# Patient Record
Sex: MALE | Race: WHITE | URBAN - METROPOLITAN AREA
[De-identification: names, ages, dates, MRNs, and addresses within clinical notes are randomized per-mention and may not be internally consistent; named-entity substitution may affect disease eponyms.]

---

## 2024-07-18 ENCOUNTER — PATIENT MESSAGE (OUTPATIENT)
Dept: SURGERY | Facility: CLINIC | Age: 69
End: 2024-07-18

## 2024-07-18 ENCOUNTER — OFFICE VISIT (OUTPATIENT)
Dept: SURGERY | Facility: CLINIC | Age: 69
End: 2024-07-18
Payer: COMMERCIAL

## 2024-07-18 DIAGNOSIS — R33.9 INCOMPLETE EMPTYING OF BLADDER: ICD-10-CM

## 2024-07-18 DIAGNOSIS — R31.29 MICROHEMATURIA: ICD-10-CM

## 2024-07-18 DIAGNOSIS — N41.9 PROSTATITIS, UNSPECIFIED PROSTATITIS TYPE: Primary | ICD-10-CM

## 2024-07-18 DIAGNOSIS — R82.90 URINE FINDING: ICD-10-CM

## 2024-07-18 LAB
APPEARANCE: CLEAR
BILIRUBIN: NEGATIVE
GLUCOSE (URINE DIPSTICK): NEGATIVE MG/DL
KETONES (URINE DIPSTICK): NEGATIVE MG/DL
MULTISTIX LOT#: ABNORMAL NUMERIC
NITRITE, URINE: NEGATIVE
PH, URINE: 6 (ref 4.5–8)
PROTEIN (URINE DIPSTICK): NEGATIVE MG/DL
SPECIFIC GRAVITY: 1.02 (ref 1–1.03)
URINE-COLOR: YELLOW
UROBILINOGEN,SEMI-QN: 0.2 MG/DL (ref 0–1.9)

## 2024-07-18 PROCEDURE — 81003 URINALYSIS AUTO W/O SCOPE: CPT | Performed by: PHYSICIAN ASSISTANT

## 2024-07-18 PROCEDURE — 51798 US URINE CAPACITY MEASURE: CPT | Performed by: PHYSICIAN ASSISTANT

## 2024-07-18 PROCEDURE — 99204 OFFICE O/P NEW MOD 45 MIN: CPT | Performed by: PHYSICIAN ASSISTANT

## 2024-07-18 RX ORDER — TAMSULOSIN HYDROCHLORIDE 0.4 MG/1
0.4 CAPSULE ORAL DAILY
Qty: 90 CAPSULE | Refills: 3 | Status: SHIPPED | OUTPATIENT
Start: 2024-07-18

## 2024-07-18 RX ORDER — LEVOFLOXACIN 500 MG/1
500 TABLET, FILM COATED ORAL DAILY
Qty: 28 TABLET | Refills: 0 | Status: SHIPPED | OUTPATIENT
Start: 2024-07-18 | End: 2024-08-15

## 2024-07-18 RX ORDER — SERTRALINE HYDROCHLORIDE 100 MG/1
100 TABLET, FILM COATED ORAL 2 TIMES DAILY
COMMUNITY
Start: 2024-03-05

## 2024-07-18 RX ORDER — TIRZEPATIDE 2.5 MG/.5ML
2.5 INJECTION, SOLUTION SUBCUTANEOUS WEEKLY
COMMUNITY
Start: 2024-07-17

## 2024-07-18 RX ORDER — ATORVASTATIN CALCIUM 40 MG/1
40 TABLET, FILM COATED ORAL NIGHTLY
COMMUNITY
Start: 2024-03-05

## 2024-07-18 RX ORDER — AMLODIPINE BESYLATE 5 MG/1
1 TABLET ORAL DAILY
COMMUNITY
Start: 2019-07-17

## 2024-07-18 RX ORDER — ZOLPIDEM TARTRATE 10 MG/1
1 TABLET ORAL NIGHTLY PRN
COMMUNITY
Start: 2024-07-11

## 2024-07-18 RX ORDER — ERGOCALCIFEROL (VITAMIN D2) 10 MCG
TABLET ORAL AS DIRECTED
COMMUNITY

## 2024-07-18 RX ORDER — OMEPRAZOLE 20 MG/1
20 CAPSULE, DELAYED RELEASE ORAL
COMMUNITY
Start: 2024-06-12

## 2024-07-18 RX ORDER — LISINOPRIL 2.5 MG/1
2.5 TABLET ORAL DAILY
COMMUNITY
Start: 2024-07-17

## 2024-07-18 NOTE — PROGRESS NOTES
[facility], [facility]    Urology Consult Note    History of Present Illness:   Patient is a 68 year old male with hx of type II DM (A1c 6.5%), HTN, insomnia, EVANS, anxiety, CAD s/p PCI x 2 8/2019, who presents today for consultation from Dr. Haddad's office.    Patient has history of prostatitis over the last 15 years, treated with antibiotic which historically has cleared infection. Last episode October 2023 and was started on antibiotic but was not as quick to resolve as prior episodes.     Currently with intermittent worsening of urinary frequency, urgency, hesitancy, and sensation of incomplete bladder emptying, symptoms present for ~3 months.   Daytime frequency q 1 hour (baseline typically q 2-3 hours)  Nocturia x 4 (baseline typically x 2)  No gross hematuria  Pain into the tip of the penis and low back pain    Loose bowel movements  Fluids: diet coke >32 oz, no water.     UA today trace blood, PVR 145mL    Microscopic urinalysis 7/12/24 RBC 3-10 RBC/hpf.  Urine culture 7/12/24 negative    PSA 7/12/14 1.08 ng/mL  PSA 7/13/23 1.27 ng/mL     records from [place]:  Treated with Bactrim and Levaquin historically for prostatitis - at most was treated with 6 week course.   Cystoscopy 5/5/23 - normal per patient ,unable to see full record   No upper tract imaging    HISTORY:  No past medical history on file.   No past surgical history on file.   No family history on file.   Social History:   Social History     Socioeconomic History    Marital status:         Allergies  Not on File    Review of Systems:   A 10-point review of systems was completed and is negative other than as noted above.    Physical Exam:   There were no vitals taken for this visit.    GENERAL APPEARANCE: well developed, well nourished, in no acute distress  NEUROLOGIC: no localizing neurologic signs, alert and oriented x 3, converses appropriately  HEAD: atraumatic, normocephalic  EYES: sclera  non-icteric  ORAL CAVITY: mucosa moist  NECK/THYROID: no obvious masses or goiter  LUNGS: non-labored breathing   exam deferred today  EXTREMITIES: warm, well-perfused. No clubbing, cyanosis or edema.  SKIN: no obvious rashes    Results:     Laboratory Data:  No results found for: \"WBC\", \"HGB\", \"PLT\"  No results found for: \"NA\", \"K\", \"CL\", \"CO2\", \"BUN\", \"CREATININE\", \"GLU\", \"GFRAA\", \"AST\", \"ALT\", \"TP\", \"ALB\", \"PHOS\", \"CA\", \"MG\"    Urinalysis Results (last three years):  No results for input(s)  in the last 3 years    Urine Culture Results (last three years):  No results found for: \"URINECUL\"    Imaging  No results found.      Impression:     Patient is a 68 year old male with hx of type II DM, HTN, insomnia, anxiety, CAD s/p PCI, who presents today for evaluation of chronic prostatitis.     We discussed that he likely has a component of prostatitis. We reviewed prostatitis treatment and prevention strategies and I provided and reviewed educational materials for this. Recommend he drink plenty of water (enough to keep urine clear) and try hot baths and avoid activities which may exacerbate pain such as sitting for prolonged periods of time, riding a bike/lawnmower/tractor, heavy lifting. Provided he has no medical contraindications to NSAIDs I also suggested he try this. Discussed a 4 week course of abx for prostatitis and he would like to do this.    We also reviewed his recent urinalysis with microscopic hematuria. Recent cystoscopy (May 2023) negative per patient but no upper tract imaging. Recommend CTU to complete evaluation.     Recommendations:  As above. [facility]. Tamsulosin, Levaquin scripts sent. CTU.   Follow up in 4 weeks, repeat PVR at that time.     Thank you very much for this consult. Please call if there are any questions or concerns.     Brenda Bains PA-C  Urology  [facility]    Date: 7/18/2024

## 2024-07-18 NOTE — PATIENT INSTRUCTIONS
Prostatitis   WHAT YOU SHOULD KNOW:   The prostate gland is a male sex gland. Fluid made by the prostate mixes with sperm (from the testicles) to make semen. Prostatitis ([id number]-[id number]-[id number]-tis) is an infection or inflammation of the prostate gland. Men of any age can have prostatitis, and they may get it more than once. Prostatitis may be caused by certain diseases, infections, procedures, or a large prostate gland. Prostatitis is not contagious (not able to be spread) to a sexual partner. Symptoms of prostatitis may include pain, problems urinating, blood in your urine, and fever. Medicine and certain therapies can be used to treat prostatitis. The prostate gland is the size and shape of a walnut. It is found in front of the rectum (area of the intestines that holds your bowel movements). The prostate wraps around the urethra and the neck of the bladder.         Chronic prostatitis is more common in older men. It is usually an inflammatory condition and not an infection. But, bacterial infection can also cause chronic prostatitis. It can cause pain in the rectum, urethra, bladder, or scrotum. It can also make you unable to fully empty the bladder.  You may urinate often, or have burning with urination. Prostatitis may also cause painful ejaculation, erectile dysfunction, or prolonged erections.  Things that may trigger prostatitis include:  Sitting for long periods of time (especially on a hard surface or sitting on things that shake)  Squats/deadlifts  Sudden onset (acute) prostatitis usually occurs in men younger than 35. It is from a bacterial infection. You may have severe symptoms such as fever, chills, muscle aches, and pain in the area between the scrotum and anus (perineum). You may have a hard time urinating, or have pain or burning when urinating. There may be blood or pus in the urine.    Prostatitis can sometimes take 4-6 weeks to resolve    Home care  These guidelines will help you care for yourself at  home:  Rest at home until the fever is gone and you are feeling better.  If your healthcare gives you an antibiotic, take it exactly as you are told. Take it until it is all gone.  Avoid sitting at a 90 degree angle for long periods of time. Standing or reclining is preferable. Avoid sitting on anything that shakes (tractors, lawn-mowers, long car rides) if possible. You may use a donut while sitting to avoid excessive pressure on the perineum/prostate while sitting.  Drink plenty of fluids/water.   Avoid/limit dietary irritants such as coffee, tea, carbonated beverages, sugary things, citrus fruits/juice, spicy foods.   To relieve pain, put ice packs on the inflamed area. You can make your own ice pack by putting ice cubes in a sealed plastic bag wrapped in a thin towel.  Soaking once or twice daily in a hot bath or using a heating pad may help alleviate discomfort in men suffering from chronic prostatitis. Would avoid excessive heat in the setting of acute prostatitis.  You may use over-the-counter medicines to control pain, unless another medicine was given. Ibuprofen is typically a very effective anti-inflammatory pain medication. You may take 400 mg twice daily for 2 weeks regardless of symptoms and as needed for discomfort thereafter. If you have chronic liver or kidney disease, talk with your healthcare provider before taking these medicines. Also talk with your provider if you've ever had a stomach ulcer or GI bleeding.  Urinate often, do not hold your bladder.  If you have symptoms such as weak stream your doctor may prescribe an alpha-blocker such as tamsulosin (flomax).  Make sure chronic pain issues are under good control, especially back pain issues as this is a significant risk factor for prostatitis/pelvic pain. Talk to your primary care or back specialist if your pain is not under adequate control.  Constipation causes straining and pain. Avoid constipation by eating natural laxatives such as prunes,  fresh fruits, and whole-grain cereals. If needed, use a mild over-the-counter (OTC) laxative for constipation. An OTC stool softener such as colace may be used to keep the stools soft.  If sex is uncomfortable or painful, avoid until symptoms get better. There is some evidence, however, that regular ejaculation may actually help improve prostatitis symptoms. You may have sex if you feel well.     Call 911 or go to ER  Call 911 or go to ER if any of these occur:  Weakness, dizziness, or fainting    When to seek medical advice  Call your healthcare provider right away if any of these occur:  Fever of 100.4°F (38°C) or higher, or as directed by your healthcare provider  Unable to pass urine for 8 hours  Pressure or pain in your bladder gets worse

## 2024-08-13 ENCOUNTER — HOSPITAL ENCOUNTER (OUTPATIENT)
Dept: CT IMAGING | Age: 69
Discharge: HOME | End: 2024-08-13
Attending: PHYSICIAN ASSISTANT
Payer: COMMERCIAL

## 2024-08-13 DIAGNOSIS — R31.29 MICROHEMATURIA: ICD-10-CM

## 2024-08-13 PROCEDURE — 76377 3D RENDER W/INTRP POSTPROCES: CPT | Performed by: PHYSICIAN ASSISTANT

## 2024-08-13 PROCEDURE — 74178 CT ABD&PLV WO CNTR FLWD CNTR: CPT | Performed by: PHYSICIAN ASSISTANT

## 2024-08-14 ENCOUNTER — TELEPHONE (OUTPATIENT)
Dept: SURGERY | Facility: CLINIC | Age: 69
End: 2024-08-14

## 2024-08-14 NOTE — TELEPHONE ENCOUNTER
Patient states Brenda WheelerBerta called on 8/13 and recommended he get cystoscopy. Patient is asking if he can get this done on 8/16 instead of having the follow up appointment. Please call.

## 2024-08-15 NOTE — PROGRESS NOTES
[facility], [facility]    Urology Progress Note    History of Present Illness:   Patient is a 68 year old male who presents today for . The patient last saw KATARINA Ortega on 07/18/2024 for recurrent prostatitis.     Patient has history of prostatitis over the last 15 years, treated with antibiotic which historically has cleared infection. Last episode October 2023 and was started on antibiotic but was not as quick to resolve as prior episodes.     At 07/18 visit, presented with with intermittent worsening of urinary frequency, urgency, hesitancy, and sensation of incomplete bladder emptying, symptoms present for ~3 months. Started on 4 week course of Levaquin at the time, as well as Tamsulosin 0.4 mg every day.    Patient states symptoms are still present. Reports that he has been taking Ibuprofen 400 mg BID for pain and taking daily warm baths.    Fluids: diet coke >32 oz, no water.     AUA score 29 (5/5/0/5/5/5/4) with a QoL score of 5.  Urine dip today shows small blood and trace leukocytes,  mL    Microscopic urinalysis 7/12/24 RBC 3-10 RBC/hpf.  Urine culture 7/12/24 negative    8/13/24 CT showed shows a focal bladder wall thickening involving the right antral lateral aspect with infiltration of the adjacent fat, underlying mass cannot be excluded      PSA 7/12/14 1.08 ng/mL  PSA 7/13/23 1.27 ng/mL     records from [place]:  Treated with Bactrim and Levaquin historically for prostatitis - at most was treated with 6 week course.   Cystoscopy 5/5/23 - normal per patient ,unable to see full record   No upper tract imaging    HISTORY:  No past medical history on file.   No past surgical history on file.   No family history on file.   Social History:   Social History     Socioeconomic History    Marital status:         Allergies  Not on File    Review of Systems:   A 10-point review of systems was completed and is negative other than as noted above.    Physical  Exam:   There were no vitals taken for this visit.    GENERAL APPEARANCE: well developed, well nourished, in no acute distress  NEUROLOGIC: no localizing neurologic signs, alert and oriented x 3, converses appropriately  HEAD: atraumatic, normocephalic  EYES: sclera non-icteric  ORAL CAVITY: mucosa moist  NECK/THYROID: no obvious masses or goiter  LUNGS: non-labored breathing   exam deferred today  EXTREMITIES: warm, well-perfused. No clubbing, cyanosis or edema.  SKIN: no obvious rashes    Results:     Laboratory Data:  No results found for: \"WBC\", \"HGB\", \"PLT\"  No results found for: \"NA\", \"K\", \"CL\", \"CO2\", \"BUN\", \"CREATININE\", \"GLU\", \"GFRAA\", \"AST\", \"ALT\", \"TP\", \"ALB\", \"PHOS\", \"CA\", \"MG\"    Urinalysis Results (last three years):  Recent Labs     07/18/24  0949 08/16/24  1020   SPECGRAVITY 1.020 1.025   PHURINE 6.0 6.0   NITRITE Negative Negative       Urine Culture Results (last three years):  No results found for: \"URINECUL\"    Imaging  CT Urogram 08/13/2024 shows a focal bladder wall thickening involving the right antral lateral aspect with infiltration of the adjacent fat, underlying mass cannot be excluded.  Recommend direct visualization for further evaluation.       Impression:     Microscopic Hematuria  Prostatitis  Focal Bladder Wall Thickening    Recommendations:  - UA and urine culture  - Increase Tamsulosin to 0.8 mg every day  - Schedule office cystoscopy for microhematuria and focal bladder wall thickening on CT  - Valium 10 mg ordered. Take 1-2 20-30 minutes prior to cystoscopy if you have a ride home     SPRING Byrd  [facility]    Date: 08/16/2024

## 2024-08-15 NOTE — TELEPHONE ENCOUNTER
Returned patient call.  Patient plans to keep tomorrow's scheduled appointment with SPRING Byrd  Patient requesting to schedule cystoscopy sooner rather than later and will discuss at tomorrow's appointment.

## 2024-08-16 ENCOUNTER — OFFICE VISIT (OUTPATIENT)
Dept: SURGERY | Facility: CLINIC | Age: 69
End: 2024-08-16
Payer: COMMERCIAL

## 2024-08-16 DIAGNOSIS — R31.29 OTHER MICROSCOPIC HEMATURIA: ICD-10-CM

## 2024-08-16 DIAGNOSIS — R82.90 URINE FINDING: ICD-10-CM

## 2024-08-16 DIAGNOSIS — N32.89 BLADDER WALL THICKENING: Primary | ICD-10-CM

## 2024-08-16 DIAGNOSIS — N41.9 PROSTATITIS, UNSPECIFIED PROSTATITIS TYPE: ICD-10-CM

## 2024-08-16 LAB
APPEARANCE: CLEAR
BILIRUBIN: NEGATIVE
GLUCOSE (URINE DIPSTICK): NEGATIVE MG/DL
KETONES (URINE DIPSTICK): NEGATIVE MG/DL
MULTISTIX LOT#: ABNORMAL NUMERIC
NITRITE, URINE: NEGATIVE
PH, URINE: 6 (ref 4.5–8)
SPECIFIC GRAVITY: 1.02 (ref 1–1.03)
URINE-COLOR: YELLOW
UROBILINOGEN,SEMI-QN: 0.2 MG/DL (ref 0–1.9)

## 2024-08-16 RX ORDER — TAMSULOSIN HYDROCHLORIDE 0.4 MG/1
0.8 CAPSULE ORAL DAILY
Qty: 180 CAPSULE | Refills: 3 | Status: SHIPPED | OUTPATIENT
Start: 2024-08-16 | End: 2025-08-11

## 2024-08-16 RX ORDER — DIAZEPAM 10 MG/1
10 TABLET ORAL SEE ADMIN INSTRUCTIONS
Qty: 2 TABLET | Refills: 0 | Status: SHIPPED | OUTPATIENT
Start: 2024-08-16

## 2024-08-16 NOTE — PATIENT INSTRUCTIONS
I've ordered Valium for you to take prior to your cystoscopy. Please pick it up from the pharmacy today or tomorrow as the order expires quickly and hold on to it until the day of your procedure.    Take 1-2 tablets 20-30 minutes before your procedure. Make sure you have a ride home that day!  Do not take Ibuprofen or aspirin for 5 days before your cystoscopy.

## 2024-08-26 ENCOUNTER — TELEPHONE (OUTPATIENT)
Dept: SURGERY | Facility: CLINIC | Age: 69
End: 2024-08-26

## 2024-08-26 NOTE — TELEPHONE ENCOUNTER
Pt called.  Pt finished the antibiotic prior to the appointment on 8-16-24.  Pt is doing worse.  Should pt go back on an antibiotic.  [facility]  Pt is scheduled for cysto 9-4-24.  Please call pt

## 2024-08-26 NOTE — PROGRESS NOTES
HPI:     Delfin De Leon is a 68 year old male with a PMH of anxiety, HTN, HL, DM, GERD.    New to me, saw Brenda Ricketts in the past.    Following for:  1. BPH with severe LUTS  - flomax 7/18/24  2. Chronic prostatitis since ~ 2010  - typically clears with abx  3. AMH  - cysto + bladder bx 8/10/23 ([facility]) showing acute on chronic cystitis    PCP - Boo Lagos)  Prior Urologist - Liliam 8/16/24, Brenda 7/18/24, [facility]    Presents to establish care with me, cysto, symptom check, review CTU, discuss next steps.    He feels well.  He is taking 0.8 mg flomax, completed 4 w levaquin. Urinary symptoms are stable and still bothersome.  Most bothered by frequency, weak stream    Hip/back pain: L hip pain  Diarrhea/constipation: none  Sits ~ 8-10 h per day.  Exercise: none  Snoring: yes - has CPAP but not using and hasn't been evaluated in > 10 y. Would like to repeat.    AUA SS is 25/35 with 5 n, s, w, f, MARCIANO - was 29/35 prior to flomax. Feels terrible.  Incontinence: none    UA is negative and PVR is 69 mL - was 101 mL    UTI hx: none  Gross hematuria: none  Tobacco hx: none  Kidney stone hx: none  Fam h/o  malignancy: none    Poor potency and prefers observation    PSA 1.08 7/12/24    Drinks no water, > 32 soda with medium yellow urine.    CTU 8/13/24: focal BWT (right lateral)  Cysto today: mod BPH with mod ADONIS. ~ 2 cm papillary erythematous lesion at the bladder dome    We reviewed OAB tx and prevention strategies at today's visit and I provided educational materials for this. I encouraged the patient to drink at least 40-60 oz water per day or enough to keep urine clear. I also reviewed dietary irritants and provided educational materals for this.     We discussed adding proscar as options for LUTS and reviewed SEs. He would like to try this.    We discussed surgical options for severe BPH/LUTS and he is interested in TURP with resection of bladder tumor. We discussed the risks and benefits to  the procedure including, but not limited to, bleeding, infection, possible damage to surrounding structures. The patient understands and would like to proceed.    We also discussed PFT as an option for pelvic floor issues and he wants to try this.    He will increase water intake and cut back on dietary irritants to help with OAB symptoms. Continue flomax and starting proscar for BPH/LUTS. PFT ordered for pelvic pain. Repeat sleep study ordered. OR for TURP with resection of bladder mass.  I wrote down and reviewed instructions with him.    I spent over 40 minutes in consultation and coordination of this patient's care today including review of pertinent labs, imaging, records with > 50% of time spent counseling - in addition to time spent performing cystoscopy.    PROCEDURE NOTE    PROCEDURE PERFORMED: Flexible Cystoscopy    After informed consent and urinalysis was obtained, he was placed in the supine position and prepped and draped in the usual sterile fashion using Betadine. Local anesthesia was induced by the introduction of 2% Lidocaine jelly per urethra.  A 16 Luxembourgish flexible scope was passed through the anterior urethra, the posterior urethra and prostate were negotiated and the bladder was entered. The entirety of the bladder was examined and the scope was retroflexed to examine the bladder neck.     Findings: as noted above    The patient tolerated the procedure well, suffered no complications, was able to void spontaneously after completion of the procedure in the office, and left the office in good condition.    Nadia-procedural antibiotics were given.  _________________________________        HISTORY:  No past medical history on file.   No past surgical history on file.   No family history on file.   Social History:   Social History     Socioeconomic History    Marital status:         Medications (Active prior to today's visit):  Current Outpatient Medications   Medication Sig Dispense Refill     finasteride 5 MG Oral Tab Take 1 tablet (5 mg total) by mouth daily. 90 tablet 6    tamsulosin 0.4 MG Oral Cap Take 2 capsules (0.8 mg total) by mouth daily. Take 1/2 hour following the same meal each day 180 capsule 3    diazePAM (VALIUM) 10 MG Oral Tab Take 1 tablet (10 mg total) by mouth See Admin Instructions. Take 1-2 tablets by mouth 20-30 minutes before procedure. 2 tablet 0    amLODIPine 5 MG Oral Tab Take 1 tablet (5 mg total) by mouth daily.      ascorbic acid 1000 MG Oral Tab Take 1 tablet (1,000 mg total) by mouth daily.      atorvastatin 40 MG Oral Tab Take 1 tablet (40 mg total) by mouth nightly.      Cholecalciferol 50 MCG (2000 UT) Oral Tab Take 1 tablet (2,000 Units total) by mouth daily.      metFORMIN 500 MG Oral Tab Take 1 tablet (500 mg total) by mouth in the morning and 1 tablet (500 mg total) before bedtime.      Multiple Vitamin (DAILY VALUE MULTIVITAMIN) Oral Tab Take by mouth As Directed.      omeprazole 20 MG Oral Capsule Delayed Release Take 1 capsule (20 mg total) by mouth every morning before breakfast.      pyridoxine 100 MG Oral Tab Take 1 tablet (100 mg total) by mouth daily.      sertraline 100 MG Oral Tab Take 1 tablet (100 mg total) by mouth 2 (two) times daily.      Tirzepatide (MOUNJARO) 2.5 MG/0.5ML Subcutaneous Solution Pen-injector Inject 2.5 mg into the skin once a week.      zolpidem 10 MG Oral Tab Take 1 tablet (10 mg total) by mouth nightly as needed.      lisinopril 2.5 MG Oral Tab Take 1 tablet (2.5 mg total) by mouth daily. (Patient not taking: Reported on 8/16/2024)         Allergies:  Not on File      ROS:     A comprehensive 10 point review of systems was completed.  Pertinent positives and negatives noted in the the HPI.    PHYSICAL EXAM:     GENERAL APPEARANCE: well, developed, well nourished, in no acute distress  NEUROLOGIC: nonfocal, alert and oriented  HEAD: normocephalic, atraumatic  EYES: sclera non-icteric  EARS: hearing intact  ORAL CAVITY: mucosa  moist  NECK/THYROID: no obvious goiter or masses  LUNGS: nonlabored breathing  ABDOMEN: soft, no obvious masses or tenderness  SKIN: no obvious rashes    : as noted above    ASSESSMENT/PLAN:   Diagnoses and all orders for this visit:    Urine finding  -     POC Urinalysis, Automated Dip without microscopy (PCA and EMMG ONLY) [90813]  -     sulfamethoxazole-trimethoprim DS (Bactrim DS) 800-160 MG per tab 1 tablet    BPH with obstruction/lower urinary tract symptoms  -     finasteride 5 MG Oral Tab; Take 1 tablet (5 mg total) by mouth daily.    Urinary urgency    Asymptomatic microscopic hematuria  -     CYSTOURETHROSCOPY    Bladder mass    Pelvic floor dysfunction  -     PELVIC FLOOR THERAPY - INTERNAL  -     SPECIALTY (OTHER) - EXTERNAL    Loud snoring  -     Home Sleep Apnea Test (Adult pt only) - Sleep consult required for Medicare pts  -     General sleep study; Future    - as noted above.    Thanks again for this consult.    Sam Davis MD, FACS  Urologist  [facility]  Office: [phone]

## 2024-08-26 NOTE — TELEPHONE ENCOUNTER
I s/w pt and determined that he is still having pain at the tip of the penis which is constant lately and he thinks he may need to have some more abx. Pt also c/o frequency and urgency to urinate but then when trying to urinate he has difficulty starting the stream and never feels that he is emptying. Denies dysuria, fever and chills,  or cloudy foul smelling urine. I asked pt when he was supposed to submit the urine for the orders in place and pt stated that he did urine tests already and I explained that I see the orders that are for future testing and were not processed on the day he was here seeing SL. I told pt that he can use these orders today and go to the lab to submit a sample. I asked pt if he feels his pain is severe enough to warrant abx or can he wait for the results which take 48 hrs. And he stated he will just wait for the results. I asked that he call the office if his symptoms worsen before results are available. Pt verbalized understanding and compliance.            Impression:    Microscopic Hematuria  Prostatitis  Focal Bladder Wall Thickening     Recommendations:  - UA and urine culture  - Increase Tamsulosin to 0.8 mg every day  - Schedule office cystoscopy for microhematuria and focal bladder wall thickening on CT  - Valium 10 mg ordered. Take 1-2 20-30 minutes prior to cystoscopy if you have a ride home     SPRING Byrd  [facility]     Date: 08/16/2024

## 2024-08-27 ENCOUNTER — LAB ENCOUNTER (OUTPATIENT)
Dept: LAB | Age: 69
End: 2024-08-27
Payer: COMMERCIAL

## 2024-08-27 DIAGNOSIS — N41.9 PROSTATITIS, UNSPECIFIED PROSTATITIS TYPE: ICD-10-CM

## 2024-08-27 DIAGNOSIS — R82.90 URINE FINDING: ICD-10-CM

## 2024-08-27 LAB
BILIRUB UR QL STRIP.AUTO: NEGATIVE
CLARITY UR REFRACT.AUTO: CLEAR
GLUCOSE UR STRIP.AUTO-MCNC: NORMAL MG/DL
KETONES UR STRIP.AUTO-MCNC: NEGATIVE MG/DL
LEUKOCYTE ESTERASE UR QL STRIP.AUTO: 250
NITRITE UR QL STRIP.AUTO: NEGATIVE
PH UR STRIP.AUTO: 5.5 [PH] (ref 5–8)
RBC UR QL AUTO: NEGATIVE
SP GR UR STRIP.AUTO: 1.02 (ref 1–1.03)
UROBILINOGEN UR STRIP.AUTO-MCNC: NORMAL MG/DL
WBC #/AREA URNS AUTO: >50 /HPF

## 2024-08-27 PROCEDURE — 81001 URINALYSIS AUTO W/SCOPE: CPT

## 2024-08-27 PROCEDURE — 87086 URINE CULTURE/COLONY COUNT: CPT

## 2024-09-04 ENCOUNTER — TELEPHONE (OUTPATIENT)
Dept: SURGERY | Facility: CLINIC | Age: 69
End: 2024-09-04

## 2024-09-04 ENCOUNTER — PROCEDURE (OUTPATIENT)
Dept: SURGERY | Facility: CLINIC | Age: 69
End: 2024-09-04
Payer: COMMERCIAL

## 2024-09-04 VITALS — DIASTOLIC BLOOD PRESSURE: 69 MMHG | SYSTOLIC BLOOD PRESSURE: 108 MMHG

## 2024-09-04 DIAGNOSIS — R39.15 URINARY URGENCY: ICD-10-CM

## 2024-09-04 DIAGNOSIS — N13.8 BPH WITH OBSTRUCTION/LOWER URINARY TRACT SYMPTOMS: ICD-10-CM

## 2024-09-04 DIAGNOSIS — N40.1 BPH WITH OBSTRUCTION/LOWER URINARY TRACT SYMPTOMS: ICD-10-CM

## 2024-09-04 DIAGNOSIS — N32.89 BLADDER MASS: ICD-10-CM

## 2024-09-04 DIAGNOSIS — R31.21 ASYMPTOMATIC MICROSCOPIC HEMATURIA: ICD-10-CM

## 2024-09-04 DIAGNOSIS — N40.1 BENIGN PROSTATIC HYPERPLASIA WITH LOWER URINARY TRACT SYMPTOMS, SYMPTOM DETAILS UNSPECIFIED: Primary | ICD-10-CM

## 2024-09-04 DIAGNOSIS — M62.89 PELVIC FLOOR DYSFUNCTION: ICD-10-CM

## 2024-09-04 DIAGNOSIS — R06.83 LOUD SNORING: ICD-10-CM

## 2024-09-04 DIAGNOSIS — R82.90 URINE FINDING: Primary | ICD-10-CM

## 2024-09-04 PROCEDURE — 51798 US URINE CAPACITY MEASURE: CPT | Performed by: UROLOGY

## 2024-09-04 PROCEDURE — 52000 CYSTOURETHROSCOPY: CPT | Performed by: UROLOGY

## 2024-09-04 PROCEDURE — 99215 OFFICE O/P EST HI 40 MIN: CPT | Performed by: UROLOGY

## 2024-09-04 RX ORDER — FINASTERIDE 5 MG/1
5 TABLET, FILM COATED ORAL DAILY
Qty: 90 TABLET | Refills: 6 | Status: SHIPPED | OUTPATIENT
Start: 2024-09-04

## 2024-09-04 RX ORDER — SULFAMETHOXAZOLE/TRIMETHOPRIM 800-160 MG
1 TABLET ORAL ONCE
Status: COMPLETED | OUTPATIENT
Start: 2024-09-04 | End: 2024-09-04

## 2024-09-04 RX ADMIN — SULFAMETHOXAZOLE/TRIMETHOPRIM 1 TABLET: 800-160 MG TABLET ORAL at 09:17:00

## 2024-09-04 NOTE — PATIENT INSTRUCTIONS
1. Increase water intake to 40-60 ounces (2 liters) per day or enough to keep urine clear to pale yellow.  2. Cut back on dietary irritants such as coffee, tea, soda, juice.  3. Continue flomax, starting proscar  4. Start pelvic floor physical therapy  5. Recommend repeat sleep study  6. Plan for TURP

## 2024-09-04 NOTE — TELEPHONE ENCOUNTER
Please schedule this patient for surgery.    AMOS Acevedo    Urology Surgery Request  Surgeon: Ryan  Location (if known): [facility]  Procedure: cysto, TURP with TURBT   Anesthesia: General   Time Frame: pt preference  Time required: 90 minutes  Diagnosis: BPH with LUTS, bladder mass    Antibiotics: per hospital protocol unless checked below   _x_ Levaquin 500 mg IV   ___ Gemcitabine 2 g/100 mL NS bladder instillation to be given in OR    Estimated Post Op/Follow Up Appt: ~ 2-4 weeks with PA for post-op with PVR and ~ 4 mo with me for post-op with PVR. Please schedule appointment at time of surgery scheduling.

## 2024-09-05 NOTE — TELEPHONE ENCOUNTER
Patient returned call and LVM to call back.  Called the patient and LVM to contact me at [phone] or he can contact Rochelle at [phone] to schedule surgery.

## 2024-09-05 NOTE — TELEPHONE ENCOUNTER
Spoke with the patient.  Scheduling the surgery for  10/31/24.   Reviewed the pre-op instructions and will send via My Chart once confirmed.  Patient can contact me at [phone]

## 2024-10-10 RX ORDER — IBUPROFEN 400 MG/1
400 TABLET, FILM COATED ORAL EVERY 6 HOURS PRN
COMMUNITY

## 2024-10-21 ENCOUNTER — TELEPHONE (OUTPATIENT)
Dept: SURGERY | Facility: CLINIC | Age: 69
End: 2024-10-21

## 2024-10-21 DIAGNOSIS — N40.1 BENIGN PROSTATIC HYPERPLASIA WITH LOWER URINARY TRACT SYMPTOMS, SYMPTOM DETAILS UNSPECIFIED: Primary | ICD-10-CM

## 2024-10-21 DIAGNOSIS — N32.89 BLADDER MASS: ICD-10-CM

## 2024-10-21 NOTE — TELEPHONE ENCOUNTER
Contacted the patient, rescheduling the patient's surgery to 12/19/24.  Rescheduled the post op appts and will resend the pre-op instructions.  Patient can contact me at [phone]

## 2024-11-26 ENCOUNTER — TELEPHONE (OUTPATIENT)
Dept: SURGERY | Facility: CLINIC | Age: 69
End: 2024-11-26

## 2024-11-26 RX ORDER — SODIUM BICARBONATE 650 MG/1
1000 TABLET ORAL DAILY
COMMUNITY
End: 2024-12-20

## 2024-11-26 NOTE — TELEPHONE ENCOUNTER
Patient has questions regarding the recovery from the surgery on 12/19/24.  Please contact to discuss.

## 2024-11-26 NOTE — TELEPHONE ENCOUNTER
Called pt and discussed what to expect post op.  All questions answered.  This encounter is completed.

## 2024-12-06 ENCOUNTER — LABORATORY ENCOUNTER (OUTPATIENT)
Dept: LAB | Age: 69
End: 2024-12-06
Attending: UROLOGY
Payer: COMMERCIAL

## 2024-12-06 ENCOUNTER — EKG ENCOUNTER (OUTPATIENT)
Dept: LAB | Age: 69
End: 2024-12-06
Attending: UROLOGY
Payer: COMMERCIAL

## 2024-12-06 DIAGNOSIS — Z01.818 PRE-OP TESTING: ICD-10-CM

## 2024-12-06 LAB
ANION GAP SERPL CALC-SCNC: 7 MMOL/L (ref 0–18)
BUN BLD-MCNC: 12 MG/DL (ref 9–23)
CALCIUM BLD-MCNC: 9.5 MG/DL (ref 8.7–10.4)
CHLORIDE SERPL-SCNC: 104 MMOL/L (ref 98–112)
CO2 SERPL-SCNC: 27 MMOL/L (ref 21–32)
CREAT BLD-MCNC: 1.14 MG/DL
EGFRCR SERPLBLD CKD-EPI 2021: 70 ML/MIN/1.73M2 (ref 60–?)
FASTING STATUS PATIENT QL REPORTED: YES
GLUCOSE BLD-MCNC: 197 MG/DL (ref 70–99)
OSMOLALITY SERPL CALC.SUM OF ELEC: 291 MOSM/KG (ref 275–295)
POTASSIUM SERPL-SCNC: 4.5 MMOL/L (ref 3.5–5.1)
SODIUM SERPL-SCNC: 138 MMOL/L (ref 136–145)

## 2024-12-06 PROCEDURE — 93010 ELECTROCARDIOGRAM REPORT: CPT | Performed by: INTERNAL MEDICINE

## 2024-12-06 PROCEDURE — 93005 ELECTROCARDIOGRAM TRACING: CPT

## 2024-12-06 PROCEDURE — 80048 BASIC METABOLIC PNL TOTAL CA: CPT

## 2024-12-06 PROCEDURE — 36415 COLL VENOUS BLD VENIPUNCTURE: CPT

## 2024-12-07 LAB
ATRIAL RATE: 70 BPM
P AXIS: 65 DEGREES
P-R INTERVAL: 210 MS
Q-T INTERVAL: 398 MS
QRS DURATION: 82 MS
QTC CALCULATION (BEZET): 429 MS
R AXIS: 62 DEGREES
T AXIS: 44 DEGREES
VENTRICULAR RATE: 70 BPM

## 2024-12-19 ENCOUNTER — HOSPITAL ENCOUNTER (OUTPATIENT)
Facility: HOSPITAL | Age: 69
Discharge: HOME | End: 2024-12-20
Attending: UROLOGY | Admitting: UROLOGY
Payer: COMMERCIAL

## 2024-12-19 ENCOUNTER — ANESTHESIA (OUTPATIENT)
Dept: SURGERY | Facility: HOSPITAL | Age: 69
End: 2024-12-19
Payer: COMMERCIAL

## 2024-12-19 ENCOUNTER — ANESTHESIA EVENT (OUTPATIENT)
Dept: SURGERY | Facility: HOSPITAL | Age: 69
End: 2024-12-19
Payer: COMMERCIAL

## 2024-12-19 DIAGNOSIS — N40.1 BENIGN PROSTATIC HYPERPLASIA WITH LOWER URINARY TRACT SYMPTOMS, SYMPTOM DETAILS UNSPECIFIED: ICD-10-CM

## 2024-12-19 DIAGNOSIS — Z01.818 PRE-OP TESTING: Primary | ICD-10-CM

## 2024-12-19 DIAGNOSIS — N32.89 BLADDER MASS: ICD-10-CM

## 2024-12-19 LAB
EST. AVERAGE GLUCOSE BLD GHB EST-MCNC: 148 MG/DL (ref 68–126)
GLUCOSE BLD-MCNC: 152 MG/DL (ref 70–99)
GLUCOSE BLD-MCNC: 155 MG/DL (ref 70–99)
GLUCOSE BLD-MCNC: 167 MG/DL (ref 70–99)
GLUCOSE BLD-MCNC: 208 MG/DL (ref 70–99)
HBA1C MFR BLD: 6.8 % (ref ?–5.7)

## 2024-12-19 PROCEDURE — 52601 PROSTATECTOMY (TURP): CPT | Performed by: UROLOGY

## 2024-12-19 PROCEDURE — 0TBB8ZX EXCISION OF BLADDER, VIA NATURAL OR ARTIFICIAL OPENING ENDOSCOPIC, DIAGNOSTIC: ICD-10-PCS | Performed by: UROLOGY

## 2024-12-19 PROCEDURE — 52204 CYSTOSCOPY W/BIOPSY(S): CPT | Performed by: UROLOGY

## 2024-12-19 PROCEDURE — 0VT08ZZ RESECTION OF PROSTATE, VIA NATURAL OR ARTIFICIAL OPENING ENDOSCOPIC: ICD-10-PCS | Performed by: UROLOGY

## 2024-12-19 RX ORDER — NALOXONE HYDROCHLORIDE 0.4 MG/ML
0.08 INJECTION, SOLUTION INTRAMUSCULAR; INTRAVENOUS; SUBCUTANEOUS AS NEEDED
Status: DISCONTINUED | OUTPATIENT
Start: 2024-12-19 | End: 2024-12-19 | Stop reason: HOSPADM

## 2024-12-19 RX ORDER — GLYCOPYRROLATE 0.2 MG/ML
INJECTION, SOLUTION INTRAMUSCULAR; INTRAVENOUS AS NEEDED
Status: DISCONTINUED | OUTPATIENT
Start: 2024-12-19 | End: 2024-12-19 | Stop reason: SURG

## 2024-12-19 RX ORDER — POLYETHYLENE GLYCOL 3350 17 G/17G
17 POWDER, FOR SOLUTION ORAL DAILY PRN
Status: DISCONTINUED | OUTPATIENT
Start: 2024-12-19 | End: 2024-12-20

## 2024-12-19 RX ORDER — NICOTINE POLACRILEX 4 MG
30 LOZENGE BUCCAL
Status: DISCONTINUED | OUTPATIENT
Start: 2024-12-19 | End: 2024-12-19 | Stop reason: HOSPADM

## 2024-12-19 RX ORDER — SODIUM CHLORIDE 9 MG/ML
INJECTION, SOLUTION INTRAVENOUS CONTINUOUS
Status: DISCONTINUED | OUTPATIENT
Start: 2024-12-19 | End: 2024-12-20

## 2024-12-19 RX ORDER — SENNOSIDES 8.6 MG
17.2 TABLET ORAL NIGHTLY PRN
Status: DISCONTINUED | OUTPATIENT
Start: 2024-12-19 | End: 2024-12-20

## 2024-12-19 RX ORDER — HYDROMORPHONE HYDROCHLORIDE 1 MG/ML
0.8 INJECTION, SOLUTION INTRAMUSCULAR; INTRAVENOUS; SUBCUTANEOUS EVERY 2 HOUR PRN
Status: DISCONTINUED | OUTPATIENT
Start: 2024-12-19 | End: 2024-12-20

## 2024-12-19 RX ORDER — NICOTINE POLACRILEX 4 MG
15 LOZENGE BUCCAL
Status: DISCONTINUED | OUTPATIENT
Start: 2024-12-19 | End: 2024-12-20

## 2024-12-19 RX ORDER — PROCHLORPERAZINE EDISYLATE 5 MG/ML
INJECTION INTRAMUSCULAR; INTRAVENOUS
Status: COMPLETED
Start: 2024-12-19 | End: 2024-12-19

## 2024-12-19 RX ORDER — PANTOPRAZOLE SODIUM 20 MG/1
20 TABLET, DELAYED RELEASE ORAL NIGHTLY
Status: DISCONTINUED | OUTPATIENT
Start: 2024-12-19 | End: 2024-12-20

## 2024-12-19 RX ORDER — HYDROCODONE BITARTRATE AND ACETAMINOPHEN 5; 325 MG/1; MG/1
1 TABLET ORAL EVERY 6 HOURS PRN
Qty: 30 TABLET | Refills: 0 | Status: SHIPPED | OUTPATIENT
Start: 2024-12-19

## 2024-12-19 RX ORDER — LIDOCAINE HYDROCHLORIDE 10 MG/ML
INJECTION, SOLUTION EPIDURAL; INFILTRATION; INTRACAUDAL; PERINEURAL AS NEEDED
Status: DISCONTINUED | OUTPATIENT
Start: 2024-12-19 | End: 2024-12-19 | Stop reason: SURG

## 2024-12-19 RX ORDER — NICOTINE POLACRILEX 4 MG
15 LOZENGE BUCCAL
Status: DISCONTINUED | OUTPATIENT
Start: 2024-12-19 | End: 2024-12-19 | Stop reason: HOSPADM

## 2024-12-19 RX ORDER — ACETAMINOPHEN 500 MG
1000 TABLET ORAL EVERY 8 HOURS SCHEDULED
Status: DISCONTINUED | OUTPATIENT
Start: 2024-12-19 | End: 2024-12-20

## 2024-12-19 RX ORDER — MAGNESIUM HYDROXIDE 1200 MG/15ML
3000 LIQUID ORAL CONTINUOUS
Status: DISCONTINUED | OUTPATIENT
Start: 2024-12-19 | End: 2024-12-20

## 2024-12-19 RX ORDER — HYDROMORPHONE HYDROCHLORIDE 1 MG/ML
0.2 INJECTION, SOLUTION INTRAMUSCULAR; INTRAVENOUS; SUBCUTANEOUS EVERY 5 MIN PRN
Status: DISCONTINUED | OUTPATIENT
Start: 2024-12-19 | End: 2024-12-19 | Stop reason: HOSPADM

## 2024-12-19 RX ORDER — BISACODYL 10 MG
10 SUPPOSITORY, RECTAL RECTAL
Status: DISCONTINUED | OUTPATIENT
Start: 2024-12-19 | End: 2024-12-20

## 2024-12-19 RX ORDER — HYDROCODONE BITARTRATE AND ACETAMINOPHEN 5; 325 MG/1; MG/1
2 TABLET ORAL ONCE AS NEEDED
Status: DISCONTINUED | OUTPATIENT
Start: 2024-12-19 | End: 2024-12-19 | Stop reason: HOSPADM

## 2024-12-19 RX ORDER — AMLODIPINE BESYLATE 5 MG/1
5 TABLET ORAL NIGHTLY
Status: DISCONTINUED | OUTPATIENT
Start: 2024-12-19 | End: 2024-12-20

## 2024-12-19 RX ORDER — OXYCODONE HYDROCHLORIDE 5 MG/1
5 TABLET ORAL EVERY 4 HOURS PRN
Status: DISCONTINUED | OUTPATIENT
Start: 2024-12-19 | End: 2024-12-20

## 2024-12-19 RX ORDER — HYDROMORPHONE HYDROCHLORIDE 1 MG/ML
0.6 INJECTION, SOLUTION INTRAMUSCULAR; INTRAVENOUS; SUBCUTANEOUS EVERY 5 MIN PRN
Status: DISCONTINUED | OUTPATIENT
Start: 2024-12-19 | End: 2024-12-19 | Stop reason: HOSPADM

## 2024-12-19 RX ORDER — ROCURONIUM BROMIDE 10 MG/ML
INJECTION, SOLUTION INTRAVENOUS AS NEEDED
Status: DISCONTINUED | OUTPATIENT
Start: 2024-12-19 | End: 2024-12-19 | Stop reason: SURG

## 2024-12-19 RX ORDER — EPHEDRINE SULFATE 50 MG/ML
INJECTION INTRAVENOUS AS NEEDED
Status: DISCONTINUED | OUTPATIENT
Start: 2024-12-19 | End: 2024-12-19 | Stop reason: SURG

## 2024-12-19 RX ORDER — HYDRALAZINE HYDROCHLORIDE 20 MG/ML
10 INJECTION INTRAMUSCULAR; INTRAVENOUS ONCE
Status: COMPLETED | OUTPATIENT
Start: 2024-12-19 | End: 2024-12-19

## 2024-12-19 RX ORDER — SODIUM CHLORIDE, SODIUM LACTATE, POTASSIUM CHLORIDE, CALCIUM CHLORIDE 600; 310; 30; 20 MG/100ML; MG/100ML; MG/100ML; MG/100ML
INJECTION, SOLUTION INTRAVENOUS CONTINUOUS
Status: DISCONTINUED | OUTPATIENT
Start: 2024-12-19 | End: 2024-12-19

## 2024-12-19 RX ORDER — LEVOFLOXACIN 5 MG/ML
500 INJECTION, SOLUTION INTRAVENOUS EVERY 24 HOURS
Status: DISCONTINUED | OUTPATIENT
Start: 2024-12-20 | End: 2024-12-20

## 2024-12-19 RX ORDER — OXYCODONE HYDROCHLORIDE 10 MG/1
10 TABLET ORAL EVERY 4 HOURS PRN
Status: DISCONTINUED | OUTPATIENT
Start: 2024-12-19 | End: 2024-12-20

## 2024-12-19 RX ORDER — MIDAZOLAM HYDROCHLORIDE 1 MG/ML
INJECTION INTRAMUSCULAR; INTRAVENOUS AS NEEDED
Status: DISCONTINUED | OUTPATIENT
Start: 2024-12-19 | End: 2024-12-19 | Stop reason: SURG

## 2024-12-19 RX ORDER — HYDROCODONE BITARTRATE AND ACETAMINOPHEN 5; 325 MG/1; MG/1
1 TABLET ORAL ONCE AS NEEDED
Status: DISCONTINUED | OUTPATIENT
Start: 2024-12-19 | End: 2024-12-19 | Stop reason: HOSPADM

## 2024-12-19 RX ORDER — DEXAMETHASONE SODIUM PHOSPHATE 4 MG/ML
VIAL (ML) INJECTION AS NEEDED
Status: DISCONTINUED | OUTPATIENT
Start: 2024-12-19 | End: 2024-12-19 | Stop reason: SURG

## 2024-12-19 RX ORDER — ACETAMINOPHEN 500 MG
1000 TABLET ORAL ONCE AS NEEDED
Status: DISCONTINUED | OUTPATIENT
Start: 2024-12-19 | End: 2024-12-19 | Stop reason: HOSPADM

## 2024-12-19 RX ORDER — ZOLPIDEM TARTRATE 10 MG/1
10 TABLET ORAL NIGHTLY PRN
Status: DISCONTINUED | OUTPATIENT
Start: 2024-12-19 | End: 2024-12-20

## 2024-12-19 RX ORDER — ENOXAPARIN SODIUM 100 MG/ML
40 INJECTION SUBCUTANEOUS NIGHTLY
Status: DISCONTINUED | OUTPATIENT
Start: 2024-12-19 | End: 2024-12-20

## 2024-12-19 RX ORDER — DOCUSATE SODIUM 100 MG/1
100 CAPSULE, LIQUID FILLED ORAL 2 TIMES DAILY
Qty: 28 CAPSULE | Refills: 0 | Status: SHIPPED | OUTPATIENT
Start: 2024-12-19 | End: 2025-01-02

## 2024-12-19 RX ORDER — OXYBUTYNIN CHLORIDE 5 MG/1
5 TABLET ORAL 3 TIMES DAILY PRN
Status: DISCONTINUED | OUTPATIENT
Start: 2024-12-19 | End: 2024-12-20

## 2024-12-19 RX ORDER — ONDANSETRON 2 MG/ML
INJECTION INTRAMUSCULAR; INTRAVENOUS
Status: COMPLETED
Start: 2024-12-19 | End: 2024-12-19

## 2024-12-19 RX ORDER — DEXTROSE MONOHYDRATE 25 G/50ML
50 INJECTION, SOLUTION INTRAVENOUS
Status: DISCONTINUED | OUTPATIENT
Start: 2024-12-19 | End: 2024-12-20

## 2024-12-19 RX ORDER — ONDANSETRON 4 MG/1
4 TABLET, ORALLY DISINTEGRATING ORAL EVERY 6 HOURS PRN
Status: DISCONTINUED | OUTPATIENT
Start: 2024-12-19 | End: 2024-12-20

## 2024-12-19 RX ORDER — SODIUM CHLORIDE, SODIUM LACTATE, POTASSIUM CHLORIDE, CALCIUM CHLORIDE 600; 310; 30; 20 MG/100ML; MG/100ML; MG/100ML; MG/100ML
INJECTION, SOLUTION INTRAVENOUS CONTINUOUS
Status: DISCONTINUED | OUTPATIENT
Start: 2024-12-19 | End: 2024-12-19 | Stop reason: HOSPADM

## 2024-12-19 RX ORDER — HYDROMORPHONE HYDROCHLORIDE 1 MG/ML
0.4 INJECTION, SOLUTION INTRAMUSCULAR; INTRAVENOUS; SUBCUTANEOUS EVERY 5 MIN PRN
Status: DISCONTINUED | OUTPATIENT
Start: 2024-12-19 | End: 2024-12-19 | Stop reason: HOSPADM

## 2024-12-19 RX ORDER — NEOSTIGMINE METHYLSULFATE 1 MG/ML
INJECTION INTRAVENOUS AS NEEDED
Status: DISCONTINUED | OUTPATIENT
Start: 2024-12-19 | End: 2024-12-19 | Stop reason: SURG

## 2024-12-19 RX ORDER — ONDANSETRON 2 MG/ML
4 INJECTION INTRAMUSCULAR; INTRAVENOUS EVERY 6 HOURS PRN
Status: DISCONTINUED | OUTPATIENT
Start: 2024-12-19 | End: 2024-12-20

## 2024-12-19 RX ORDER — NICOTINE POLACRILEX 4 MG
30 LOZENGE BUCCAL
Status: DISCONTINUED | OUTPATIENT
Start: 2024-12-19 | End: 2024-12-20

## 2024-12-19 RX ORDER — LEVOFLOXACIN 500 MG/1
500 TABLET, FILM COATED ORAL EVERY 24 HOURS
Status: DISCONTINUED | OUTPATIENT
Start: 2024-12-20 | End: 2024-12-20

## 2024-12-19 RX ORDER — FINASTERIDE 5 MG/1
5 TABLET, FILM COATED ORAL NIGHTLY
Status: DISCONTINUED | OUTPATIENT
Start: 2024-12-19 | End: 2024-12-20

## 2024-12-19 RX ORDER — LIDOCAINE HYDROCHLORIDE 20 MG/ML
JELLY TOPICAL AS NEEDED
Status: DISCONTINUED | OUTPATIENT
Start: 2024-12-19 | End: 2024-12-19 | Stop reason: HOSPADM

## 2024-12-19 RX ORDER — ONDANSETRON 2 MG/ML
INJECTION INTRAMUSCULAR; INTRAVENOUS AS NEEDED
Status: DISCONTINUED | OUTPATIENT
Start: 2024-12-19 | End: 2024-12-19 | Stop reason: SURG

## 2024-12-19 RX ORDER — SERTRALINE HYDROCHLORIDE 100 MG/1
200 TABLET, FILM COATED ORAL NIGHTLY
Status: DISCONTINUED | OUTPATIENT
Start: 2024-12-19 | End: 2024-12-20

## 2024-12-19 RX ORDER — LEVOFLOXACIN 5 MG/ML
500 INJECTION, SOLUTION INTRAVENOUS ONCE
Status: COMPLETED | OUTPATIENT
Start: 2024-12-19 | End: 2024-12-19

## 2024-12-19 RX ORDER — HYDROMORPHONE HYDROCHLORIDE 1 MG/ML
0.4 INJECTION, SOLUTION INTRAMUSCULAR; INTRAVENOUS; SUBCUTANEOUS EVERY 2 HOUR PRN
Status: DISCONTINUED | OUTPATIENT
Start: 2024-12-19 | End: 2024-12-20

## 2024-12-19 RX ORDER — ONDANSETRON 2 MG/ML
4 INJECTION INTRAMUSCULAR; INTRAVENOUS EVERY 6 HOURS PRN
Status: DISCONTINUED | OUTPATIENT
Start: 2024-12-19 | End: 2024-12-19 | Stop reason: HOSPADM

## 2024-12-19 RX ORDER — TAMSULOSIN HYDROCHLORIDE 0.4 MG/1
0.8 CAPSULE ORAL NIGHTLY
Status: DISCONTINUED | OUTPATIENT
Start: 2024-12-19 | End: 2024-12-20

## 2024-12-19 RX ORDER — DEXTROSE MONOHYDRATE 25 G/50ML
50 INJECTION, SOLUTION INTRAVENOUS
Status: DISCONTINUED | OUTPATIENT
Start: 2024-12-19 | End: 2024-12-19 | Stop reason: HOSPADM

## 2024-12-19 RX ORDER — ACETAMINOPHEN 500 MG
1000 TABLET ORAL ONCE
Status: DISCONTINUED | OUTPATIENT
Start: 2024-12-19 | End: 2024-12-19 | Stop reason: HOSPADM

## 2024-12-19 RX ORDER — PROCHLORPERAZINE EDISYLATE 5 MG/ML
5 INJECTION INTRAMUSCULAR; INTRAVENOUS ONCE
Status: COMPLETED | OUTPATIENT
Start: 2024-12-19 | End: 2024-12-19

## 2024-12-19 RX ORDER — CIPROFLOXACIN 500 MG/1
500 TABLET, FILM COATED ORAL 2 TIMES DAILY
Qty: 8 TABLET | Refills: 0 | Status: SHIPPED | OUTPATIENT
Start: 2024-12-19 | End: 2024-12-23

## 2024-12-19 RX ORDER — LEVOFLOXACIN 5 MG/ML
INJECTION, SOLUTION INTRAVENOUS
Status: DISPENSED
Start: 2024-12-19 | End: 2024-12-19

## 2024-12-19 RX ORDER — LABETALOL HYDROCHLORIDE 5 MG/ML
5 INJECTION, SOLUTION INTRAVENOUS EVERY 5 MIN PRN
Status: DISCONTINUED | OUTPATIENT
Start: 2024-12-19 | End: 2024-12-19 | Stop reason: HOSPADM

## 2024-12-19 RX ADMIN — ROCURONIUM BROMIDE 20 MG: 10 INJECTION, SOLUTION INTRAVENOUS at 11:39:00

## 2024-12-19 RX ADMIN — MIDAZOLAM HYDROCHLORIDE 2 MG: 1 INJECTION INTRAMUSCULAR; INTRAVENOUS at 11:07:00

## 2024-12-19 RX ADMIN — ROCURONIUM BROMIDE 50 MG: 10 INJECTION, SOLUTION INTRAVENOUS at 11:09:00

## 2024-12-19 RX ADMIN — ONDANSETRON 4 MG: 2 INJECTION INTRAMUSCULAR; INTRAVENOUS at 12:06:00

## 2024-12-19 RX ADMIN — SODIUM CHLORIDE, SODIUM LACTATE, POTASSIUM CHLORIDE, CALCIUM CHLORIDE: 600; 310; 30; 20 INJECTION, SOLUTION INTRAVENOUS at 11:04:00

## 2024-12-19 RX ADMIN — EPHEDRINE SULFATE 10 MG: 50 INJECTION INTRAVENOUS at 12:16:00

## 2024-12-19 RX ADMIN — NEOSTIGMINE METHYLSULFATE 4 MG: 1 INJECTION INTRAVENOUS at 12:30:00

## 2024-12-19 RX ADMIN — GLYCOPYRROLATE 0.8 MG: 0.2 INJECTION, SOLUTION INTRAMUSCULAR; INTRAVENOUS at 12:30:00

## 2024-12-19 RX ADMIN — DEXAMETHASONE SODIUM PHOSPHATE 4 MG: 4 MG/ML VIAL (ML) INJECTION at 11:39:00

## 2024-12-19 RX ADMIN — LIDOCAINE HYDROCHLORIDE 50 MG: 10 INJECTION, SOLUTION EPIDURAL; INFILTRATION; INTRACAUDAL; PERINEURAL at 11:09:00

## 2024-12-19 RX ADMIN — LEVOFLOXACIN 500 MG: 5 INJECTION, SOLUTION INTRAVENOUS at 11:12:00

## 2024-12-19 NOTE — ANESTHESIA PREPROCEDURE EVALUATION
PRE-OP EVALUATION    Patient Name: Delfin De Leon    Admit Diagnosis: Benign prostatic hyperplasia with lower urinary tract symptoms, symptom details unspecified [N40.1]  Bladder mass [N32.89]    Pre-op Diagnosis: Benign prostatic hyperplasia with lower urinary tract symptoms, symptom details unspecified [N40.1]  Bladder mass [N32.89]    CYSTOSCOPY, TRANSURETHRAL RESECTION PROSTATE WITH TRANSURETHRAL RESECTION OF BLADDER TUMOR    Anesthesia Procedure: CYSTOSCOPY, TRANSURETHRAL RESECTION PROSTATE WITH TRANSURETHRAL RESECTION OF BLADDER TUMOR    Surgeons and Role:     * Sam Davis MD - Primary    Pre-op vitals reviewed.  Temp: 97.5 °F (36.4 °C)  Pulse: 66  Resp: 18  BP: 129/78  SpO2: 96 %  Body mass index is 36.02 kg/m².    Current medications reviewed.  Hospital Medications:   [Transfer Hold] acetaminophen (Tylenol Extra Strength) tab 1,000 mg  1,000 mg Oral Once    [Transfer Hold] glucose (Dex4) 15 GM/59ML oral liquid 15 g  15 g Oral Q15 Min PRN    Or    [Transfer Hold] glucose (Glutose) 40% oral gel 15 g  15 g Oral Q15 Min PRN    Or    [Transfer Hold] glucose-vitamin C (Dex-4) chewable tab 4 tablet  4 tablet Oral Q15 Min PRN    Or    [Transfer Hold] dextrose 50% injection 50 mL  50 mL Intravenous Q15 Min PRN    Or    [Transfer Hold] glucose (Dex4) 15 GM/59ML oral liquid 30 g  30 g Oral Q15 Min PRN    Or    [Transfer Hold] glucose (Glutose) 40% oral gel 30 g  30 g Oral Q15 Min PRN    Or    [Transfer Hold] glucose-vitamin C (Dex-4) chewable tab 8 tablet  8 tablet Oral Q15 Min PRN    lactated ringers infusion   Intravenous Continuous    levoFLOXacin in dextrose 5% (Levaquin) 500 mg/100mL IVPB premix 500 mg  500 mg Intravenous Once    levoFLOXacin in dextrose 5% (Levaquin) 500 mg/100mL IVPB premix           Outpatient Medications:   Prescriptions Prior to Admission[1]    Allergies: Patient has no known allergies.      Anesthesia Evaluation    Patient summary reviewed.    Anesthetic Complications  (+) history of  anesthetic complications  History of: PONV       GI/Hepatic/Renal      (+) GERD and well controlled                          Cardiovascular                  (+) hypertension     (+) CAD                                Endo/Other      (+) diabetes   not using insulin  (-) hypothyroidism  (-) hyperthyroidism                     Pulmonary      (-) asthma  (-) COPD         (-) recent URI   (+) sleep apnea       Neuro/Psych             (-) TIA  (-) seizures                       Past Surgical History:   Procedure Laterality Date    Arthroscopy of joint unlisted      knee x2    Cath bare metal stent (bms)      Hernia surgery      Other surgical history      elbow wound    Other surgical history      vocal cord    Total hip replacement       Social History     Socioeconomic History    Marital status:    Tobacco Use    Smoking status: Never    Smokeless tobacco: Never   Vaping Use    Vaping status: Never Used   Substance and Sexual Activity    Alcohol use: Yes     Comment: 1x/month    Drug use: Never     History   Drug Use Unknown     Available pre-op labs reviewed.     Lab Results   Component Value Date     12/06/2024    K 4.5 12/06/2024     12/06/2024    CO2 27.0 12/06/2024    BUN 12 12/06/2024    CREATSERUM 1.14 12/06/2024     (H) 12/06/2024    CA 9.5 12/06/2024            Airway      Mallampati: IV  Mouth opening: 3 FB  TM distance: > 6 cm  Neck ROM: full Cardiovascular      Rhythm: regular       Dental    Dentition appears grossly intact         Pulmonary    Pulmonary exam normal.                 Other findings              ASA: 3   Plan: general  NPO status verified and           Plan/risks discussed with: patient                Present on Admission:  **None**             [1]   Medications Prior to Admission   Medication Sig Dispense Refill Last Dose/Taking    Cyanocobalamin (VITAMIN B 12) 500 MCG Oral Tab Take 1,000 mg by mouth daily.   12/18/2024 at  8:00 AM    ibuprofen 400 MG Oral Tab Take  1 tablet (400 mg total) by mouth every 6 (six) hours as needed for Pain.   12/5/2024    finasteride 5 MG Oral Tab Take 1 tablet (5 mg total) by mouth daily. 90 tablet 6 12/18/2024 at  7:00 PM    tamsulosin 0.4 MG Oral Cap Take 2 capsules (0.8 mg total) by mouth daily. Take 1/2 hour following the same meal each day 180 capsule 3 12/18/2024 at  7:00 PM    amLODIPine 5 MG Oral Tab Take 1 tablet (5 mg total) by mouth daily.   12/18/2024 at  7:00 PM    ascorbic acid 1000 MG Oral Tab Take 1 tablet (1,000 mg total) by mouth daily.   12/18/2024 at  8:00 AM    atorvastatin 40 MG Oral Tab Take 1 tablet (40 mg total) by mouth nightly.   12/18/2024 at  7:00 PM    Cholecalciferol 50 MCG (2000 UT) Oral Tab Take 1 tablet (2,000 Units total) by mouth daily.   12/18/2024 at  8:00 AM    metFORMIN 500 MG Oral Tab Take 1 tablet (500 mg total) by mouth daily with breakfast.   12/17/2024    Multiple Vitamin (DAILY VALUE MULTIVITAMIN) Oral Tab Take 1 tablet by mouth daily.   12/18/2024 at  8:00 AM    omeprazole 20 MG Oral Capsule Delayed Release Take 1 capsule (20 mg total) by mouth at bedtime.   12/18/2024 at  7:00 PM    pyridoxine 100 MG Oral Tab Take 1 tablet (100 mg total) by mouth daily.   12/18/2024 at  8:00 AM    sertraline 100 MG Oral Tab Take 2 tablets (200 mg total) by mouth at bedtime.   12/18/2024 at  7:00 PM    zolpidem 10 MG Oral Tab Take 1 tablet (10 mg total) by mouth nightly as needed.   12/5/2024

## 2024-12-19 NOTE — ANESTHESIA PROCEDURE NOTES
Airway  Date/Time: 12/19/2024 11:12 AM  Urgency: elective    Airway not difficult    General Information and Staff    Patient location during procedure: OR  Anesthesiologist: Carol Panchal MD  Performed: anesthesiologist   Performed by: Carol Panchal MD  Authorized by: Carol Panchal MD      Indications and Patient Condition  Indications for airway management: anesthesia  Sedation level: deep  Preoxygenated: yes  Patient position: sniffing  Mask difficulty assessment: 1 - vent by mask    Final Airway Details  Final airway type: endotracheal airway      Successful airway: ETT  Cuffed: yes   Successful intubation technique: direct laryngoscopy  Facilitating devices/methods: cricoid pressure  Endotracheal tube insertion site: oral  Blade: Christie  Blade size: #4  ETT size (mm): 7.5    Cormack-Lehane Classification: grade III - view of epiglottis only  Placement verified by: capnometry   Measured from: lips  ETT to lips (cm): 23  Number of attempts at approach: 1

## 2024-12-19 NOTE — OPERATIVE REPORT
Urology Operative Note    Attending Surgeon: Sam Davis MD    Patient Name: Delfin De Leon    Date of Surgery: 12/19/2024    Preoperative Diagnosis: BPH with severe LUTS, bladder lesion    Postoperative Diagnosis: same    Procedure Performed: Cystoscopy, transurethral resection of the prostate, bladder biopsy with fulguration    Indication for procedure:  Patient is a 69 year old male who presented with severe LUTS refractory to medical management as well as an erythematous lesion at the bladder dome. He was counseled on options and elected to undergo the aforementioned procedure. We discussed the risks and benefits to surgery. We discussed risks including, but not limited to, bleeding, infection, possible damage to surrounding structures. The patient understood these risks and wished to proceed with surgery.  Description of the procedure:  The patient was taken to the operating room and prepped and draped in lithotomy position after undergoing general anesthesia.   The cystoscope was inserted. He was again noted to have a severely obstructing prostate. We then proceeded with transurethral resection of the prostate. I dissected down to the prostatic capsule on all sides and fulgurated oozing vessels. The prostatic urethra was wide open at the end of the case. The TURP chips were removed.   I then biopsied the erythematous area at the bladder dome. It was somewhat difficult to do this due to the location but were able to take a couple biopsies and the base was fulgurated. There was no significant bleeding noted at the end of the case. I inserted a 22-Portuguese 3 way Betancourt catheter. The urine was clear on a low rate of bladder irrigation.   The patient was awoken having tolerated the procedure well.    Specimen: TURP chips, bladder biopsies    Complications: No known complications    Condition on Discharge from the operating room was stable    Plan: The patient will be admitted overnight and undergo a voiding  trial tomorrow morning.    Sam Davis MD  Date: 12/19/2024  Time: 12:57 PM

## 2024-12-19 NOTE — CONSULTS
[facility]   part of formerly [facility]    Medical Consult     Delfin De Leon Patient Status:  Outpatient in a Bed    10/1/1955 MRN [id number]   Location [facility] 3NW-A Attending Sam Davis MD   Hosp Day # 0 PCP Candelario Haddad DO     Chief Complaint: BPH with severe LUTS, bladder lesion    History of Present Illness: Delfin De Leon is a 69 year old male with history of cataracts, coronary disease with stents, depression, diabetes, diverticulosis, GERD, hypertension, hyperlipidemia, sleep apnea presenting with BPH with severe LUTS, bladder lesion status post cystoscopy with transurethral resection of the prostate with bladder biopsy and fulguration.  Patient denies any preoperative positive review of systems.  Patient's pain is currently controlled.  Patient denies any acute issues currently.    Past Medical History:  Past Medical History:    Cataract    early stages    Coronary atherosclerosis    Depression    Diabetes (HCC)    Diverticulosis of large intestine    Esophageal reflux    Hearing impaired person, bilateral    bilateral hearing aids    High blood pressure    High cholesterol    Hx of motion sickness    Migraines    in the past    PONV (postoperative nausea and vomiting)    Sleep apnea    NO TX        Past Surgical History:   Past Surgical History:   Procedure Laterality Date    Arthroscopy of joint unlisted      knee x2    Cath bare metal stent (bms)      Hernia surgery      Other surgical history      elbow wound    Other surgical history      vocal cord    Total hip replacement         Social History:  reports that he has never smoked. He has never used smokeless tobacco. He reports current alcohol use. He reports that he does not use drugs.No illegal drugs, , 6 children, working, no cane or walker    Family History: History reviewed. No pertinent family history.  Mother passed  Father passed  2 brothers living  0 sisters    Allergies: Allergies[1]    Medications:   Medications Ordered Prior to Encounter[2]    Review of Systems:   A comprehensive 14 point review of systems was completed.    Pertinent positives and negatives noted in the HPI.    Physical Exam:    BP (!) 173/84 (BP Location: Left arm)   Pulse 80   Temp 97.1 °F (36.2 °C) (Axillary)   Resp 18   Ht 6' 1\" (1.854 m)   Wt 273 lb (123.8 kg)   SpO2 94%   BMI 36.02 kg/m²   General: No acute distress. Alert and oriented x 3.  HEENT: Normocephalic atraumatic. Moist mucous membranes. EOM-I.  Neck: No JVD. No carotid bruits.  Respiratory: Clear to auscultation bilaterally. No wheezes. No crackles  Cardiovascular: S1, S2. Regular rate and rhythm. No murmurs  Chest and Back: No tenderness or deformity.  Abdomen: Soft, nontender, nondistended.  Positive bowel sounds. No rebound, guarding   Neurologic: No focal neurological deficits. CNII-XII grossly intact. Sensation and strength intact  Musculoskeletal: Moves all extremities.  Extremities: No edema or tenderness of the LE  Integument: No new rashes or lesions.   Psychiatric: Appropriate mood and affect.      Diagnostic Data:      Labs:  No results for input(s): \"WBC\", \"HGB\", \"MCV\", \"PLT\", \"BAND\", \"INR\" in the last 168 hours.    Invalid input(s): \"LYM#\", \"MONO#\", \"BASOS#\", \"EOSIN#\"    No results for input(s): \"GLU\", \"BUN\", \"CREATSERUM\", \"GFRAA\", \"GFRNAA\", \"CA\", \"ALB\", \"NA\", \"K\", \"CL\", \"CO2\", \"ALKPHO\", \"AST\", \"ALT\", \"BILT\", \"TP\" in the last 168 hours.    CrCl cannot be calculated (Patient's most recent lab result is older than the maximum 7 days allowed.).    No results for input(s): \"PTP\", \"INR\" in the last 168 hours.    No results for input(s): \"TROP\", \"CK\" in the last 168 hours.    Imaging: Imaging data reviewed in Epic.      -ASSESSMENT / PLAN:   69 year old male with history of cataracts, coronary disease with stents, depression, diabetes, diverticulosis, GERD, hypertension, hyperlipidemia, sleep apnea presenting with BPH with severe LUTS, bladder lesion status post  cystoscopy with transurethral resection of the prostate with bladder biopsy and fulguration.    BPH with severe LUTS, bladder lesion  -POD # 0 status post cystoscopy with transurethral resection of the prostate with bladder biopsy and fulguration.  -urology following  -finasteride  -tamsulosin    Type 2 DM  -hold home oral meds  -low dose insulin sliding scale    HTN  -sbp stable  -amlodipine    GERD  -pantoprazole    Depression  -sertraline     Quality:  DVT Prophylaxis: scd, lovenox  CODE status:   Betancourt: none    Plan of care discussed with patient and staff    Dispo: no discharge    MD Eligio Ren Hospitalist  [phone]                    -       [1] No Known Allergies  [2]   No current facility-administered medications on file prior to encounter.     Current Outpatient Medications on File Prior to Encounter   Medication Sig Dispense Refill    Cyanocobalamin (VITAMIN B 12) 500 MCG Oral Tab Take 1,000 mg by mouth daily.      ibuprofen 400 MG Oral Tab Take 1 tablet (400 mg total) by mouth every 6 (six) hours as needed for Pain.      finasteride 5 MG Oral Tab Take 1 tablet (5 mg total) by mouth daily. 90 tablet 6    tamsulosin 0.4 MG Oral Cap Take 2 capsules (0.8 mg total) by mouth daily. Take 1/2 hour following the same meal each day 180 capsule 3    amLODIPine 5 MG Oral Tab Take 1 tablet (5 mg total) by mouth daily.      ascorbic acid 1000 MG Oral Tab Take 1 tablet (1,000 mg total) by mouth daily.      atorvastatin 40 MG Oral Tab Take 1 tablet (40 mg total) by mouth nightly.      Cholecalciferol 50 MCG (2000 UT) Oral Tab Take 1 tablet (2,000 Units total) by mouth daily.      metFORMIN 500 MG Oral Tab Take 1 tablet (500 mg total) by mouth daily with breakfast.      Multiple Vitamin (DAILY VALUE MULTIVITAMIN) Oral Tab Take 1 tablet by mouth daily.      omeprazole 20 MG Oral Capsule Delayed Release Take 1 capsule (20 mg total) by mouth at bedtime.      pyridoxine 100 MG Oral Tab Take 1 tablet (100 mg  total) by mouth daily.      sertraline 100 MG Oral Tab Take 2 tablets (200 mg total) by mouth at bedtime.      zolpidem 10 MG Oral Tab Take 1 tablet (10 mg total) by mouth nightly as needed.

## 2024-12-19 NOTE — ANESTHESIA POSTPROCEDURE EVALUATION
[facility] Patient Status:  Outpatient in a Bed   Age/Gender 69 year old male MRN [id number]   Location [facility] POST ANESTHESIA CARE UNIT Attending Sam Davis MD   Hosp Day # 0 PCP Candelario Haddad DO       Anesthesia Post-op Note    CYSTOSCOPY, TRANSURETHRAL RESECTION PROSTATE WITH TRANSURETHRAL RESECTION OF BLADDER TUMOR    Procedure Summary       Date: 12/19/24 Room / Location:  MAIN OR 04 / [facility] MAIN OR    Anesthesia Start: 1104 Anesthesia Stop: 1246    Procedure: CYSTOSCOPY, TRANSURETHRAL RESECTION PROSTATE WITH TRANSURETHRAL RESECTION OF BLADDER TUMOR (Urethra) Diagnosis:       Benign prostatic hyperplasia with lower urinary tract symptoms, symptom details unspecified      Bladder mass      (Benign prostatic hyperplasia with lower urinary tract symptoms, symptom details unspecified [N40.1]Bladder mass [N32.89])    Surgeons: Sam Davis MD Anesthesiologist: Carol Panchal MD    Anesthesia Type: general ASA Status: 3            Anesthesia Type: general    Vitals Value Taken Time   /86 12/19/24 1301   Temp 97.4 °F (36.3 °C) 12/19/24 1250   Pulse 77 12/19/24 1309   Resp 15 12/19/24 1309   SpO2 97 % 12/19/24 1309   Vitals shown include unfiled device data.    Patient Location: PACU    Anesthesia Type: general    Airway Patency: patent    Postop Pain Control: adequate    Mental Status: preanesthetic baseline    Nausea/Vomiting: none    Cardiopulmonary/Hydration status: stable euvolemic    Complications: no apparent anesthesia related complications    Postop vital signs: stable    Dental Exam: Unchanged from Preop    Patient to be discharged from PACU when criteria met.

## 2024-12-19 NOTE — PROGRESS NOTES
A&Ox4. VSS. RA. . EVANS  Telemetry: NSR  GI: Abdomen soft, nondistended.  Denies nausea.  : CBI running slow/moderate  Pain controlled with PRN pain medications  Up with standby assist.  Drains: 3 way hunter  Diet: clears, ADAT  IVF running per order.  All appropriate safety measures in place. Patient updated on plan of care. All questions and concerns addressed.

## 2024-12-19 NOTE — H&P
HPI:     Delfin De Leon is a 69 year old male with a PMH of anxiety, HTN, HL, DM, GERD.    Following for:  1. BPH with severe LUTS  - flomax 7/18/24, proscar 9/4/24  2. Chronic prostatitis since ~ 2010  - typically clears with abx  3. AMH  - cy[facility] + [facility] [place] showing acute on chronic cystitis    PCP - Boo [place])  Prior Urologist - Liliam 8/16/24, Brenda 7/18/24, Lacie - [place]  LOV 9/4/2024    Presents for cysto, TURP, TURBT.    He feels well.  He is taking 0.8 mg flomax, proscar, completed 4 w levaquin. Urinary symptoms are stable and still bothersome.  Most bothered by frequency, weak stream    Hip/back pain: L hip pain  Diarrhea/constipation: none  Sits ~ 8-10 h per day.  Exercise: none  Snoring: yes - has CPAP but not using and hasn't been evaluated in > 10 y. Would like to repeat.    AUA SS is 25/35 with 5 n, s, w, f, MARCIANO - was 29/35 prior to flomax. Feels terrible.  Incontinence: none    UA is negative and PVR is 69 mL - was 101 mL    UTI hx: none  Gross hematuria: none  Tobacco hx: none  Kidney stone hx: none  Fam h/o  malignancy: none    Poor potency and prefers observation    PSA 1.08 7/12/24    Drinks no water, > 32 soda with medium yellow urine.    CTU 8/13/24: focal BWT (right lateral)  Cysto LOV: mod BPH with mod ADONIS. ~ 2 cm papillary erythematous lesion at the bladder dome    We reviewed OAB tx and prevention strategies at today's visit and I provided educational materials for this. I encouraged the patient to drink at least 40-60 oz water per day or enough to keep urine clear. I also reviewed dietary irritants and provided educational materals for this.     We discussed surgical options for severe BPH/LUTS and he is interested in TURP with resection of bladder tumor. We discussed the risks and benefits to the procedure including, but not limited to, bleeding, infection, possible damage to surrounding structures. The patient understands and would like to  proceed.    We also discussed PFT as an option for pelvic floor issues and he wants to try this.    He will increase water intake and cut back on dietary irritants to help with OAB symptoms. Continue flomax and starting proscar for BPH/LUTS. PFT ordered for pelvic pain. Repeat sleep study ordered. OR for TURP with resection of bladder mass.    HISTORY:  Past Medical History:    Cataract    early stages    Coronary atherosclerosis    Depression    Diabetes (HCC)    Diverticulosis of large intestine    Esophageal reflux    Hearing impaired person, bilateral    bilateral hearing aids    High blood pressure    High cholesterol    Hx of motion sickness    Migraines    in the past    PONV (postoperative nausea and vomiting)    Sleep apnea    NO TX      Past Surgical History:   Procedure Laterality Date    Arthroscopy of joint unlisted      knee x2    Cath bare metal stent (bms)      Hernia surgery      Other surgical history      elbow wound    Other surgical history      vocal cord    Total hip replacement        History reviewed. No pertinent family history.   Social History:   Social History     Socioeconomic History    Marital status:    Tobacco Use    Smoking status: Never    Smokeless tobacco: Never   Vaping Use    Vaping status: Never Used   Substance and Sexual Activity    Alcohol use: Yes     Comment: 1x/month    Drug use: Never        Medications (Active prior to today's visit):  No current outpatient medications on file.       Allergies:  No Known Allergies      ROS:     A comprehensive 10 point review of systems was completed.  Pertinent positives and negatives noted in the the HPI.    PHYSICAL EXAM:     GENERAL APPEARANCE: well, developed, well nourished, in no acute distress  NEUROLOGIC: nonfocal, alert and oriented  HEAD: normocephalic, atraumatic  EYES: sclera non-icteric  EARS: hearing intact  ORAL CAVITY: mucosa moist  NECK/THYROID: no obvious goiter or masses  LUNGS: nonlabored  breathing  ABDOMEN: soft, no obvious masses or tenderness  SKIN: no obvious rashes    : as noted above    ASSESSMENT/PLAN:   Diagnoses and all orders for this visit:    Pre-op testing  -     Basic Metabolic Panel (8); Future  -     EKG 12 Lead; Future    Other orders  -     Vital signs; Standing  -     Notify anesthesia vital signs; Standing  -     PAT to instruct patients; Standing  -     acetaminophen (Tylenol Extra Strength) tab 1,000 mg  -     If patient has home continuous glucose monitor, PAT to instruct patient to leave continuous glucose monitor at home; Standing  -     Prior to surgery, home continuous glucose monitor must be removed by the patient.; Standing  -     Accucheck; Standing  -     Notify Anesthesiologist (specify); Standing  -     Notify Anesthesiologist (specify); Standing  -     Notify Anesthesiologist (specify); Standing  -     Implement Hypoglycemia Protocol; Standing  -     Call anesthesia for any needed insulin orders; Standing  -     glucose (Dex4) 15 GM/59ML oral liquid 15 g  -     glucose (Glutose) 40% oral gel 15 g  -     glucose-vitamin C (Dex-4) chewable tab 4 tablet  -     dextrose 50% injection 50 mL  -     glucose (Dex4) 15 GM/59ML oral liquid 30 g  -     glucose (Glutose) 40% oral gel 30 g  -     glucose-vitamin C (Dex-4) chewable tab 8 tablet  -     NPO; Standing  -     Insert/Maintain PIV; Standing  -     lactated ringers infusion  -     Activity as tolerated Until Discontinued; Standing  -     Height and weight; Standing  -     Initiate adult preop prophylactic antibiotic protocol; Standing  -     Verify informed consent; Standing  -     levoFLOXacin in dextrose 5% (Levaquin) 500 mg/100mL IVPB premix 500 mg  -     levoFLOXacin in dextrose 5% (Levaquin) 500 mg/100mL IVPB premix  -     POCT Glucose; Standing    - as noted above.    Thanks again for this consult.    Sam Davis MD, FACS  Urologist  [facility]  Office: [phone]

## 2024-12-19 NOTE — PLAN OF CARE
NURSING ADMISSION NOTE      Patient admitted via  bed  Oriented to room.  Safety precautions initiated.  Bed in low position.  Call light in reach.    Problem: Patient/Family Goals  Goal: Patient/Family Long Term Goal  Description: Patient's Long Term Goal: Go home    Interventions:  - absence of urinary retention  -return to adl baseline  -pain tolerable with orals  - See additional Care Plan goals for specific interventions  Outcome: Progressing  Goal: Patient/Family Short Term Goal  Description: Patient's Short Term Goal: feel better    Interventions:   - non-pharmalogical comfort  -early and frequent ambulation  -scheduled pain medications  -educate on recovery process    - See additional Care Plan goals for specific interventions  Outcome: Progressing     Reviewed plan of care with patient, pain management plan. Hospitalist consulted co-medical management/DM 2. Skin check completed with Deena PCT, bruising on abdomen, no other skin breakdown noted. CBI running. Telemetry applied. SCD's in place.

## 2024-12-19 NOTE — DISCHARGE INSTRUCTIONS
You had a procedure called a TURP today    - No heavy lifting or strenuous activity for 4 WEEKS.    - You may have a post-operative appointment that is already scheduled for you. If the appointment date or time does not work with your schedule please call our office to reschedule ([phone]). Alternatively our office may be reaching out to you to schedule the appointment.     - You may experience pain after the procedure for 1-2 days.  If pain becomes intolerable please contact our office or go to the nearest Emergency Room/Immediate Care. You make take over-the counter ibuprofen for mild pain (provided you do not have a medical condition such as stomach ulcers or kidney disease which prohibits you from taking). You may take this in addition to tylenol or narcotic pain medication. Hot packs may help for discomfort as well.    - If you take blood thinners (such as aspirin or plavix) please DO NOT take them when you go home. You may resume these medications in 4 WEEKS.    - If you are medically allowed to take ibuprofen then you may take 200-400 mg every 8 hours as needed for pain with plenty of fluids. You may take this in addition to tylenol or other pain medication which may be prescribed.     - You may experience burning with urination and frequency of urination over the next few days.     - You may see blood in your urine that should clear up within a few days. If you have a stent in place then you may see blood in the urine until the stent is eventually removed.     - Try to abstain from alcohol, coffee, tea, artificial sweeteners, and spicy food for the next 48 hours as these can irritate the bladder.     - If you develop a fever, chills, difficulty urinating or abdominal pain in the next 24 hours, call the office.     - Try to drink at least 1.5 to 2 liters of fluid per day to stay hydrated, water is preferable. If you are on a fluid restriction due to other medical reasons then you need to adhere to your  fluid restriction recommendations.

## 2024-12-20 VITALS
WEIGHT: 273 LBS | RESPIRATION RATE: 18 BRPM | TEMPERATURE: 98 F | OXYGEN SATURATION: 99 % | BODY MASS INDEX: 36.18 KG/M2 | HEART RATE: 63 BPM | DIASTOLIC BLOOD PRESSURE: 84 MMHG | SYSTOLIC BLOOD PRESSURE: 157 MMHG | HEIGHT: 73 IN

## 2024-12-20 LAB
GLUCOSE BLD-MCNC: 131 MG/DL (ref 70–99)
GLUCOSE BLD-MCNC: 179 MG/DL (ref 70–99)

## 2024-12-20 NOTE — DISCHARGE SUMMARY
[facility]   part of [facility]    Discharge Summary    Delfin De Leon Patient Status:  Outpatient in a Bed    10/1/1955 MRN [id number]   Location [facility] 3NW-A Attending Sam Davis MD   Hosp Day # 0 PCP Candelario Haddad DO     Date of Admission: 2024   Disposition: Home    Date of Discharge: 2024    Admitting Diagnosis: Benign prostatic hyperplasia with lower urinary tract symptoms, symptom details unspecified [N40.1]  Bladder mass [N32.89]    Discharge Diagnosis: BPH with LUTS, bladder lesion    Reason for Admission: post operative CBI    Physical Exam: see progress note dated 24    History of Present Illness: 69 year old male who presented with severe LUTS refractory to medical management as well as an erythematous lesion at the bladder dome. He was counseled on options and elected to undergo the aforementioned procedure. We discussed the risks and benefits to surgery. We discussed risks including, but not limited to, bleeding, infection, possible damage to surrounding structures. The patient understood these risks and wished to proceed with surgery.     Hospital Course: After informed consent was obtained for cystoscopy, transurethral resection of prostate, the patient was brought to the OR where aforementioned procedure was completed without any intraoperative complication. Patient was transferred from PACU to the floor without event. Patient progressed as expected on the floor. CBI has been weaned and clarity remains satisfactory for void trial. Pt was unable to void and hunter was re-inserted. Plan for outpt void trial in a few days. Patient is tolerating advancement of diet, pain is well controlled on oral pain medications, and patient is ambulatory. Restrictions and post op instructions were reviewed.     Consultations: hospitalist    Procedures: Cystoscopy, transurethral resection of the prostate, bladder biopsy with fulguration     Complications:  none    Discharge Condition: Good         Discharge Medications:      Discharge Medications        START taking these medications        Instructions Prescription details   ciprofloxacin 500 MG Tabs  Commonly known as: Cipro      Take 1 tablet (500 mg total) by mouth 2 (two) times daily for 4 days.   Stop taking on: December 23, 2024  Quantity: 8 tablet  Refills: 0     docusate sodium 100 MG Caps  Commonly known as: Colace      Take 1 capsule (100 mg total) by mouth 2 (two) times daily for 14 days. Stop taking if loose stools/diarrhea.   Stop taking on: January 2, 2025  Quantity: 28 capsule  Refills: 0     HYDROcodone-acetaminophen 5-325 MG Tabs  Commonly known as: Norco      Take 1 tablet by mouth every 6 (six) hours as needed for Pain.   Quantity: 30 tablet  Refills: 0            CONTINUE taking these medications        Instructions Prescription details   amLODIPine 5 MG Tabs  Commonly known as: Norvasc      Take 1 tablet (5 mg total) by mouth daily.   Refills: 0     ascorbic acid 1000 MG Tabs  Commonly known as: VITAMIN C      Take 1 tablet (1,000 mg total) by mouth daily.   Refills: 0     atorvastatin 40 MG Tabs  Commonly known as: Lipitor      Take 1 tablet (40 mg total) by mouth nightly.   Refills: 0     Cholecalciferol 50 MCG (2000 UT) Tabs      Take 1 tablet (2,000 Units total) by mouth daily.   Refills: 0     Daily Value Multivitamin Tabs      Take 1 tablet by mouth daily.   Refills: 0     finasteride 5 MG Tabs  Commonly known as: Proscar      Take 1 tablet (5 mg total) by mouth daily.   Quantity: 90 tablet  Refills: 6     ibuprofen 400 MG Tabs  Commonly known as: Motrin      Take 1 tablet (400 mg total) by mouth every 6 (six) hours as needed for Pain.   Refills: 0     metFORMIN 500 MG Tabs  Commonly known as: Glucophage      Take 1 tablet (500 mg total) by mouth daily with breakfast.   Refills: 0     omeprazole 20 MG Cpdr  Commonly known as: PriLOSEC      Take 1 capsule (20 mg total) by mouth at bedtime.    Refills: 0     pyridoxine 100 MG Tabs  Commonly known as: Vitamin B6      Take 1 tablet (100 mg total) by mouth daily.   Refills: 0     sertraline 100 MG Tabs  Commonly known as: Zoloft      Take 2 tablets (200 mg total) by mouth at bedtime.   Refills: 0     tamsulosin 0.4 MG Caps  Commonly known as: Flomax      Take 2 capsules (0.8 mg total) by mouth daily. Take 1/2 hour following the same meal each day   Quantity: 180 capsule  Refills: 3     zolpidem 10 MG Tabs  Commonly known as: Ambien      Take 1 tablet (10 mg total) by mouth nightly as needed.   Refills: 0            STOP taking these medications      Vitamin B 12 500 MCG Tabs                  Where to Get Your Medications        These medications were sent to [address], [facility] [phone], [phone]  [address] [place] [address]      Phone: [phone]   ciprofloxacin 500 MG Tabs  docusate sodium 100 MG Caps  HYDROcodone-acetaminophen 5-325 MG Tabs         Follow up Visits:   Future Appointments   Date Time Provider Department Center   1/8/2025 10:30 AM Brenda Albarado PA-C [id number] [facility] 4   4/18/2025 10:45 AM Sam Davis MD [id number] [facility] 4         Other Discharge Instructions:   You had a procedure called a TURP today    - No heavy lifting or strenuous activity for 4 WEEKS.    - You may have a post-operative appointment that is already scheduled for you. If the appointment date or time does not work with your schedule please call our office to reschedule ([phone]). Alternatively our office may be reaching out to you to schedule the appointment.     - You may experience pain after the procedure for 1-2 days.  If pain becomes intolerable please contact our office or go to the nearest Emergency Room/Immediate Care. You make take over-the counter ibuprofen for mild pain (provided you do not have a medical condition such as stomach ulcers or kidney disease which prohibits  you from taking). You may take this in addition to tylenol or narcotic pain medication. Hot packs may help for discomfort as well.    - If you take blood thinners (such as aspirin or plavix) please DO NOT take them when you go home. You may resume these medications in 4 WEEKS.    - If you are medically allowed to take ibuprofen then you may take 200-400 mg every 8 hours as needed for pain with plenty of fluids. You may take this in addition to tylenol or other pain medication which may be prescribed.     - You may experience burning with urination and frequency of urination over the next few days.     - You may see blood in your urine that should clear up within a few days. If you have a stent in place then you may see blood in the urine until the stent is eventually removed.     - Try to abstain from alcohol, coffee, tea, artificial sweeteners, and spicy food for the next 48 hours as these can irritate the bladder.     - If you develop a fever, chills, difficulty urinating or abdominal pain in the next 24 hours, call the office.     - Try to drink at least 1.5 to 2 liters of fluid per day to stay hydrated, water is preferable. If you are on a fluid restriction due to other medical reasons then you need to adhere to your fluid restriction recommendations.      Brenda Bains PA-C  [facility]  12/20/2024

## 2024-12-20 NOTE — PLAN OF CARE
Pt alert and oriented x4. VSS on RA.  Nsr on tele.  Pt denies chest pain, sob, nausea or emesis. Scds on. Lovenox for dvt prophylaxis.  Abdomen soft nondistended. Denies passing gas.  3way hunter catheter with CBI running slow, clear-pink tinged urine. Ivf running per order. Tolerating clears.  Pt updated on poc, call light within reach     0630:hunter removed per order. Bladder filled w/200cc.  Pt instructed to call when he urinates for bladder scan

## 2024-12-20 NOTE — PLAN OF CARE
Patient is alert and oriented. On room air. Tele with NSR. Abdomen is soft/ non-distended. Patient denies passing gas. Patient denies nausea. Hunter removed this morning. Patient is currently due to void. Saline locked. Tolerating diet. Discharge today pending voiding trial.     1557:  Patient urinated 50 ml  ml. Second time patient urinated 100 ml  ml. Patient then urinated a third time and he voided 150 ml .Dr. Davis made aware. Orders received for placement of a 22 fr hunter catheter.     1615: Hunter catheter placed. 325 ml of red urine with small clots returned in hunter catheter.

## 2024-12-20 NOTE — PROGRESS NOTES
[facility]   part of [facility]    Progress Note    Delfin De Leon Patient Status:  Outpatient in a Bed    10/1/1955 MRN [id number]   Location [facility] 3NW-A Attending Sam Davis MD   Hosp Day # 0 PCP Candelario Haddad, DO     Subjective:   Delfin De Leon is a(n) 69 year old male s/p TURP and bladder biopsy 24, path pending.    Feels well, denies CP/SOB  Tolerating diet  Hunter removed this morning, awaiting void.    Objective:   Blood pressure 113/75, pulse 81, temperature 98.2 °F (36.8 °C), temperature source Oral, resp. rate 18, height 6' 1\" (1.854 m), weight 273 lb (123.8 kg), SpO2 95%.    No distress  Non labored respirations    Results:   Lab Results   Component Value Date    CREATSERUM 1.14 2024    BUN 12 2024     2024    K 4.5 2024     2024    CO2 27.0 2024     (H) 2024    CA 9.5 2024       No results found.        Assessment & Plan:   BPH with LUTS  Bladder lesion  S/p TURP and bladder biopsy with fulguration 24, path pending  AF, HD stable  VT underway    PLAN - Discharge home today +/- hunter pending results of VT. Restrictions reviewed. If unable to void and hunter replaced will repeat VT on Monday.    Future Appointments   Date Time Provider Department Center   2025 10:30 AM Brenda Bains PA-C [id number] EC [facility] 4   2025 10:45 AM Sam Davis MD [id number] EC [facility] 4     Brenda Bains PA-C  [facility]  2024

## 2024-12-20 NOTE — PROGRESS NOTES
[facility] Hospitalist Progress Note                                                                     [facility]   part of [facility]  10/1/1955    SUBJECTIVE: Chest pain, palpitations, shortness of breath, cough, nausea, vomiting, abdominal pain.  Patient's postoperative pain controlled.    OBJECTIVE:  Temp:  [97.1 °F (36.2 °C)-99.1 °F (37.3 °C)] 98.2 °F (36.8 °C)  Pulse:  [62-91] 81  Resp:  [10-19] 18  BP: (104-179)/() 113/75  SpO2:  [93 %-100 %] 95 %  Exam  Gen: No acute distress, alert and oriented  Pulm: Lungs clear bilaterally, normal respiratory effort,no crackles, no wheezing  CV: Heart with regular rate and rhythm, no murmur.    Abd: Abdomen soft, nontender, nondistended, bowel sounds present  MSK: No significant pitting edema or tenderness of the lower extremities  Skin: no new rashes or lesions    Labs:   No results for input(s): \"WBC\", \"HGB\", \"MCV\", \"PLT\", \"BAND\", \"INR\" in the last 168 hours.    Invalid input(s): \"LYM#\", \"MONO#\", \"BASOS#\", \"EOSIN#\"    No results for input(s): \"NA\", \"K\", \"CL\", \"CO2\", \"BUN\", \"CREATSERUM\", \"CA\", \"CAION\", \"MG\", \"PHOS\", \"GLU\" in the last 168 hours.    No results for input(s): \"ALT\", \"AST\", \"ALB\", \"AMYLASE\", \"LIPASE\", \"LDH\" in the last 168 hours.    Invalid input(s): \"ALPHOS\", \"TBIL\", \"DBIL\", \"TPROT\"    Recent Labs   Lab 12/19/24  0903 12/19/24  1246 12/19/24  1713 12/19/24  2104 12/20/24  0517   PGLU 167* 152* 208* 155* 131*       Meds:   Scheduled:    amLODIPine  5 mg Oral Nightly    finasteride  5 mg Oral Nightly    pantoprazole  20 mg Oral Nightly    sertraline  200 mg Oral Nightly    tamsulosin  0.8 mg Oral Nightly    enoxaparin  40 mg Subcutaneous Nightly    acetaminophen  1,000 mg Oral Q8H YEE    levofloxacin  500 mg Intravenous Q24H    Or    levoFLOXacin  500 mg Oral Q24H    insulin aspart  1-5 Units Subcutaneous TID AC and HS     Continuous Infusions:    sodium chloride 100 mL/hr at  12/19/24 2119    sodium chloride       PRN:   zolpidem    oxyCODONE **OR** oxyCODONE    HYDROmorphone **OR** HYDROmorphone    polyethylene glycol (PEG 3350)    sennosides    bisacodyl    ondansetron **OR** ondansetron    glucose **OR** glucose **OR** glucose-vitamin C **OR** dextrose **OR** glucose **OR** glucose **OR** glucose-vitamin C    oxybutynin    -ASSESSMENT / PLAN:   69 year old male with history of cataracts, coronary disease with stents, depression, diabetes, diverticulosis, GERD, hypertension, hyperlipidemia, sleep apnea presenting with BPH with severe LUTS, bladder lesion status post cystoscopy with transurethral resection of the prostate with bladder biopsy and fulguration.     BPH with severe LUTS, bladder lesion  -POD # 1 status post cystoscopy with transurethral resection of the prostate with bladder biopsy and fulguration.  -urology following  -finasteride  -tamsulosin     Type 2 DM  -hold home oral meds, resume on dc  -low dose insulin sliding scale     HTN  -sbp stable  -amlodipine     GERD  -pantoprazole     Depression  -sertraline      Quality:  DVT Prophylaxis: scd, lovenox  CODE status:   Betancourt: none     Plan of care discussed with patient and staff     Dispo: medically stable for discharge     Adolfo Baugh MD  [facility] Hospital[facility]  [phone]

## 2024-12-21 NOTE — PROGRESS NOTES
NURSING DISCHARGE NOTE    Discharged Home via Wheelchair.  Accompanied by Spouse  Belongings Taken by patient/family.    IV taken out. Betancourt catheter bag exchange demonstrated with patient. Betancourt care educated to patient. Supplies given. Prescriptions delivered by [facility]. Patient left unit via wheelchair in stable condition.

## 2024-12-23 ENCOUNTER — TELEPHONE (OUTPATIENT)
Dept: SURGERY | Facility: CLINIC | Age: 69
End: 2024-12-23

## 2024-12-23 ENCOUNTER — NURSE ONLY (OUTPATIENT)
Dept: SURGERY | Facility: CLINIC | Age: 69
End: 2024-12-23

## 2024-12-23 VITALS — HEART RATE: 97 BPM | TEMPERATURE: 98 F | SYSTOLIC BLOOD PRESSURE: 121 MMHG | DIASTOLIC BLOOD PRESSURE: 88 MMHG

## 2024-12-23 DIAGNOSIS — R33.9 URINARY RETENTION: Primary | ICD-10-CM

## 2024-12-23 PROCEDURE — 51798 US URINE CAPACITY MEASURE: CPT | Performed by: UROLOGY

## 2024-12-23 NOTE — TELEPHONE ENCOUNTER
Patient is calling because he had surgery Thursday last week and want to get the hunter catheter removed as soon as possible. Please contact patient.

## 2024-12-23 NOTE — PROGRESS NOTES
Patient here for Nurse Visit. Hunter Catheter Removal .  Patient ambulated into exam room and onto exam table without assistance.  Currently has 20Fr. (30mL balloon) Hunter Catheter  draining clear yellow urine into hunter leg bag secured with velcro straps right leg.  Hunter Catheter secured with hunter stat-lock right upper thigh.  Hunter Stat-lock removed with alcohol pad - skin intact.  Removed entire contents Hunter Catheter Balloon (30mL), Hunter Catheter removed with catheter intact - small amount bloody drainage noted on catheter tip.  Patient C/O nausea - patient diabetic and verbalizes did not eat breakfast this morning and had taken his metformin yesterday evening.  Crackers and juice given to patient - patient verbalizes feeling better after eating crackers and juice.  98.1 - 121/88- pulse 97.  Patient ambulated out of exam room without assistance and sat in waiting room - feeling better and left office.  Patient to return this afternoon Nurse Visit for PVR.  Patient instructed to monitor urine output at home, drink plenty of water - 80z every hour if tolerates. If unable to urinate, or abdominal pain/pressure, or pelvic pain/pressure, then patient to return to office ASAP sooner than afternoon Nurse Visit.  Patient verbalizes understanding

## 2024-12-24 ENCOUNTER — TELEPHONE (OUTPATIENT)
Dept: SURGERY | Facility: CLINIC | Age: 69
End: 2024-12-24

## 2024-12-24 NOTE — TELEPHONE ENCOUNTER
Message forwarded to Missy Quinones PA-C: Currently taking tamsulosin 0.4mg once daily - States was taking 2 tabs daily (=0.8mg) but when discharged from hospital was told take 0.4mg daily.  Should take tamsulosin 0.4mg daily or 0.8mg daily? Message forwarded to Missy Quinones PA-C to address.     Per Missy Quinones PA-C: He can do 0.4 mg.  If he feels his urinary symptoms are worsening he can go back up to 0.8.     [phone] Left message voicemail requesting return call re: response/recommendation.  Also sent patient MYChart message.

## 2024-12-24 NOTE — PROGRESS NOTES
Patient here for Nurse Visit PVR. Betancourt Catheter removed earlier this morning.  PVR 75mL.  Denies abdominal pain/pressure, Denies Pelvic pain/pressure.  Verbalizes he had urinated one time at home without difficulty and urinated one time in office without difficulty. - Clear light red urine noted.  Instructed patient to continue drinking plenty of water. If unable to urinate, or urinating very little - ER ASAP.  Patient verbalizes understanding  Verified with patient currently taking Ciprofloxacin 500mg BID - has 2 doses left to complete 4 days.  Currently taking tamsulosin 0.4mg once daily - States was taking 2 tabs daily (=0.8mg) but when discharged from hospital was told take 0.4mg daily.  Should take tamsulosin 0.4mg daily or 0.8mg daily? Message forwarded to Missy Quinones PA-C to address.     Patient has appointment 1/8/2025 Brenda Albarado PA-C (2-4 weeks post-op)  Patient instructed to keep scheduled appointment.

## 2024-12-26 NOTE — TELEPHONE ENCOUNTER
Pt returned call to discuss below.   Pt c/o nausea.   Denies vomiting, pain, fever, gross hematuria, and inability to urinate.     Recommended pt to push water and try the BRAT diet.     Pt would like a refill of Zofran.   For your review.

## 2024-12-27 RX ORDER — ONDANSETRON 4 MG/1
4 TABLET, FILM COATED ORAL EVERY 8 HOURS PRN
Qty: 10 TABLET | Refills: 0 | Status: SHIPPED | OUTPATIENT
Start: 2024-12-27

## 2024-12-27 NOTE — TELEPHONE ENCOUNTER
Called pt to discuss below, no answer, left message to call back.     CLAUDINE Louis: Please sign script if appropriate.     AtwiMissy PA-C  You16 hours ago (5:46 PM)     Can refill zofran but maybe get checked out at UC or PCP to make sure zofran not masking something else. Make sure no constipation following surgery.

## 2025-01-08 ENCOUNTER — OFFICE VISIT (OUTPATIENT)
Dept: SURGERY | Facility: CLINIC | Age: 70
End: 2025-01-08

## 2025-01-08 DIAGNOSIS — R82.90 URINE FINDING: ICD-10-CM

## 2025-01-08 DIAGNOSIS — N40.1 BPH WITH OBSTRUCTION/LOWER URINARY TRACT SYMPTOMS: Primary | ICD-10-CM

## 2025-01-08 DIAGNOSIS — N13.8 BPH WITH OBSTRUCTION/LOWER URINARY TRACT SYMPTOMS: Primary | ICD-10-CM

## 2025-01-08 LAB
APPEARANCE: CLEAR
BILIRUBIN: NEGATIVE
GLUCOSE (URINE DIPSTICK): NEGATIVE MG/DL
KETONES (URINE DIPSTICK): NEGATIVE MG/DL
MULTISTIX LOT#: ABNORMAL NUMERIC
NITRITE, URINE: NEGATIVE
PH, URINE: 5.5 (ref 4.5–8)
SPECIFIC GRAVITY: 1.01 (ref 1–1.03)
URINE-COLOR: YELLOW
UROBILINOGEN,SEMI-QN: 0.2 MG/DL (ref 0–1.9)

## 2025-01-08 PROCEDURE — 99024 POSTOP FOLLOW-UP VISIT: CPT | Performed by: PHYSICIAN ASSISTANT

## 2025-01-08 PROCEDURE — 81003 URINALYSIS AUTO W/O SCOPE: CPT | Performed by: PHYSICIAN ASSISTANT

## 2025-01-08 NOTE — PROGRESS NOTES
Subjective:   Delfin De Leon is a 69 year old male with hx of anxiety, HTN, HL, DM, GERD, who presents for post op check following TURP 12/19/24 with Dr. Davis.      Doing great, good stream, no incontinence.  Very happy with experience.    PVR 45mL  History/Other:    No Further Nursing Notes to Review         Current Outpatient Medications   Medication Sig Dispense Refill    ondansetron (ZOFRAN) 4 mg tablet Take 1 tablet (4 mg total) by mouth every 8 (eight) hours as needed for Nausea. 10 tablet 0    HYDROcodone-acetaminophen (NORCO) 5-325 MG Oral Tab Take 1 tablet by mouth every 6 (six) hours as needed for Pain. 30 tablet 0    ibuprofen 400 MG Oral Tab Take 1 tablet (400 mg total) by mouth every 6 (six) hours as needed for Pain.      finasteride 5 MG Oral Tab Take 1 tablet (5 mg total) by mouth daily. 90 tablet 6    tamsulosin 0.4 MG Oral Cap Take 2 capsules (0.8 mg total) by mouth daily. Take 1/2 hour following the same meal each day 180 capsule 3    amLODIPine 5 MG Oral Tab Take 1 tablet (5 mg total) by mouth daily.      ascorbic acid 1000 MG Oral Tab Take 1 tablet (1,000 mg total) by mouth daily.      atorvastatin 40 MG Oral Tab Take 1 tablet (40 mg total) by mouth nightly.      Cholecalciferol 50 MCG (2000 UT) Oral Tab Take 1 tablet (2,000 Units total) by mouth daily.      metFORMIN 500 MG Oral Tab Take 1 tablet (500 mg total) by mouth daily with breakfast.      Multiple Vitamin (DAILY VALUE MULTIVITAMIN) Oral Tab Take 1 tablet by mouth daily.      omeprazole 20 MG Oral Capsule Delayed Release Take 1 capsule (20 mg total) by mouth at bedtime.      pyridoxine 100 MG Oral Tab Take 1 tablet (100 mg total) by mouth daily.      sertraline 100 MG Oral Tab Take 2 tablets (200 mg total) by mouth at bedtime.      zolpidem 10 MG Oral Tab Take 1 tablet (10 mg total) by mouth nightly as needed.         Review of Systems:  Pertinent items are noted in HPI.      Objective:   There were no vitals taken for this visit.  Estimated body mass index is 36.02 kg/m² as calculated from the following:    Height as of 11/26/24: 6' 1\" (1.854 m).    Weight as of 12/19/24: 273 lb (123.8 kg).    No distress  Non labored respirations    Laboratory Data:  No results found for: \"WBC\", \"HGB\", \"PLT\"  Lab Results   Component Value Date     12/06/2024    K 4.5 12/06/2024     12/06/2024    CO2 27.0 12/06/2024    BUN 12 12/06/2024     (H) 12/06/2024    CA 9.5 12/06/2024       Urinalysis Results (last three years):  Recent Labs     07/18/24  0949 08/16/24  1020 08/27/24  0800   COLORUR  --   --  Light-Yellow   CLARITY  --   --  Clear   SPECGRAVITY 1.020 1.025 1.019   PHURINE 6.0 6.0 5.5   PROUR  --   --  Trace*   GLUUR  --   --  Normal   KETUR  --   --  Negative   BILUR  --   --  Negative   BLOODURINE  --   --  Negative   NITRITE Negative Negative Negative   UROBILINOGEN  --   --  Normal   LEUUR  --   --  250*   WBCUR  --   --  >50*   RBCUR  --   --  3-5*   BACUR  --   --  None Seen       Urine Culture Results (last three years):  Lab Results   Component Value Date    URINECUL No Growth 2 Days 08/27/2024       Imaging  No results found.    Assessment & Plan:   BPH with LUTS s/p TURP and bladder biopsy 12/19/24  Pathology benign  Patient very happy with results, doing well    Restrictions reviewed  Continue tamsulosin and finasteride  Follow up as scheduled.    Future Appointments   Date Time Provider Department Center   4/18/2025 10:45 AM Sam Davis MD [id number]  [facility] 4       Brenda Bains PA-C, 1/8/2025

## 2025-01-09 ENCOUNTER — PATIENT MESSAGE (OUTPATIENT)
Dept: SURGERY | Facility: CLINIC | Age: 70
End: 2025-01-09

## 2025-03-05 ENCOUNTER — TELEPHONE (OUTPATIENT)
Dept: SURGERY | Facility: CLINIC | Age: 70
End: 2025-03-05

## 2025-03-05 NOTE — TELEPHONE ENCOUNTER
Patient called to discuss below.  Pt c/o \"penis pain/pressure\", frequency, urgency, and cloudy urine.   Pt had TURP on 12/19/24.  Symptoms remind him of when he had prostatitis.    Recommended OV.   Appt made for Friday, 3/7/25.   Encourage pt to increase water intake and avoid bladder irritants.   ER if symptoms worsen.   Pt agreeable to plan.

## 2025-03-05 NOTE — TELEPHONE ENCOUNTER
Patient states that he has a TURP procedure done and he is concerned about having pain in the penis for the past 4 days and feeling run down. Patient states that his post op appointment is scheduled for 4/18/2025.

## 2025-03-07 ENCOUNTER — OFFICE VISIT (OUTPATIENT)
Dept: SURGERY | Facility: CLINIC | Age: 70
End: 2025-03-07
Payer: COMMERCIAL

## 2025-03-07 DIAGNOSIS — N41.0 ACUTE PROSTATITIS: ICD-10-CM

## 2025-03-07 DIAGNOSIS — N40.1 BPH WITH OBSTRUCTION/LOWER URINARY TRACT SYMPTOMS: Primary | ICD-10-CM

## 2025-03-07 DIAGNOSIS — N13.8 BPH WITH OBSTRUCTION/LOWER URINARY TRACT SYMPTOMS: Primary | ICD-10-CM

## 2025-03-07 LAB
APPEARANCE: CLEAR
BILIRUBIN: NEGATIVE
GLUCOSE (URINE DIPSTICK): NEGATIVE MG/DL
KETONES (URINE DIPSTICK): NEGATIVE MG/DL
MULTISTIX LOT#: ABNORMAL NUMERIC
NITRITE, URINE: NEGATIVE
OCCULT BLOOD: NEGATIVE
PH, URINE: 6.5 (ref 4.5–8)
PROTEIN (URINE DIPSTICK): NEGATIVE MG/DL
SPECIFIC GRAVITY: 1.01 (ref 1–1.03)
URINE-COLOR: YELLOW
UROBILINOGEN,SEMI-QN: 0.2 MG/DL (ref 0–1.9)

## 2025-03-07 PROCEDURE — 51798 US URINE CAPACITY MEASURE: CPT | Performed by: UROLOGY

## 2025-03-07 PROCEDURE — 99213 OFFICE O/P EST LOW 20 MIN: CPT | Performed by: UROLOGY

## 2025-03-07 PROCEDURE — 81002 URINALYSIS NONAUTO W/O SCOPE: CPT | Performed by: UROLOGY

## 2025-03-07 RX ORDER — SULFAMETHOXAZOLE AND TRIMETHOPRIM 800; 160 MG/1; MG/1
1 TABLET ORAL 2 TIMES DAILY
Qty: 56 TABLET | Refills: 0 | Status: SHIPPED | OUTPATIENT
Start: 2025-03-07 | End: 2025-04-04

## 2025-03-07 NOTE — PROGRESS NOTES
HPI:     Delfin De Leon is a 69 year old male with a PMH of anxiety, HTN, HL, DM, GERD.    Following for:  1. BPH with severe LUTS  - s/p TURP 12/19/24: 8.2 g with severe inflammation/prostatitis  - flomax 7/18/24, proscar 9/4/24  2. Chronic prostatitis since ~ 2010  - typically clears with abx  3. AMH  - cysto + bladder bx 8/10/23 (Lacie CULVER) showing acute on chronic cystitis    PCP - Boo [place])  Prior Urologist - Liliam 8/16/24, Brenda 7/18/24, Lacie CULVER    Presents to discuss sudden onset UTI symptoms.    He feels well. Has felt great following TURP but for the past week has had UTI symptoms - urgency, dysuria. This is a recurrent issue for him. Doesn't drink much water.  He discontinued flomax, still taking proscar.    Hip/back pain: L hip pain  Diarrhea/constipation: none  Sits ~ 8-10 h per day.  Exercise: none  Snoring: yes - has CPAP but not using and hasn't been evaluated in > 10 y. Would like to repeat.    AUA SS is 25/35 with 5 n, s, w, f, MARCIANO - was 29/35 prior to flomax. Feels terrible.  Incontinence: none    UA is s/f UTI and PVR is 84 mL - 69 mL - was 101 mL    UTI hx: none  Gross hematuria: none  Tobacco hx: none  Kidney stone hx: none  Fam h/o  malignancy: none    Poor potency and prefers observation    PSA 1.08 7/12/24    Reported no water, > 32 soda with medium yellow urine. Still drinking about the same, more water.    CTU 8/13/24: focal BWT (right lateral)  Cysto today: mod BPH with mod ADONIS. ~ 2 cm papillary erythematous lesion at the bladder dome    We discussed options and he wants to start 4 weeks abx for presumed prostatitis (reports he has required 4-8 weeks abx in the past)    I encouraged the pt drink at least 40-60 oz water per day or enough to keep urine clear to pale yellow for UTI prevention.    We also discussed PFT as an option for pelvic floor issues and he wants to try this.    He will increase water intake and cut back on dietary irritants to help with OAB  symptoms. Resume flomax and continue proscar for BPH/LUTS. PFT ordered for pelvic pain. Repeat sleep study ordered. Starting 4 weeks abx for prostatitis.  I wrote down and reviewed instructions with him.    HISTORY:  Past Medical History:    Arthritis    Cataract    early stages    Coronary atherosclerosis    Depression    Diabetes (HCC)    Diverticulosis of large intestine    Esophageal reflux    Essential hypertension    Hearing impaired person, bilateral    bilateral hearing aids    High blood pressure    High cholesterol    Hx of motion sickness    Hyperlipidemia    Migraines    in the past    PONV (postoperative nausea and vomiting)    Sleep apnea    NO TX      Past Surgical History:   Procedure Laterality Date    Angioplasty (coronary)  2019    Arthroscopy of joint unlisted      knee x2    Cath bare metal stent (bms)      Colonoscopy  2022    Hernia surgery      Hip replacement surgery  2022    Other surgical history      elbow wound    Other surgical history      vocal cord    Tonsillectomy  1961    Total hip replacement      Vasectomy  2001      Family History   Problem Relation Age of Onset    Hypertension Father     Hypertension Brother       Social History:   Social History     Socioeconomic History    Marital status:    Tobacco Use    Smoking status: Never    Smokeless tobacco: Never   Vaping Use    Vaping status: Never Used   Substance and Sexual Activity    Alcohol use: Yes     Alcohol/week: 1.0 standard drink of alcohol     Types: 1 Cans of beer per week     Comment: NAbout 1 beer a month    Drug use: Never     Social Drivers of Health     Food Insecurity: No Food Insecurity (12/19/2024)    Food Insecurity     Food Insecurity: Never true   Transportation Needs: No Transportation Needs (12/19/2024)    Transportation Needs     Lack of Transportation: No   Housing Stability: Low Risk  (12/19/2024)    Housing Stability     Housing Instability: No        Medications (Active prior to today's  visit):  Current Outpatient Medications   Medication Sig Dispense Refill    sulfamethoxazole-trimethoprim -160 MG Oral Tab per tablet Take 1 tablet by mouth 2 (two) times daily for 28 days. 56 tablet 0    ibuprofen 400 MG Oral Tab Take 1 tablet (400 mg total) by mouth every 6 (six) hours as needed for Pain.      finasteride 5 MG Oral Tab Take 1 tablet (5 mg total) by mouth daily. 90 tablet 6    amLODIPine 5 MG Oral Tab Take 1 tablet (5 mg total) by mouth daily.      ascorbic acid 1000 MG Oral Tab Take 1 tablet (1,000 mg total) by mouth daily.      atorvastatin 40 MG Oral Tab Take 1 tablet (40 mg total) by mouth nightly.      Cholecalciferol 50 MCG (2000 UT) Oral Tab Take 1 tablet (2,000 Units total) by mouth daily.      metFORMIN 500 MG Oral Tab Take 1 tablet (500 mg total) by mouth daily with breakfast.      Multiple Vitamin (DAILY VALUE MULTIVITAMIN) Oral Tab Take 1 tablet by mouth daily.      omeprazole 20 MG Oral Capsule Delayed Release Take 1 capsule (20 mg total) by mouth at bedtime.      pyridoxine 100 MG Oral Tab Take 1 tablet (100 mg total) by mouth daily.      sertraline 100 MG Oral Tab Take 2 tablets (200 mg total) by mouth at bedtime.      zolpidem 10 MG Oral Tab Take 1 tablet (10 mg total) by mouth nightly as needed.         Allergies:  No Known Allergies      ROS:     A comprehensive 10 point review of systems was completed.  Pertinent positives and negatives noted in the the HPI.    PHYSICAL EXAM:     GENERAL APPEARANCE: well, developed, well nourished, in no acute distress  NEUROLOGIC: nonfocal, alert and oriented  HEAD: normocephalic, atraumatic  EYES: sclera non-icteric  EARS: hearing intact  ORAL CAVITY: mucosa moist  NECK/THYROID: no obvious goiter or masses  LUNGS: nonlabored breathing  ABDOMEN: soft, no obvious masses or tenderness  SKIN: no obvious rashes    : as noted above    ASSESSMENT/PLAN:   Diagnoses and all orders for this visit:    BPH with obstruction/lower urinary tract  symptoms  -     URINALYSIS NONAUTO W/O SCOPE  -     Urine Culture, Routine    Acute prostatitis  -     sulfamethoxazole-trimethoprim -160 MG Oral Tab per tablet; Take 1 tablet by mouth 2 (two) times daily for 28 days.    - as noted above.    Thanks again for this consult.    Sam Davis MD, FACS  Urologist  [facility]  Office: [phone]

## 2025-03-07 NOTE — PATIENT INSTRUCTIONS
1. Increase water intake to 40-60 ounces (2 liters) per day or enough to keep urine clear to pale yellow.  2. Cut back on dietary irritants such as coffee, tea, soda, juice.

## 2025-03-21 ENCOUNTER — OFFICE VISIT (OUTPATIENT)
Dept: FAMILY MEDICINE CLINIC | Facility: CLINIC | Age: 70
End: 2025-03-21
Payer: COMMERCIAL

## 2025-03-21 VITALS
SYSTOLIC BLOOD PRESSURE: 120 MMHG | DIASTOLIC BLOOD PRESSURE: 80 MMHG | TEMPERATURE: 98 F | HEIGHT: 73 IN | HEART RATE: 87 BPM | OXYGEN SATURATION: 99 % | RESPIRATION RATE: 16 BRPM | BODY MASS INDEX: 36.63 KG/M2 | WEIGHT: 276.38 LBS

## 2025-03-21 DIAGNOSIS — G47.33 OSA (OBSTRUCTIVE SLEEP APNEA): ICD-10-CM

## 2025-03-21 DIAGNOSIS — R06.09 DOE (DYSPNEA ON EXERTION): ICD-10-CM

## 2025-03-21 DIAGNOSIS — I25.10 CORONARY ARTERY DISEASE INVOLVING NATIVE CORONARY ARTERY OF NATIVE HEART WITHOUT ANGINA PECTORIS: ICD-10-CM

## 2025-03-21 DIAGNOSIS — R53.83 FATIGUE, UNSPECIFIED TYPE: Primary | ICD-10-CM

## 2025-03-21 PROCEDURE — 99204 OFFICE O/P NEW MOD 45 MIN: CPT | Performed by: STUDENT IN AN ORGANIZED HEALTH CARE EDUCATION/TRAINING PROGRAM

## 2025-03-21 RX ORDER — MULTIVITAMIN WITH IRON
50 TABLET ORAL DAILY
COMMUNITY

## 2025-03-21 RX ORDER — TAMSULOSIN HYDROCHLORIDE 0.4 MG/1
0.4 CAPSULE ORAL DAILY
COMMUNITY
Start: 2025-03-09

## 2025-03-21 NOTE — PROGRESS NOTES
Subjective:      Chief Complaint   Patient presents with    Shortness Of Breath     Easily tired, no stamina - states he had a couple of stents put in    Other     Currently has UTI - taking Bactrim     HISTORY OF PRESENT ILLNESS  HPI  HPI obtained per patient report.  Delfin De Leon is a pleasant 69 year old male presenting with fatigue and MOE.     He is here with his wife today.     He reports fatigue, daytime drowsiness, and shortness of breath upon exertion for at least 2 months. Exertion level includes walking from the parking lot to his office and taking out the trash. He denies any CP or dizziness. He has a diagnosis of EVANS but was unable to tolerate any treatments.     PAST PATIENT HISTORY  Past Medical History:    Arthritis    Cataract    early stages    Coronary atherosclerosis    Depression    Diabetes (HCC)    Diverticulosis of large intestine    Esophageal reflux    Essential hypertension    Hearing impaired person, bilateral    bilateral hearing aids    High blood pressure    High cholesterol    Hx of motion sickness    Hyperlipidemia    Migraines    in the past    PONV (postoperative nausea and vomiting)    Sleep apnea    NO TX     Past Surgical History:   Procedure Laterality Date    Angioplasty (coronary)  2019    Arthroscopy of joint unlisted      knee x2    Cath bare metal stent (bms)      Colonoscopy  2022    Hernia surgery      Hip replacement surgery  2022    Other surgical history      elbow wound    Other surgical history      vocal cord    Tonsillectomy  1961    Total hip replacement      Vasectomy  2001       CURRENT MEDICATIONS  Medications Taking[1]    HEALTH MAINTENANCE  Immunization History   Administered Date(s) Administered    Covid-19 Vaccine Moderna Bivalent 50mcg/0.5mL 12+ years 10/25/2022    FLU VAC High Dose 65 YRS & Older PRSV Free (99393) 10/25/2022    FLU VAC QIV SPLIT 3 YRS AND OLDER (61958) 09/21/2019    FLUAD High Dose 65 yr and older (92522) 09/24/2024    FLUZONE 6  months and older PFS 0.5 ml (69607) 10/04/2018    Moderna Covid-19 Vaccine 50mcg/0.5ml 12yrs+ 03/15/2024    Pfizer Covid-19 Vaccine 30mcg/0.3ml 12yrs+ 09/24/2024    Zoster Vaccine Recombinant Adjuvanted (Shingrix) 05/07/2018, 02/28/2019       ALLERGIES AND DRUG REACTIONS  Allergies[2]    Family History   Problem Relation Age of Onset    Hypertension Father     Heart Disorder Father         Valve failure    Hypertension Brother     Cancer Brother         Multiple Myeloma    Heart Disorder Mother         Valve failure     Social History     Socioeconomic History    Marital status:    Tobacco Use    Smoking status: Never    Smokeless tobacco: Never   Vaping Use    Vaping status: Never Used   Substance and Sexual Activity    Alcohol use: Yes     Alcohol/week: 1.0 standard drink of alcohol     Types: 1 Cans of beer per week     Comment: NAbout 1 beer a month    Drug use: Never   Other Topics Concern    Caffeine Concern No    Exercise No    Seat Belt No    Special Diet No    Stress Concern No    Weight Concern Yes     Social Drivers of Health     Food Insecurity: No Food Insecurity (12/19/2024)    Food Insecurity     Food Insecurity: Never true   Transportation Needs: No Transportation Needs (12/19/2024)    Transportation Needs     Lack of Transportation: No   Housing Stability: Low Risk  (12/19/2024)    Housing Stability     Housing Instability: No       Review of Systems   Constitutional:  Positive for fatigue.   Respiratory:  Positive for shortness of breath.    All other systems reviewed and are negative.         Objective:      /80   Pulse 87   Temp 97.5 °F (36.4 °C) (Temporal)   Resp 16   Ht 6' 1\" (1.854 m)   Wt 276 lb 6 oz (125.4 kg)   SpO2 99%   BMI 36.46 kg/m²   Body mass index is 36.46 kg/m².    Physical Exam  Vitals reviewed.   Constitutional:       General: He is not in acute distress.     Appearance: He is not ill-appearing, toxic-appearing or diaphoretic.   HENT:      Head: Normocephalic  and atraumatic.   Eyes:      General: No scleral icterus.        Right eye: No discharge.         Left eye: No discharge.      Extraocular Movements: Extraocular movements intact.      Conjunctiva/sclera: Conjunctivae normal.   Cardiovascular:      Rate and Rhythm: Normal rate and regular rhythm.      Heart sounds: Normal heart sounds.   Pulmonary:      Effort: Pulmonary effort is normal.      Breath sounds: Normal breath sounds.   Abdominal:      General: There is no distension.   Musculoskeletal:      Cervical back: Neck supple. No tenderness.      Right lower leg: No edema.      Left lower leg: No edema.   Lymphadenopathy:      Cervical: No cervical adenopathy.   Neurological:      Mental Status: He is alert and oriented to person, place, and time.   Psychiatric:         Mood and Affect: Mood normal.            Assessment and Plan:      1. Fatigue, unspecified type (Primary)  -     CARD TREADMILL STRESS, ADULT (CPT=93017); Future; Expected date: 03/21/2025  -     CBC With Differential With Platelet  -     Comp Metabolic Panel (14)  -     TSH W Reflex To Free T4  -     Lipid Panel  -     OP REFERRAL TO CARDIOLOGY  2. MOE (dyspnea on exertion)  -     CARD TREADMILL STRESS, ADULT (CPT=93017); Future; Expected date: 03/21/2025  -     CBC With Differential With Platelet  -     Comp Metabolic Panel (14)  -     TSH W Reflex To Free T4  -     Lipid Panel  -     OP REFERRAL TO CARDIOLOGY  3. Coronary artery disease involving native coronary artery of native heart without angina pectoris  4. EVANS (obstructive sleep apnea)    Return for review lab results, review imaging results.    Fatigue, MOE  - history of CAD s/p stent placement  - recommended updating labs and completing a stress test for further evaluation   - recommended consultation with cardiologist and provided referral information today  - recommended calling if symptoms worsen or if new symptoms develop while his evaluation is pending   - we discussed that  untreated EVANS may be contributing to his symptoms but he is not interested in considering treatment at this time     Patient verbalized understanding of assessment and recommendations. All questions and concerns were addressed.    Electronically signed by Nima Canales MD         [1]   Outpatient Medications Marked as Taking for the 3/21/25 encounter (Office Visit) with Nima Canales MD   Medication Sig Dispense Refill    tamsulosin 0.4 MG Oral Cap Take 1 capsule (0.4 mg total) by mouth daily.      vitamin B-12 50 MCG Oral Tab Take 1 tablet (50 mcg total) by mouth daily.      sulfamethoxazole-trimethoprim -160 MG Oral Tab per tablet Take 1 tablet by mouth 2 (two) times daily for 28 days. 56 tablet 0    ibuprofen 400 MG Oral Tab Take 1 tablet (400 mg total) by mouth every 6 (six) hours as needed for Pain.      finasteride 5 MG Oral Tab Take 1 tablet (5 mg total) by mouth daily. 90 tablet 6    amLODIPine 5 MG Oral Tab Take 1 tablet (5 mg total) by mouth daily.      ascorbic acid 1000 MG Oral Tab Take 1 tablet (1,000 mg total) by mouth daily.      atorvastatin 40 MG Oral Tab Take 1 tablet (40 mg total) by mouth nightly.      Cholecalciferol 50 MCG (2000 UT) Oral Tab Take 1 tablet (2,000 Units total) by mouth daily.      metFORMIN 500 MG Oral Tab Take 1 tablet (500 mg total) by mouth daily with breakfast.      Multiple Vitamin (DAILY VALUE MULTIVITAMIN) Oral Tab Take 1 tablet by mouth daily.      omeprazole 20 MG Oral Capsule Delayed Release Take 1 capsule (20 mg total) by mouth at bedtime.      pyridoxine 100 MG Oral Tab Take 1 tablet (100 mg total) by mouth daily.      sertraline 100 MG Oral Tab Take 2 tablets (200 mg total) by mouth at bedtime.      zolpidem 10 MG Oral Tab Take 1 tablet (10 mg total) by mouth nightly as needed.     [2] No Known Allergies

## 2025-03-22 ENCOUNTER — LAB ENCOUNTER (OUTPATIENT)
Dept: LAB | Age: 70
End: 2025-03-22
Attending: STUDENT IN AN ORGANIZED HEALTH CARE EDUCATION/TRAINING PROGRAM
Payer: COMMERCIAL

## 2025-03-22 LAB
ALBUMIN SERPL-MCNC: 4.8 G/DL (ref 3.2–4.8)
ALBUMIN/GLOB SERPL: 2.2 {RATIO} (ref 1–2)
ALP LIVER SERPL-CCNC: 78 U/L
ALT SERPL-CCNC: 33 U/L
ANION GAP SERPL CALC-SCNC: 10 MMOL/L (ref 0–18)
AST SERPL-CCNC: 26 U/L (ref ?–34)
BASOPHILS # BLD AUTO: 0 X10(3) UL (ref 0–0.2)
BASOPHILS NFR BLD AUTO: 0 %
BILIRUB SERPL-MCNC: 0.6 MG/DL (ref 0.2–1.1)
BUN BLD-MCNC: 14 MG/DL (ref 9–23)
CALCIUM BLD-MCNC: 9.7 MG/DL (ref 8.7–10.6)
CHLORIDE SERPL-SCNC: 105 MMOL/L (ref 98–112)
CHOLEST SERPL-MCNC: 129 MG/DL (ref ?–200)
CO2 SERPL-SCNC: 24 MMOL/L (ref 21–32)
CREAT BLD-MCNC: 1.17 MG/DL
EGFRCR SERPLBLD CKD-EPI 2021: 67 ML/MIN/1.73M2 (ref 60–?)
EOSINOPHIL # BLD AUTO: 0 X10(3) UL (ref 0–0.7)
EOSINOPHIL NFR BLD AUTO: 0 %
ERYTHROCYTE [DISTWIDTH] IN BLOOD BY AUTOMATED COUNT: 14.2 %
FASTING PATIENT LIPID ANSWER: YES
FASTING STATUS PATIENT QL REPORTED: YES
GLOBULIN PLAS-MCNC: 2.2 G/DL (ref 2–3.5)
GLUCOSE BLD-MCNC: 132 MG/DL (ref 70–99)
HCT VFR BLD AUTO: 38 %
HDLC SERPL-MCNC: 44 MG/DL (ref 40–59)
HGB BLD-MCNC: 13.2 G/DL
IMM GRANULOCYTES # BLD AUTO: 0.01 X10(3) UL (ref 0–1)
IMM GRANULOCYTES NFR BLD: 0.2 %
LDLC SERPL CALC-MCNC: 67 MG/DL (ref ?–100)
LYMPHOCYTES # BLD AUTO: 1.41 X10(3) UL (ref 1–4)
LYMPHOCYTES NFR BLD AUTO: 31.5 %
MCH RBC QN AUTO: 29 PG (ref 26–34)
MCHC RBC AUTO-ENTMCNC: 34.7 G/DL (ref 31–37)
MCV RBC AUTO: 83.5 FL
MONOCYTES # BLD AUTO: 0.41 X10(3) UL (ref 0.1–1)
MONOCYTES NFR BLD AUTO: 9.2 %
NEUTROPHILS # BLD AUTO: 2.65 X10 (3) UL (ref 1.5–7.7)
NEUTROPHILS # BLD AUTO: 2.65 X10(3) UL (ref 1.5–7.7)
NEUTROPHILS NFR BLD AUTO: 59.1 %
NONHDLC SERPL-MCNC: 85 MG/DL (ref ?–130)
OSMOLALITY SERPL CALC.SUM OF ELEC: 290 MOSM/KG (ref 275–295)
PLATELET # BLD AUTO: 142 10(3)UL (ref 150–450)
POTASSIUM SERPL-SCNC: 4.3 MMOL/L (ref 3.5–5.1)
PROT SERPL-MCNC: 7 G/DL (ref 5.7–8.2)
RBC # BLD AUTO: 4.55 X10(6)UL
SODIUM SERPL-SCNC: 139 MMOL/L (ref 136–145)
TRIGL SERPL-MCNC: 95 MG/DL (ref 30–149)
TSI SER-ACNC: 2.31 UIU/ML (ref 0.55–4.78)
VLDLC SERPL CALC-MCNC: 14 MG/DL (ref 0–30)
WBC # BLD AUTO: 4.5 X10(3) UL (ref 4–11)

## 2025-03-22 PROCEDURE — 80061 LIPID PANEL: CPT | Performed by: STUDENT IN AN ORGANIZED HEALTH CARE EDUCATION/TRAINING PROGRAM

## 2025-03-22 PROCEDURE — 36415 COLL VENOUS BLD VENIPUNCTURE: CPT | Performed by: STUDENT IN AN ORGANIZED HEALTH CARE EDUCATION/TRAINING PROGRAM

## 2025-03-22 PROCEDURE — 85025 COMPLETE CBC W/AUTO DIFF WBC: CPT | Performed by: STUDENT IN AN ORGANIZED HEALTH CARE EDUCATION/TRAINING PROGRAM

## 2025-03-22 PROCEDURE — 80053 COMPREHEN METABOLIC PANEL: CPT | Performed by: STUDENT IN AN ORGANIZED HEALTH CARE EDUCATION/TRAINING PROGRAM

## 2025-03-22 PROCEDURE — 84443 ASSAY THYROID STIM HORMONE: CPT | Performed by: STUDENT IN AN ORGANIZED HEALTH CARE EDUCATION/TRAINING PROGRAM

## 2025-03-24 NOTE — PROGRESS NOTES
Hi Mr. De Leon,     Your lab results showed a mildly low hematocrit and platelet count and a mildly elevated blood sugar level, but otherwise were normal. These laboratory findings do not explain your symptoms, and it would be recommended for your PCP to monitor these levels over the next several months. Please proceed with your stress test for further evaluation.     Dr. Canales

## 2025-03-26 ENCOUNTER — TELEPHONE (OUTPATIENT)
Dept: SURGERY | Facility: CLINIC | Age: 70
End: 2025-03-26

## 2025-03-26 NOTE — TELEPHONE ENCOUNTER
Called pt and relayed below.  Pt states he is drinking ONLY water and completing a sitz bath every night.   Appt made for tomorrow, 3/27 for evaluation.       Brenda Albarado, Kala Enriquez RN; [facility] Clinical Staff33 minutes ago (3:10 PM)     Follow up in office best for evaluation. Wouldn’t want to change abx without eval because may not be needed. Make sure he has eliminated soda and drinking more water. Sitz bathes helpful too

## 2025-03-26 NOTE — TELEPHONE ENCOUNTER
Per patient calling stating he is experiencing an intense burning sensation at the end of his penis. Per patient is asking if his dosage should be increased, or if his medication should be changed entirely. Per patient also asking if he should be seen by Dr. Davis before any changes are made. Please advise.

## 2025-03-26 NOTE — TELEPHONE ENCOUNTER
Rec'd Patient transfer call from Call Center  Last OV 3/7/25, TURP 12/19/24  Follow-up 11/18/25  Patient reports having constant penile pain(6/10 pain scale) x 1 week. Patient states has always had the pain but has increased.  Currently taking Bactrim  mg BID x 28 days, on day 19.   Patient report still having frequency/urgency and pressure at penis following steady stream.  Patient hydrating well and denies usage of bladder irritants.    Patient states that he does not believe that antibiotic is effective for his symptoms.  Rn instructed patient that if symptoms worsen to go to ER.    CLAUDINE Gutierrez: For your review and recommendation.

## 2025-03-26 NOTE — TELEPHONE ENCOUNTER
Phoned Patient to discuss below.  No answer, left voice message to call [phone] ext. [phone].

## 2025-03-27 ENCOUNTER — OFFICE VISIT (OUTPATIENT)
Dept: SURGERY | Facility: CLINIC | Age: 70
End: 2025-03-27
Payer: COMMERCIAL

## 2025-03-27 DIAGNOSIS — N41.0 ACUTE PROSTATITIS: Primary | ICD-10-CM

## 2025-03-27 DIAGNOSIS — R33.9 URINARY RETENTION: ICD-10-CM

## 2025-03-27 LAB
APPEARANCE: CLEAR
BILIRUBIN: NEGATIVE
GLUCOSE (URINE DIPSTICK): NEGATIVE MG/DL
KETONES (URINE DIPSTICK): NEGATIVE MG/DL
MULTISTIX LOT#: ABNORMAL NUMERIC
NITRITE, URINE: NEGATIVE
PH, URINE: 5.5 (ref 4.5–8)
PROTEIN (URINE DIPSTICK): 30 MG/DL
SPECIFIC GRAVITY: 1.02 (ref 1–1.03)
URINE-COLOR: YELLOW
UROBILINOGEN,SEMI-QN: 0.2 MG/DL (ref 0–1.9)

## 2025-03-27 PROCEDURE — 81003 URINALYSIS AUTO W/O SCOPE: CPT | Performed by: PHYSICIAN ASSISTANT

## 2025-03-27 PROCEDURE — 51798 US URINE CAPACITY MEASURE: CPT | Performed by: PHYSICIAN ASSISTANT

## 2025-03-27 PROCEDURE — 99213 OFFICE O/P EST LOW 20 MIN: CPT | Performed by: PHYSICIAN ASSISTANT

## 2025-03-30 NOTE — PROGRESS NOTES
Subjective:   Delfin De Leon is a 69 year old male with hx of anxiety, HTN, HL, DM, GERD, BPH with severe LUTS s/p TURP 12/19/24, who presents today for follow up worsening pain.    Patient notes that over the last month developed UTI like symptoms. Was seen by Dr. Davis and started on Bactrim for suspected prostatitis. Urine culture negative. He remains on Bactrim but denies current improvement.     Patient does have relief with sitz bathes and ibuprofen, but as soon as this wears off pain is back. He resumed working prior to onset of symptoms. He drives 2 hours each direction daily.     Current nocturia x 4. Constant urge to void and pain into the tip of the penis. PVR today 6mL.     History/Other:    Chief Complaint Reviewed and Verified  Nursing Notes Reviewed and   Verified  Tobacco Reviewed  Allergies Reviewed  Social History Reviewed           Current Outpatient Medications   Medication Sig Dispense Refill    tamsulosin 0.4 MG Oral Cap Take 1 capsule (0.4 mg total) by mouth daily.      vitamin B-12 50 MCG Oral Tab Take 1 tablet (50 mcg total) by mouth daily.      sulfamethoxazole-trimethoprim -160 MG Oral Tab per tablet Take 1 tablet by mouth 2 (two) times daily for 28 days. 56 tablet 0    ibuprofen 400 MG Oral Tab Take 1 tablet (400 mg total) by mouth every 6 (six) hours as needed for Pain.      finasteride 5 MG Oral Tab Take 1 tablet (5 mg total) by mouth daily. 90 tablet 6    amLODIPine 5 MG Oral Tab Take 1 tablet (5 mg total) by mouth daily.      ascorbic acid 1000 MG Oral Tab Take 1 tablet (1,000 mg total) by mouth daily.      atorvastatin 40 MG Oral Tab Take 1 tablet (40 mg total) by mouth nightly.      Cholecalciferol 50 MCG (2000 UT) Oral Tab Take 1 tablet (2,000 Units total) by mouth daily.      metFORMIN 500 MG Oral Tab Take 1 tablet (500 mg total) by mouth daily with breakfast.      Multiple Vitamin (DAILY VALUE MULTIVITAMIN) Oral Tab Take 1 tablet by mouth daily.      omeprazole 20  MG Oral Capsule Delayed Release Take 1 capsule (20 mg total) by mouth at bedtime.      pyridoxine 100 MG Oral Tab Take 1 tablet (100 mg total) by mouth daily.      sertraline 100 MG Oral Tab Take 2 tablets (200 mg total) by mouth at bedtime.      zolpidem 10 MG Oral Tab Take 1 tablet (10 mg total) by mouth nightly as needed.         Review of Systems:  Pertinent items are noted in HPI.      Objective:   There were no vitals taken for this visit. Estimated body mass index is 36.46 kg/m² as calculated from the following:    Height as of 3/21/25: 6' 1\" (1.854 m).    Weight as of 3/21/25: 276 lb 6 oz (125.4 kg).    No distress  Non labored respirations    Laboratory Data:  Lab Results   Component Value Date    WBC 4.5 03/22/2025    HGB 13.2 03/22/2025    .0 (L) 03/22/2025     Lab Results   Component Value Date     03/22/2025    K 4.3 03/22/2025     03/22/2025    CO2 24.0 03/22/2025    BUN 14 03/22/2025     (H) 03/22/2025    AST 26 03/22/2025    ALT 33 03/22/2025    TP 7.0 03/22/2025    ALB 4.8 03/22/2025    CA 9.7 03/22/2025       Urinalysis Results (last three years):  Recent Labs     07/18/24  0949 08/16/24  1020 08/27/24  0800 01/08/25  1058 03/07/25  0904 03/27/25  1215   COLORUR  --   --  Light-Yellow  --   --   --    CLARITY  --   --  Clear  --   --   --    SPECGRAVITY 1.020 1.025 1.019 1.015 1.015 1.025   PHURINE 6.0 6.0 5.5 5.5 6.5 5.5   PROUR  --   --  Trace*  --   --   --    GLUUR  --   --  Normal  --   --   --    KETUR  --   --  Negative  --   --   --    BILUR  --   --  Negative  --   --   --    BLOODURINE  --   --  Negative  --   --   --    NITRITE Negative Negative Negative Negative Negative Negative   UROBILINOGEN  --   --  Normal  --   --   --    LEUUR  --   --  250*  --   --   --    WBCUR  --   --  >50*  --   --   --    RBCUR  --   --  3-5*  --   --   --    BACUR  --   --  None Seen  --   --   --        Urine Culture Results (last three years):  Lab Results   Component Value  Date    URINECUL No Growth 2 Days 03/07/2025    URINECUL No Growth 2 Days 08/27/2024       Imaging  No results found.    Assessment & Plan:   Prostatitis, acute on chronic    Likely exacerbated by work travel, recommend minimizing driving in car and when needed to use donut pillow. Letter provided. To complete course of antibiotic as prescribed. Will send urine for PCR testing . Continue tamsulosin and proscar. Consider future PFT.         Brenda Bains PA-C

## 2025-04-01 ENCOUNTER — TELEPHONE (OUTPATIENT)
Dept: SURGERY | Facility: CLINIC | Age: 70
End: 2025-04-01

## 2025-04-01 NOTE — TELEPHONE ENCOUNTER
Future Appointments   Date Time Provider Department Center   4/18/2025 10:45 AM Sam Davis MD [id number]     Coag neg staph    Recommended Augmentin > keflex > cipro > levaquin > macrobid    Spoke with patient, despite adjustments ongoing symptoms.  Will switch from Bactrim to Augmentin. Advised probiotic and follow up as scheduled.

## 2025-04-04 ENCOUNTER — TELEPHONE (OUTPATIENT)
Dept: SURGERY | Facility: CLINIC | Age: 70
End: 2025-04-04

## 2025-04-04 NOTE — TELEPHONE ENCOUNTER
Please call patient and see how he is doing from urinary standpoint. If urinary symptoms are still pretty bad then I'd recommend we change his visit on Friday 4/18 to a cysto appt with PVR to make sure everything is healing appropriately from his procedure. If he thinks things are getting better then we will just have a check-up appt as currently scheduled.    Thanks,  [facility]

## 2025-04-07 NOTE — TELEPHONE ENCOUNTER
Called pt to discuss below.   Pt states the antibiotics have not helped symptoms.  Pt agreeable to cystoscopy on 4/18.   Appt changed.  This encounter is completed.

## 2025-04-08 ENCOUNTER — TELEPHONE (OUTPATIENT)
Dept: SURGERY | Facility: CLINIC | Age: 70
End: 2025-04-08

## 2025-04-08 NOTE — TELEPHONE ENCOUNTER
Per patient has a question about the prescription he is supposed to take for his procedure. Please advise

## 2025-04-08 NOTE — TELEPHONE ENCOUNTER
Spoke with pt and pt is asking for an earlier appt for cysto  An earlier appt was available and appt changed  Pt verbalized understanding     Future Appointments   Date Time Provider Department Center   4/15/2025  8:00 AM Sam Davis MD [id number]     This encounter is now closed

## 2025-04-14 ENCOUNTER — TELEPHONE (OUTPATIENT)
Dept: SURGERY | Facility: CLINIC | Age: 70
End: 2025-04-14

## 2025-04-14 NOTE — TELEPHONE ENCOUNTER
Per patient calling requesting to reschedule procedure for tomorrow (4/15/2025) at 8am with Dr. Davis. Please call back.

## 2025-04-14 NOTE — TELEPHONE ENCOUNTER
Pt says antibiotics have been working and doesn't want a cysto. Instead, just wants a regular follow up.   This encounter is now closed.

## 2025-04-15 ENCOUNTER — OFFICE VISIT (OUTPATIENT)
Dept: SURGERY | Facility: CLINIC | Age: 70
End: 2025-04-15
Payer: COMMERCIAL

## 2025-04-15 DIAGNOSIS — R33.9 URINARY RETENTION: ICD-10-CM

## 2025-04-15 DIAGNOSIS — N32.89 BLADDER MASS: ICD-10-CM

## 2025-04-15 DIAGNOSIS — M62.89 PELVIC FLOOR DYSFUNCTION: ICD-10-CM

## 2025-04-15 DIAGNOSIS — N41.0 ACUTE PROSTATITIS: ICD-10-CM

## 2025-04-15 DIAGNOSIS — R82.90 URINE FINDING: Primary | ICD-10-CM

## 2025-04-15 DIAGNOSIS — N13.8 BPH WITH OBSTRUCTION/LOWER URINARY TRACT SYMPTOMS: ICD-10-CM

## 2025-04-15 DIAGNOSIS — N40.1 BPH WITH OBSTRUCTION/LOWER URINARY TRACT SYMPTOMS: ICD-10-CM

## 2025-04-15 LAB
APPEARANCE: CLEAR
BILIRUBIN: NEGATIVE
GLUCOSE (URINE DIPSTICK): NEGATIVE MG/DL
KETONES (URINE DIPSTICK): NEGATIVE MG/DL
LEUKOCYTES: NEGATIVE
MULTISTIX LOT#: NORMAL NUMERIC
NITRITE, URINE: NEGATIVE
OCCULT BLOOD: NEGATIVE
PH, URINE: 6 (ref 4.5–8)
PROTEIN (URINE DIPSTICK): NEGATIVE MG/DL
SPECIFIC GRAVITY: 1.01 (ref 1–1.03)
URINE-COLOR: YELLOW
UROBILINOGEN,SEMI-QN: 0.2 MG/DL (ref 0–1.9)

## 2025-04-15 PROCEDURE — 51798 US URINE CAPACITY MEASURE: CPT | Performed by: UROLOGY

## 2025-04-15 PROCEDURE — 99214 OFFICE O/P EST MOD 30 MIN: CPT | Performed by: UROLOGY

## 2025-04-15 PROCEDURE — 81002 URINALYSIS NONAUTO W/O SCOPE: CPT | Performed by: UROLOGY

## 2025-04-15 NOTE — PROGRESS NOTES
HPI:     Delfin De Leon is a 69 year old male with a PMH of anxiety, HTN, HL, DM, GERD.    Following for:  1. BPH with severe LUTS  - s/p TURP 12/19/24: 8.2 g with severe inflammation/prostatitis  - flomax 7/18/24, proscar 9/4/24  2. Chronic prostatitis since ~ 2010  - typically clears with abx  3. AMH  - cysto + bladder bx 8/10/23 ([place]) showing acute on chronic cystitis    PCP - Boo [place])  Prior Urologist - Liliam 8/16/24, Brenda 7/18/24, Lacie CULVER    Presents for check-up. Completed abx for UTI in Mar 2025 and feels better.  Urinary symptoms are better following TURP. Went from drinking no water to ~ 100 mL per day with light yellow urine. He is taking reta/pro.    Hip/back pain: L hip pain which has improved.  Diarrhea/constipation: none  Sits ~ 8-10 h per day.  Exercise: none  Snoring: yes - has CPAP but not using and hasn't been evaluated in > 10 y. Would like to repeat.    AUA SS is 14/35 with 3 n; 2 u, I, MARCIANO. Was 25/35 prior to TURP with 5 n, s, w, f, MARCIANO - was 29/35 prior to flomax. Mostly happy with LUTS.  Incontinence: none    UA is s/f UTI and PVR is 51 mL - was 84, 69, 101 mL    UTI hx: none  Gross hematuria: none  Tobacco hx: none  Kidney stone hx: none  Fam h/o  malignancy: none    Poor potency and prefers observation    PSA 1.08 7/12/24    Reported no water, > 32 soda with medium yellow urine. Now ~ 100 with light yellow urine.    CTU 8/13/24: focal BWT (right lateral)  Cysto today: mod BPH with mod ADONIS. ~ 2 cm papillary erythematous lesion at the bladder dome    We discussed options and he wants to start 4 weeks abx for presumed prostatitis (reports he has required 4-8 weeks abx in the past)    I encouraged the pt drink at least 40-60 oz water per day or enough to keep urine clear to pale yellow for UTI prevention.    We also discussed PFT as an option for pelvic floor issues and he wants to hold off as he is doing well.     He will increase water intake and cut back on  dietary irritants to help with OAB symptoms. Resume flomax and continue proscar for BPH/LUTS. PFT ordered for pelvic pain but pt declines right now as he is doing well. Repeat sleep study ordered. F/u in 6 mo for check-up with PVR.    HISTORY:  Past Medical History:    Arthritis    Cataract    early stages    Coronary atherosclerosis    Depression    Diabetes (HCC)    Diverticulosis of large intestine    Esophageal reflux    Essential hypertension    Hearing impaired person, bilateral    bilateral hearing aids    High blood pressure    High cholesterol    Hx of motion sickness    Hyperlipidemia    Migraines    in the past    PONV (postoperative nausea and vomiting)    Sleep apnea    NO TX      Past Surgical History:   Procedure Laterality Date    Angioplasty (coronary)  2019    Arthroscopy of joint unlisted      knee x2    Cath bare metal stent (bms)      Colonoscopy  2022    Hernia surgery      Hip replacement surgery  2022    Other surgical history      elbow wound    Other surgical history      vocal cord    Tonsillectomy  1961    Total hip replacement      Vasectomy  2001      Family History   Problem Relation Age of Onset    Hypertension Father     Heart Disorder Father         Valve failure    Hypertension Brother     Cancer Brother         Multiple Myeloma    Heart Disorder Mother         Valve failure      Social History:   Social History     Socioeconomic History    Marital status:    Tobacco Use    Smoking status: Never    Smokeless tobacco: Never   Vaping Use    Vaping status: Never Used   Substance and Sexual Activity    Alcohol use: Yes     Alcohol/week: 1.0 standard drink of alcohol     Types: 1 Cans of beer per week     Comment: NAbout 1 beer a month    Drug use: Never   Other Topics Concern    Caffeine Concern No    Exercise No    Seat Belt No    Special Diet No    Stress Concern No    Weight Concern Yes     Social Drivers of Health     Food Insecurity: No Food Insecurity (12/19/2024)    Food  Insecurity     Food Insecurity: Never true   Transportation Needs: No Transportation Needs (12/19/2024)    Transportation Needs     Lack of Transportation: No   Housing Stability: Low Risk  (12/19/2024)    Housing Stability     Housing Instability: No        Medications (Active prior to today's visit):  Current Outpatient Medications   Medication Sig Dispense Refill    amoxicillin clavulanate 875-125 MG Oral Tab Take 1 tablet by mouth 2 (two) times daily for 28 days. 56 tablet 0    tamsulosin 0.4 MG Oral Cap Take 1 capsule (0.4 mg total) by mouth daily.      vitamin B-12 50 MCG Oral Tab Take 1 tablet (50 mcg total) by mouth daily.      ibuprofen 400 MG Oral Tab Take 1 tablet (400 mg total) by mouth every 6 (six) hours as needed for Pain.      finasteride 5 MG Oral Tab Take 1 tablet (5 mg total) by mouth daily. 90 tablet 6    amLODIPine 5 MG Oral Tab Take 1 tablet (5 mg total) by mouth daily.      ascorbic acid 1000 MG Oral Tab Take 1 tablet (1,000 mg total) by mouth daily.      atorvastatin 40 MG Oral Tab Take 1 tablet (40 mg total) by mouth nightly.      Cholecalciferol 50 MCG (2000 UT) Oral Tab Take 1 tablet (2,000 Units total) by mouth daily.      metFORMIN 500 MG Oral Tab Take 1 tablet (500 mg total) by mouth daily with breakfast.      Multiple Vitamin (DAILY VALUE MULTIVITAMIN) Oral Tab Take 1 tablet by mouth daily.      omeprazole 20 MG Oral Capsule Delayed Release Take 1 capsule (20 mg total) by mouth at bedtime.      pyridoxine 100 MG Oral Tab Take 1 tablet (100 mg total) by mouth daily.      sertraline 100 MG Oral Tab Take 2 tablets (200 mg total) by mouth at bedtime.      zolpidem 10 MG Oral Tab Take 1 tablet (10 mg total) by mouth nightly as needed.         Allergies:  No Known Allergies      ROS:     A comprehensive 10 point review of systems was completed.  Pertinent positives and negatives noted in the the HPI.    PHYSICAL EXAM:     GENERAL APPEARANCE: well, developed, well nourished, in no acute  distress  NEUROLOGIC: nonfocal, alert and oriented  HEAD: normocephalic, atraumatic  EYES: sclera non-icteric  EARS: hearing intact  ORAL CAVITY: mucosa moist  NECK/THYROID: no obvious goiter or masses  LUNGS: nonlabored breathing  ABDOMEN: soft, no obvious masses or tenderness  SKIN: no obvious rashes    : as noted above    ASSESSMENT/PLAN:   Diagnoses and all orders for this visit:    Urine finding  -     URINALYSIS NONAUTO W/O SCOPE    Urinary retention    Acute prostatitis    BPH with obstruction/lower urinary tract symptoms    Bladder mass    Pelvic floor dysfunction    - as noted above.    Thanks again for this consult.    Sam Davis MD, FACS  Urologist  [facility]  Office: [phone]

## 2025-05-25 ENCOUNTER — HOSPITAL ENCOUNTER (EMERGENCY)
Facility: HOSPITAL | Age: 70
Discharge: HOME | End: 2025-05-25
Attending: EMERGENCY MEDICINE
Payer: COMMERCIAL

## 2025-05-25 VITALS
DIASTOLIC BLOOD PRESSURE: 90 MMHG | RESPIRATION RATE: 19 BRPM | HEART RATE: 52 BPM | OXYGEN SATURATION: 100 % | SYSTOLIC BLOOD PRESSURE: 146 MMHG | TEMPERATURE: 98 F

## 2025-05-25 DIAGNOSIS — N30.90 CYSTITIS: ICD-10-CM

## 2025-05-25 DIAGNOSIS — R30.0 DYSURIA: Primary | ICD-10-CM

## 2025-05-25 LAB
BILIRUB UR QL STRIP.AUTO: NEGATIVE
CLARITY UR REFRACT.AUTO: CLEAR
GLUCOSE UR STRIP.AUTO-MCNC: NORMAL MG/DL
KETONES UR STRIP.AUTO-MCNC: NEGATIVE MG/DL
LEUKOCYTE ESTERASE UR QL STRIP.AUTO: 25
NITRITE UR QL STRIP.AUTO: NEGATIVE
PH UR STRIP.AUTO: 6 [PH] (ref 5–8)
PROT UR STRIP.AUTO-MCNC: NEGATIVE MG/DL
SP GR UR STRIP.AUTO: 1.01 (ref 1–1.03)
UROBILINOGEN UR STRIP.AUTO-MCNC: NORMAL MG/DL

## 2025-05-25 PROCEDURE — 99284 EMERGENCY DEPT VISIT MOD MDM: CPT

## 2025-05-25 PROCEDURE — 99283 EMERGENCY DEPT VISIT LOW MDM: CPT

## 2025-05-25 PROCEDURE — 87086 URINE CULTURE/COLONY COUNT: CPT | Performed by: EMERGENCY MEDICINE

## 2025-05-25 PROCEDURE — 81001 URINALYSIS AUTO W/SCOPE: CPT | Performed by: EMERGENCY MEDICINE

## 2025-05-25 RX ORDER — PHENAZOPYRIDINE HYDROCHLORIDE 100 MG/1
100 TABLET, FILM COATED ORAL 3 TIMES DAILY PRN
Qty: 6 TABLET | Refills: 0 | Status: SHIPPED | OUTPATIENT
Start: 2025-05-25 | End: 2025-06-01

## 2025-05-25 RX ORDER — LEVOFLOXACIN 250 MG/1
250 TABLET, FILM COATED ORAL DAILY
Qty: 10 TABLET | Refills: 0 | Status: SHIPPED | OUTPATIENT
Start: 2025-05-25 | End: 2025-06-04

## 2025-05-25 RX ORDER — SULFAMETHOXAZOLE AND TRIMETHOPRIM 800; 160 MG/1; MG/1
1 TABLET ORAL 2 TIMES DAILY
Qty: 20 TABLET | Refills: 0 | Status: SHIPPED | OUTPATIENT
Start: 2025-05-25 | End: 2025-05-25

## 2025-05-25 NOTE — ED INITIAL ASSESSMENT (HPI)
Pt here with c/o urinary retention for past 4 months. Pt states this has been harder last couple of days with intense pain/burning sensation.

## 2025-05-25 NOTE — ED PROVIDER NOTES
Patient Seen in: [facility] Emergency Department      History     Chief Complaint   Patient presents with    Urinary Symptoms     Stated Complaint: dysuria, urinary retention    Subjective:     HPI    69-year-old male with diabetes (metformin) (amlodipine and hypertension) with history of prostatitis, BPH and TURP procedure in December 2024 by Dr. Davis. Over the past week, the patient has experienced an intensification of a stinging sensation at the end of the penis. He has also had increased urinary frequency with minimal output. Approximately two and a half years ago, the patient underwent cystoscopy/bladder biopsy in [place] that showed chronic cystitis.    Objective:   Past Medical History:    Arthritis    Cataract    early stages    Coronary atherosclerosis    Depression    Diabetes (HCC)    Diverticulosis of large intestine    Esophageal reflux    Essential hypertension    Hearing impaired person, bilateral    bilateral hearing aids    High blood pressure    High cholesterol    Hx of motion sickness    Hyperlipidemia    Migraines    in the past    PONV (postoperative nausea and vomiting)    Sleep apnea    NO TX              Past Surgical History:   Procedure Laterality Date    Angioplasty (coronary)  2019    Arthroscopy of joint unlisted      knee x2    Cath bare metal stent (bms)      Colonoscopy  2022    Hernia surgery      Hip replacement surgery  2022    Other surgical history      elbow wound    Other surgical history      vocal cord    Tonsillectomy  1961    Total hip replacement      Vasectomy  2001                No pertinent social history.            Review of Systems    Positive for stated complaint: dysuria, urinary retention  Other systems are as noted in HPI.  Constitutional and vital signs reviewed.      All other systems reviewed and negative except as noted above.    Physical Exam     ED Triage Vitals [05/25/25 1115]   BP (!) 165/84   Pulse 60   Resp 19   Temp 98 °F (36.7 °C)   Temp  src Temporal   SpO2 96 %   O2 Device None (Room air)       Current:/90   Pulse 52   Temp 98 °F (36.7 °C) (Temporal)   Resp 19   SpO2 100%     General:  Vitals as listed.  No acute distress   HEENT: Sclerae anicteric.  Conjunctivae show no pallor.  Oropharynx clear, mucous membranes moist   Lungs: good air exchange  Abdomen: Soft and nontender.  No abdominal masses.  No peritoneal signs   Extremities: no edema, normal peripheral pulses   Neuro: Alert oriented and nonfocal      ED Course     Labs Reviewed   URINALYSIS WITH CULTURE REFLEX - Abnormal; Notable for the following components:       Result Value    Blood Urine 1+ (*)     Leukocyte Esterase Urine 25 (*)     WBC Urine 6-10 (*)     RBC Urine 3-5 (*)     Squamous Epi. Cells Few (*)     All other components within normal limits   URINE CULTURE, ROUTINE       ED COURSE and MDM     Sources of the medical history included the patient and his wife    I reviewed prior external notes including primary care evaluation on 3/7/2025 with no acute medical problems.    Patient seen by Dr. Davis, urology, on 3/7/2025 for BPH and lower urinary tract symptoms that may be related to urine storage/voiding (LUTS).    Bladder scan showed 250 cc urine.  Patient able to urinate afterward.    Prescribed Levaquin for cystitis    I have discussed with the patient the results of testing, differential diagnosis, and treatment plan. They expressed clear understanding of these instructions and agrees to the plan provided.    Disposition and Plan     Clinical Impression:  1. Dysuria    2. Cystitis         Disposition:  Discharge  5/25/2025  1:26 pm    Follow-up:  Sam Davis MD  [address] [place] [address] [place] [id number]  [phone]    Follow up          Medications Prescribed:  Discharge Medication List as of 5/25/2025  1:27 PM        START taking these medications    Details   phenazopyridine 100 MG Oral Tab Take 1 tablet (100 mg total) by mouth 3 (three) times daily as  needed for Pain., Normal, Disp-6 tablet, R-0      levoFLOXacin 250 MG Oral Tab Take 1 tablet (250 mg total) by mouth daily for 10 days., Normal, Disp-10 tablet, R-0

## 2025-05-28 ENCOUNTER — TELEPHONE (OUTPATIENT)
Dept: SURGERY | Facility: CLINIC | Age: 70
End: 2025-05-28

## 2025-05-28 NOTE — TELEPHONE ENCOUNTER
Patient was scheduled and was a no show on 5/28/2025 and patient rescheduled appointment.   This encounter is now closed.

## 2025-05-29 ENCOUNTER — OFFICE VISIT (OUTPATIENT)
Dept: SURGERY | Facility: CLINIC | Age: 70
End: 2025-05-29
Payer: COMMERCIAL

## 2025-05-29 DIAGNOSIS — R82.90 URINE FINDING: ICD-10-CM

## 2025-05-29 DIAGNOSIS — R33.9 INCOMPLETE EMPTYING OF BLADDER: ICD-10-CM

## 2025-05-29 DIAGNOSIS — N40.1 BPH WITH OBSTRUCTION/LOWER URINARY TRACT SYMPTOMS: ICD-10-CM

## 2025-05-29 DIAGNOSIS — N41.1 CHRONIC PROSTATITIS: Primary | ICD-10-CM

## 2025-05-29 DIAGNOSIS — M62.89 PELVIC FLOOR DYSFUNCTION: ICD-10-CM

## 2025-05-29 DIAGNOSIS — M62.89 PELVIC FLOOR TENSION: ICD-10-CM

## 2025-05-29 DIAGNOSIS — N13.8 BPH WITH OBSTRUCTION/LOWER URINARY TRACT SYMPTOMS: ICD-10-CM

## 2025-05-29 LAB
APPEARANCE: CLEAR
BILIRUBIN: NEGATIVE
GLUCOSE (URINE DIPSTICK): 100 MG/DL
KETONES (URINE DIPSTICK): NEGATIVE MG/DL
MULTISTIX LOT#: ABNORMAL NUMERIC
NITRITE, URINE: POSITIVE
PH, URINE: 5.5 (ref 4.5–8)
PROTEIN (URINE DIPSTICK): 30 MG/DL
SPECIFIC GRAVITY: 1.02 (ref 1–1.03)
URINE-COLOR: YELLOW
UROBILINOGEN,SEMI-QN: 0.2 MG/DL (ref 0–1.9)

## 2025-05-29 PROCEDURE — 51798 US URINE CAPACITY MEASURE: CPT | Performed by: PHYSICIAN ASSISTANT

## 2025-05-29 PROCEDURE — 81003 URINALYSIS AUTO W/O SCOPE: CPT | Performed by: PHYSICIAN ASSISTANT

## 2025-05-29 PROCEDURE — 99214 OFFICE O/P EST MOD 30 MIN: CPT | Performed by: PHYSICIAN ASSISTANT

## 2025-05-29 NOTE — PROGRESS NOTES
Subjective:   Delfin De Leon is a 69 year old male with hx of anxiety, HTN, HL, DM, GERD, BPH s/p TURP 12/19/24, who presents for follow up LUTS.    Patient last seen in April 2025 by Dr. Davis for 4 month post op check and was doing well. PVR at that time 51mL. Symptoms seemed better for period of time and again in the last few weeks has developed again dysuria, urinary frequency and weak urine stream. He remains on tamsulosin and finasteride. His PVR today 196mL. He is currently on Levaquin which was prescribed at ED visit 4 days ago. He has 6 doses of abx left in prescription. No significant improvement of symptoms.     He had been seen by myself in March for worsening LUTS. 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 PCR testing coag neg staph. He was treated with Augmentin.      History/Other:    Chief Complaint Reviewed and Verified  Nursing Notes Reviewed and   Verified  Allergies Reviewed  Medications Reviewed         Current Medications[1]    Review of Systems:  Pertinent items are noted in HPI.      Objective:   There were no vitals taken for this visit. Estimated body mass index is 36.46 kg/m² as calculated from the following:    Height as of 3/21/25: 6' 1\" (1.854 m).    Weight as of 3/21/25: 276 lb 6 oz (125.4 kg).    No distress  Non labored respirations    Laboratory Data:  Lab Results   Component Value Date    WBC 4.5 03/22/2025    HGB 13.2 03/22/2025    .0 (L) 03/22/2025     Lab Results   Component Value Date     03/22/2025    K 4.3 03/22/2025     03/22/2025    CO2 24.0 03/22/2025    BUN 14 03/22/2025     (H) 03/22/2025    AST 26 03/22/2025    ALT 33 03/22/2025    TP 7.0 03/22/2025    ALB 4.8 03/22/2025    CA 9.7 03/22/2025       Urinalysis Results (last three years):  Recent Labs     07/18/24  0949 08/16/24  1020 08/27/24  0800  01/08/25  1058 03/07/25  0904 03/27/25  1215 04/15/25  0813 05/25/25  1148   COLORUR  --   --  Light-Yellow  --   --   --   --  Light-Yellow   CLARITY  --   --  Clear  --   --   --   --  Clear   SPECGRAVITY 1.020 1.025 1.019 1.015 1.015 1.025 1.015 1.014   PHURINE 6.0 6.0 5.5 5.5 6.5 5.5 6.0 6.0   PROUR  --   --  Trace*  --   --   --   --  Negative   GLUUR  --   --  Normal  --   --   --   --  Normal   KETUR  --   --  Negative  --   --   --   --  Negative   BILUR  --   --  Negative  --   --   --   --  Negative   BLOODURINE  --   --  Negative  --   --   --   --  1+*   NITRITE Negative Negative Negative Negative Negative Negative Negative Negative   UROBILINOGEN  --   --  Normal  --   --   --   --  Normal   LEUUR  --   --  250*  --   --   --   --  25*   WBCUR  --   --  >50*  --   --   --   --  6-10*   RBCUR  --   --  3-5*  --   --   --   --  3-5*   BACUR  --   --  None Seen  --   --   --   --  None Seen       Urine Culture Results (last three years):  Lab Results   Component Value Date    URINECUL No Growth at 18-24 hrs. 05/25/2025    URINECUL No Growth 2 Days 03/07/2025    URINECUL No Growth 2 Days 08/27/2024       Imaging  No results found.    Assessment & Plan:   Chronic prostatitis  Pelvic floor tension/dysfunction  Incomplete emptying of bladder  BPH with LUTS s/p TURP    Reviewed with patient potential contributing factors  Unfortunately his current work requires a 2 hour commute, which I believe are likely contributing to his current condition, and again we discussed considerations including donut pillow and working from home. At this time I would strongly encourage this limitation.   We also discussed pelvic floor tension/dysfunction and benefit of PFT, he is willing to consider this option. Referral placed and handout for pelvic floor stretches provided  Recommend he continue tamsulosin and finasteride  Given elevated residual today, would consider cystoscopy if symptoms are not improving.        Brenda L.  CLAUDINE Bains, 5/29/2025         [1]   Current Outpatient Medications   Medication Sig Dispense Refill    phenazopyridine 100 MG Oral Tab Take 1 tablet (100 mg total) by mouth 3 (three) times daily as needed for Pain. 6 tablet 0    levoFLOXacin 250 MG Oral Tab Take 1 tablet (250 mg total) by mouth daily for 10 days. 10 tablet 0    tamsulosin 0.4 MG Oral Cap Take 1 capsule (0.4 mg total) by mouth daily.      vitamin B-12 50 MCG Oral Tab Take 1 tablet (50 mcg total) by mouth daily.      ibuprofen 400 MG Oral Tab Take 1 tablet (400 mg total) by mouth every 6 (six) hours as needed for Pain.      finasteride 5 MG Oral Tab Take 1 tablet (5 mg total) by mouth daily. 90 tablet 6    amLODIPine 5 MG Oral Tab Take 1 tablet (5 mg total) by mouth daily.      ascorbic acid 1000 MG Oral Tab Take 1 tablet (1,000 mg total) by mouth daily.      atorvastatin 40 MG Oral Tab Take 1 tablet (40 mg total) by mouth nightly.      Cholecalciferol 50 MCG (2000 UT) Oral Tab Take 1 tablet (2,000 Units total) by mouth daily.      metFORMIN 500 MG Oral Tab Take 1 tablet (500 mg total) by mouth daily with breakfast.      Multiple Vitamin (DAILY VALUE MULTIVITAMIN) Oral Tab Take 1 tablet by mouth daily.      omeprazole 20 MG Oral Capsule Delayed Release Take 1 capsule (20 mg total) by mouth at bedtime.      pyridoxine 100 MG Oral Tab Take 1 tablet (100 mg total) by mouth daily.      sertraline 100 MG Oral Tab Take 2 tablets (200 mg total) by mouth at bedtime.      zolpidem 10 MG Oral Tab Take 1 tablet (10 mg total) by mouth nightly as needed.

## 2025-06-18 ENCOUNTER — TELEPHONE (OUTPATIENT)
Dept: SURGERY | Facility: CLINIC | Age: 70
End: 2025-06-18

## 2025-06-18 NOTE — TELEPHONE ENCOUNTER
Per patient has horrible urge to urinate, patient is having pain, requesting to speak to RN. Please call thank you.

## 2025-06-18 NOTE — TELEPHONE ENCOUNTER
RN phoned Patient to discuss below.  Last Office visit 5/29  Patient reports urinary urgency/frequency with pain after voiding. Denies fever/chills or dysuria.  Patient denies having started Pelvic floor exercise recommendation given in last office visit. Rn instructed Patient to go to Er or [facility] for urinary urgency/frequency with pain.  Patient agreeable with plan.  This Encounter is now closed.

## 2025-06-20 ENCOUNTER — APPOINTMENT (OUTPATIENT)
Dept: CT IMAGING | Age: 70
End: 2025-06-20
Attending: EMERGENCY MEDICINE
Payer: COMMERCIAL

## 2025-06-20 ENCOUNTER — HOSPITAL ENCOUNTER (EMERGENCY)
Age: 70
Discharge: HOME | End: 2025-06-20
Attending: EMERGENCY MEDICINE
Payer: COMMERCIAL

## 2025-06-20 VITALS
HEIGHT: 73 IN | DIASTOLIC BLOOD PRESSURE: 91 MMHG | OXYGEN SATURATION: 99 % | RESPIRATION RATE: 17 BRPM | BODY MASS INDEX: 36.45 KG/M2 | SYSTOLIC BLOOD PRESSURE: 155 MMHG | WEIGHT: 275 LBS | HEART RATE: 73 BPM | TEMPERATURE: 98 F

## 2025-06-20 DIAGNOSIS — R35.0 URINARY FREQUENCY: ICD-10-CM

## 2025-06-20 DIAGNOSIS — R39.89 BLADDER PAIN: Primary | ICD-10-CM

## 2025-06-20 LAB
ALBUMIN SERPL-MCNC: 4.4 G/DL (ref 3.2–4.8)
ALBUMIN/GLOB SERPL: 2.1 {RATIO} (ref 1–2)
ALP LIVER SERPL-CCNC: 75 U/L (ref 45–117)
ALT SERPL-CCNC: 36 U/L (ref 10–49)
ANION GAP SERPL CALC-SCNC: 6 MMOL/L (ref 0–18)
AST SERPL-CCNC: 28 U/L (ref ?–34)
BASOPHILS # BLD AUTO: 0.04 X10(3) UL (ref 0–0.2)
BASOPHILS NFR BLD AUTO: 0.8 %
BILIRUB SERPL-MCNC: 0.6 MG/DL (ref 0.2–1.1)
BILIRUB UR QL STRIP.AUTO: NEGATIVE
BUN BLD-MCNC: 14 MG/DL (ref 9–23)
CALCIUM BLD-MCNC: 9 MG/DL (ref 8.7–10.6)
CHLORIDE SERPL-SCNC: 102 MMOL/L (ref 98–112)
CLARITY UR REFRACT.AUTO: CLEAR
CO2 SERPL-SCNC: 28 MMOL/L (ref 21–32)
COLOR UR AUTO: YELLOW
CREAT BLD-MCNC: 1.1 MG/DL (ref 0.7–1.3)
EGFRCR SERPLBLD CKD-EPI 2021: 73 ML/MIN/1.73M2 (ref 60–?)
EOSINOPHIL # BLD AUTO: 0.09 X10(3) UL (ref 0–0.7)
EOSINOPHIL NFR BLD AUTO: 1.8 %
ERYTHROCYTE [DISTWIDTH] IN BLOOD BY AUTOMATED COUNT: 14 %
GLOBULIN PLAS-MCNC: 2.1 G/DL (ref 2–3.5)
GLUCOSE BLD-MCNC: 288 MG/DL (ref 70–99)
GLUCOSE UR STRIP.AUTO-MCNC: 100 MG/DL
HCT VFR BLD AUTO: 36.9 % (ref 39–53)
HGB BLD-MCNC: 13.3 G/DL (ref 13–17.5)
IMM GRANULOCYTES # BLD AUTO: 0.01 X10(3) UL (ref 0–1)
IMM GRANULOCYTES NFR BLD: 0.2 %
KETONES UR STRIP.AUTO-MCNC: NEGATIVE MG/DL
LYMPHOCYTES # BLD AUTO: 1.36 X10(3) UL (ref 1–4)
LYMPHOCYTES NFR BLD AUTO: 27.3 %
MCH RBC QN AUTO: 30.2 PG (ref 26–34)
MCHC RBC AUTO-ENTMCNC: 36 G/DL (ref 31–37)
MCV RBC AUTO: 83.7 FL (ref 80–100)
MONOCYTES # BLD AUTO: 0.42 X10(3) UL (ref 0.1–1)
MONOCYTES NFR BLD AUTO: 8.4 %
NEUTROPHILS # BLD AUTO: 3.07 X10 (3) UL (ref 1.5–7.7)
NEUTROPHILS # BLD AUTO: 3.07 X10(3) UL (ref 1.5–7.7)
NEUTROPHILS NFR BLD AUTO: 61.5 %
NITRITE UR QL STRIP.AUTO: NEGATIVE
OSMOLALITY SERPL CALC.SUM OF ELEC: 293 MOSM/KG (ref 275–295)
PH UR STRIP.AUTO: 5 [PH] (ref 5–8)
PLATELET # BLD AUTO: 130 10(3)UL (ref 150–450)
POTASSIUM SERPL-SCNC: 3.7 MMOL/L (ref 3.5–5.1)
PROT SERPL-MCNC: 6.5 G/DL (ref 5.7–8.2)
PROT UR STRIP.AUTO-MCNC: NEGATIVE MG/DL
RBC # BLD AUTO: 4.41 X10(6)UL (ref 3.8–5.8)
RBC #/AREA URNS AUTO: >10 /HPF
SODIUM SERPL-SCNC: 136 MMOL/L (ref 136–145)
SP GR UR STRIP.AUTO: <=1.005 (ref 1–1.03)
UROBILINOGEN UR STRIP.AUTO-MCNC: 0.2 MG/DL
WBC # BLD AUTO: 5 X10(3) UL (ref 4–11)

## 2025-06-20 PROCEDURE — 87086 URINE CULTURE/COLONY COUNT: CPT

## 2025-06-20 PROCEDURE — 99284 EMERGENCY DEPT VISIT MOD MDM: CPT

## 2025-06-20 PROCEDURE — 81001 URINALYSIS AUTO W/SCOPE: CPT | Performed by: EMERGENCY MEDICINE

## 2025-06-20 PROCEDURE — 80053 COMPREHEN METABOLIC PANEL: CPT | Performed by: EMERGENCY MEDICINE

## 2025-06-20 PROCEDURE — 81001 URINALYSIS AUTO W/SCOPE: CPT

## 2025-06-20 PROCEDURE — 87086 URINE CULTURE/COLONY COUNT: CPT | Performed by: EMERGENCY MEDICINE

## 2025-06-20 PROCEDURE — 81015 MICROSCOPIC EXAM OF URINE: CPT

## 2025-06-20 PROCEDURE — 36415 COLL VENOUS BLD VENIPUNCTURE: CPT

## 2025-06-20 PROCEDURE — 85025 COMPLETE CBC W/AUTO DIFF WBC: CPT | Performed by: EMERGENCY MEDICINE

## 2025-06-20 PROCEDURE — 74177 CT ABD & PELVIS W/CONTRAST: CPT | Performed by: EMERGENCY MEDICINE

## 2025-06-20 RX ORDER — HYDROCODONE BITARTRATE AND ACETAMINOPHEN 5; 325 MG/1; MG/1
1 TABLET ORAL ONCE
Refills: 0 | Status: COMPLETED | OUTPATIENT
Start: 2025-06-20 | End: 2025-06-20

## 2025-06-20 RX ORDER — HYDROCODONE BITARTRATE AND ACETAMINOPHEN 5; 325 MG/1; MG/1
1 TABLET ORAL EVERY 6 HOURS PRN
Qty: 8 TABLET | Refills: 0 | Status: SHIPPED | OUTPATIENT
Start: 2025-06-20

## 2025-06-21 NOTE — ED INITIAL ASSESSMENT (HPI)
Pt to ED with urinary urgency, frequency, pelvic pressure intermittently, fatigue for a few months. Pt also reports loose stool for 1.5 months. Treated for UTI in May. Pt states issues have been ongoing since TURP in Dec.

## 2025-06-21 NOTE — ED PROVIDER NOTES
Patient Seen in: [facility] In [place]        History  Chief Complaint   Patient presents with    Urinary Symptoms     Stated Complaint: uti    Subjective:   HPI            This is a 69-year-old male past medical history of obesity, diabetes, hypertension, high cholesterol, status post TURP 12/19/2024 by Dr. Davis who presents with urinary urgency, frequency.  Patient was seen in the ER on 5/25/2025 and placed on antibiotics for possible UTI urine culture was negative.  He followed up in the office with urology on 5/29/2025.  They recommended pelvic floor therapy, donut pillow, continuing Flomax and finasteride and if symptoms do not improve possible cystoscopy.  Patient states they have not improved and he feels like he has to void all the time.  He states he is a burning/station in the floor of his abdomen and his penis.  He denies fever.  No nausea or vomiting.  No back pain.  He states he called the office but he was told to come to the emergency room.  He presents here for further evaluation.      Objective:     Past Medical History:    Arthritis    Cataract    early stages    Coronary atherosclerosis    Depression    Diabetes (HCC)    Diverticulosis of large intestine    Esophageal reflux    Essential hypertension    Hearing impaired person, bilateral    bilateral hearing aids    High blood pressure    High cholesterol    Hx of motion sickness    Hyperlipidemia    Migraines    in the past    PONV (postoperative nausea and vomiting)    Sleep apnea    NO TX              Past Surgical History:   Procedure Laterality Date    Angioplasty (coronary)  2019    Arthroscopy of joint unlisted      knee x2    Cath bare metal stent (bms)      Colonoscopy  2022    Hernia surgery      Hip replacement surgery  2022    Other surgical history      elbow wound    Other surgical history      vocal cord    Tonsillectomy  1961    Total hip replacement      Vasectomy  2001                Social History      Socioeconomic History    Marital status:    Tobacco Use    Smoking status: Never    Smokeless tobacco: Never   Vaping Use    Vaping status: Never Used   Substance and Sexual Activity    Alcohol use: Yes     Alcohol/week: 1.0 standard drink of alcohol     Types: 1 Cans of beer per week     Comment: NAbout 1 beer a month    Drug use: Never   Other Topics Concern    Caffeine Concern No    Exercise No    Seat Belt No    Special Diet No    Stress Concern No    Weight Concern Yes     Social Drivers of Health     Food Insecurity: No Food Insecurity (12/19/2024)    Food Insecurity     Food Insecurity: Never true   Transportation Needs: No Transportation Needs (12/19/2024)    Transportation Needs     Lack of Transportation: No   Housing Stability: Low Risk  (12/19/2024)    Housing Stability     Housing Instability: No                                Physical Exam    ED Triage Vitals [06/20/25 2039]   /88   Pulse 92   Resp 19   Temp 97.9 °F (36.6 °C)   Temp src Temporal   SpO2 97 %   O2 Device None (Room air)       Current Vitals:   Vital Signs  BP: 152/75  Pulse: 65  Resp: 15  Temp: 97.9 °F (36.6 °C)  Temp src: Temporal    Oxygen Therapy  SpO2: 98 %  O2 Device: None (Room air)            Physical Exam  GENERAL: Awake, alert oriented x3, nontoxic appearing.   SKIN: Normal, warm, and dry.  HEENT:  Pupils equally round and reactive to light. Conjuctiva clear.  Oropharynx is clear and moist.   Lungs: Clear to auscultation bilaterally with no rales, no retractions, and no wheezing.  HEART:  Regular rate and rhythm. S1 and S2. No murmurs, no rubs or gallops.   ABDOMEN: Soft, nontender and nondistended. Normoactive bowel sounds. No rebound. No guarding.   Back: No CVA tenderness  EXTREMITIES: Warm with brisk capillary refill.             ED Course  Labs Reviewed   URINALYSIS WITH CULTURE REFLEX - Abnormal; Notable for the following components:       Result Value    Glucose Urine 100 (*)     Blood Urine Large (*)      Leukocyte Esterase Urine Trace (*)     All other components within normal limits   UA MICROSCOPIC ONLY, URINE - Abnormal; Notable for the following components:    WBC Urine 6-10 (*)     RBC Urine >10 (*)     Bacteria Urine Rare (*)     Squamous Epi. Cells Few (*)     All other components within normal limits   CBC WITH DIFFERENTIAL WITH PLATELET - Abnormal; Notable for the following components:    HCT 36.9 (*)     .0 (*)     All other components within normal limits   COMP METABOLIC PANEL (14) - Abnormal; Notable for the following components:    Glucose 288 (*)     A/G Ratio 2.1 (*)     All other components within normal limits   URINE CULTURE, ROUTINE                     CT ABDOMEN+PELVIS(CONTRAST ONLY)(CPT=74177)  Result Date: 6/20/2025  PROCEDURE:  CT ABDOMEN+PELVIS (CONTRAST ONLY) (CPT=74177)  COMPARISON:  [facility], CT, CT UROGRAM(W+WO) W/3D (CPT=74178/64648), 8/13/2024, 4:47 PM.  INDICATIONS:  uti  TECHNIQUE:  CT scanning was performed from the dome of the diaphragm to the pubic symphysis with non-ionic intravenous contrast material. Post contrast coronal MPR imaging was performed.  Dose reduction techniques were used. Dose information is transmitted to the ACR (American College of Radiology) NRDR (National Radiology Data Registry) which includes the Dose Index Registry.  PATIENT STATED HISTORY:(As transcribed by Technologist)  Patient has urinary urgency, frequency, pelvic pressure intermittently,and fatigue for a few months. Patient also reports loose stool for 1.5 months. Patient was treated for UTI in 05-25. Patient states issues have been ongoing since 12/24.   CONTRAST USED:  100cc of Isovue 370  FINDINGS:  LIVER:  No enlargement, atrophy, abnormal density, or significant focal lesion.  BILIARY:  No visible dilatation or calcification.  PANCREAS:  No lesion, fluid collection, ductal dilatation, or atrophy.  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  KIDNEYS:  No mass, obstruction, or calcification.  ADRENALS:  No mass or enlargement.  AORTA/VASCULAR:  There is mild atherosclerotic disease of the aorta and its branches.  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 RETROPERITONEUM:  No mass or adenopathy.  BOWEL/MESENTERY:  Diverticulosis of the sigmoid colon and descending colon without evidence of diverticulitis.  The appendix is unremarkable.  The visualized small bowel is within normal limits.  There is no abscess or free air. ABDOMINAL WALL:  No mass or hernia.  URINARY BLADDER:  There is asymmetric wall thickening involving the right anterior aspect of the urinary bladder with wall thickness being 9 mm.  There is some stranding of the fat adjacent to the anterior right urinary bladder in the area of wall thickening.  Finding may be due to acute focal cystitis such as infection although neoplastic disease cannot be completely excluded.  Evaluation with cystoscopy could help evaluate. PELVIC NODES:  No adenopathy.  PELVIC ORGANS:  No visible mass.  Pelvic organs appropriate for patient age.  BONES:  No bony lesion or fracture.  Moderate multilevel degenerative changes of the lower thoracic and lumbar spine which is most pronounced at L3-4.  There is multilevel facet joint degenerative changes of the lower lumbar spine. LUNG BASES:  No visible pulmonary or pleural disease.             CONCLUSION:   1. Asymmetric wall thickening involving the right anterior lateral aspect of the urinary bladder this could represent focal infection versus neoplastic disease.  Further evaluation with cystoscopy possible tissue sampling would be recommended.  This area  is not significantly changed compared to 8/13/2024.  2. Diverticulosis of the left colon without diverticulitis.   LOCATION:  [facility]   Dictated by (CST): Guillermo Garcia MD on 6/20/2025 at 9:43 PM     Finalized by (CST): Guillermo Garcia MD on 6/20/2025 at  9:49 PM             [facility]     This is a 69-year-old male past medical history of obesity, diabetes, hypertension, high cholesterol, status post TURP 12/19/2024 by Dr. Davis who presents with urinary urgency, frequency.  Patient was seen in the ER on 5/25/2025 and placed on antibiotics for possible UTI urine culture was negative.  He followed up in the office with urology on 5/29/2025.  They recommended pelvic floor therapy, donut pillow, continuing Flomax and finasteride and if symptoms do not improve possible cystoscopy.  Patient states they have not improved and he feels like he has to void all the time.  He states he is a burning/station in the floor of his abdomen and his penis.  He denies fever.  No nausea or vomiting.  No back pain.  Differential includes renal colic, pyelonephritis, cystitis.    IV line was established with normal saline.  Was kept NPO.  Basic labs were obtained.  CBC, blood cell count 5.0.  Hemoglobin 13.3.  Platelet 130.    Urinalysis: Nitrite negative.  Leuk esterase trace.  WBC 6-10.  RBC greater than 10.  Bacteria rare.      CT scan ab/pelvis with pain and demonstrated asymmetric wall thickening involving the right anterolateral aspect of the urinary bladder which could but present focal infection versus neoplastic disease.  Cystoscopy was recommended with tissue sampling.      Case discussed with urology Dr. Davis who states that this is chronic condition is familiar with the patient and he can follow-up in the office.      This was communicated to patient.  He expresses understanding with plan of care.    Patient was given a small supply of Norco due to his pain.  He states it is very hard to tolerate the pain and he cannot sleep.      Disposition and Plan     Clinical Impression:  1. Urinary frequency         Disposition:  Discharge  6/20/2025 10:20 pm    Follow-up:  Sam Davis MD  [address]  [facility] [address]  [phone]    Follow up on  6/23/2025            Medications Prescribed:  Current Discharge Medication List                Supplementary Documentation:

## 2025-07-12 ENCOUNTER — HOSPITAL ENCOUNTER (OUTPATIENT)
Facility: HOSPITAL | Age: 70
Setting detail: OBSERVATION
Discharge: HOME | DRG: 699 | End: 2025-07-13
Attending: STUDENT IN AN ORGANIZED HEALTH CARE EDUCATION/TRAINING PROGRAM | Admitting: STUDENT IN AN ORGANIZED HEALTH CARE EDUCATION/TRAINING PROGRAM

## 2025-07-12 ENCOUNTER — APPOINTMENT (OUTPATIENT)
Dept: CT IMAGING | Age: 70
DRG: 699 | End: 2025-07-12
Attending: PHYSICIAN ASSISTANT

## 2025-07-12 ENCOUNTER — HOSPITAL ENCOUNTER (INPATIENT)
Facility: HOSPITAL | Age: 70
LOS: 1 days | Discharge: HOME | End: 2025-07-13
Attending: STUDENT IN AN ORGANIZED HEALTH CARE EDUCATION/TRAINING PROGRAM | Admitting: STUDENT IN AN ORGANIZED HEALTH CARE EDUCATION/TRAINING PROGRAM

## 2025-07-12 ENCOUNTER — ANESTHESIA EVENT (OUTPATIENT)
Dept: SURGERY | Facility: HOSPITAL | Age: 70
End: 2025-07-12
Payer: COMMERCIAL

## 2025-07-12 ENCOUNTER — ANESTHESIA (OUTPATIENT)
Dept: SURGERY | Facility: HOSPITAL | Age: 70
End: 2025-07-12
Payer: COMMERCIAL

## 2025-07-12 ENCOUNTER — APPOINTMENT (OUTPATIENT)
Dept: CT IMAGING | Age: 70
End: 2025-07-12
Attending: PHYSICIAN ASSISTANT

## 2025-07-12 DIAGNOSIS — R11.10 INTRACTABLE VOMITING: ICD-10-CM

## 2025-07-12 DIAGNOSIS — N40.1 BPH LOC W URIN OBS/LUTS: Primary | ICD-10-CM

## 2025-07-12 DIAGNOSIS — N41.0 ACUTE PROSTATITIS: ICD-10-CM

## 2025-07-12 DIAGNOSIS — R10.9 ABDOMINAL PAIN, ACUTE: ICD-10-CM

## 2025-07-12 LAB
ALBUMIN SERPL-MCNC: 4.7 G/DL (ref 3.2–4.8)
ALBUMIN/GLOB SERPL: 2 (ref 1–2)
ALP LIVER SERPL-CCNC: 67 U/L (ref 45–117)
ALT SERPL-CCNC: 45 U/L (ref 10–49)
ANION GAP SERPL CALC-SCNC: 7 MMOL/L (ref 0–18)
AST SERPL-CCNC: 36 U/L (ref ?–34)
BASOPHILS # BLD AUTO: 0.03 X10(3) UL (ref 0–0.2)
BASOPHILS NFR BLD AUTO: 0.6 %
BILIRUB SERPL-MCNC: 0.7 MG/DL (ref 0.2–1.1)
BILIRUB UR QL STRIP.AUTO: NEGATIVE
BUN BLD-MCNC: 12 MG/DL (ref 9–23)
CALCIUM BLD-MCNC: 9.4 MG/DL (ref 8.7–10.6)
CHLORIDE SERPL-SCNC: 104 MMOL/L (ref 98–112)
CLARITY UR REFRACT.AUTO: CLEAR
CO2 SERPL-SCNC: 26 MMOL/L (ref 21–32)
COLOR UR AUTO: YELLOW
CREAT BLD-MCNC: 1.06 MG/DL (ref 0.7–1.3)
EGFRCR SERPLBLD CKD-EPI 2021: 76 ML/MIN/1.73M2 (ref 60–?)
EOSINOPHIL # BLD AUTO: 0.06 X10(3) UL (ref 0–0.7)
EOSINOPHIL NFR BLD AUTO: 1.1 %
ERYTHROCYTE [DISTWIDTH] IN BLOOD BY AUTOMATED COUNT: 13.8 %
GLOBULIN PLAS-MCNC: 2.3 G/DL (ref 2–3.5)
GLUCOSE BLD-MCNC: 189 MG/DL (ref 70–99)
GLUCOSE BLD-MCNC: 247 MG/DL (ref 70–99)
GLUCOSE BLD-MCNC: 265 MG/DL (ref 70–99)
GLUCOSE UR STRIP.AUTO-MCNC: NEGATIVE MG/DL
HCT VFR BLD AUTO: 40.3 % (ref 39–53)
HGB BLD-MCNC: 14.7 G/DL (ref 13–17.5)
IMM GRANULOCYTES # BLD AUTO: 0.01 X10(3) UL (ref 0–1)
IMM GRANULOCYTES NFR BLD: 0.2 %
KETONES UR STRIP.AUTO-MCNC: NEGATIVE MG/DL
LACTATE SERPL-SCNC: 1.8 MMOL/L (ref 0.5–2)
LEUKOCYTE ESTERASE UR QL STRIP.AUTO: NEGATIVE
LIPASE SERPL-CCNC: 37 U/L (ref 12–53)
LYMPHOCYTES # BLD AUTO: 1.28 X10(3) UL (ref 1–4)
LYMPHOCYTES NFR BLD AUTO: 24.5 %
MCH RBC QN AUTO: 30.5 PG (ref 26–34)
MCHC RBC AUTO-ENTMCNC: 36.5 G/DL (ref 31–37)
MCV RBC AUTO: 83.6 FL (ref 80–100)
MONOCYTES # BLD AUTO: 0.4 X10(3) UL (ref 0.1–1)
MONOCYTES NFR BLD AUTO: 7.6 %
NEUTROPHILS # BLD AUTO: 3.45 X10 (3) UL (ref 1.5–7.7)
NEUTROPHILS # BLD AUTO: 3.45 X10(3) UL (ref 1.5–7.7)
NEUTROPHILS NFR BLD AUTO: 66 %
NITRITE UR QL STRIP.AUTO: NEGATIVE
OSMOLALITY SERPL CALC.SUM OF ELEC: 289 MOSM/KG (ref 275–295)
PH UR STRIP.AUTO: 7 (ref 5–8)
PLATELET # BLD AUTO: 140 10(3)UL (ref 150–450)
POTASSIUM SERPL-SCNC: 4.2 MMOL/L (ref 3.5–5.1)
PROT SERPL-MCNC: 7 G/DL (ref 5.7–8.2)
RBC # BLD AUTO: 4.82 X10(6)UL (ref 3.8–5.8)
RBC #/AREA URNS AUTO: >10 /HPF
SODIUM SERPL-SCNC: 137 MMOL/L (ref 136–145)
SP GR UR STRIP.AUTO: 1.01 (ref 1–1.03)
UROBILINOGEN UR STRIP.AUTO-MCNC: 0.2 MG/DL
WBC # BLD AUTO: 5.2 X10(3) UL (ref 4–11)

## 2025-07-12 PROCEDURE — 51102 DRAIN BL W/CATH INSERTION: CPT | Performed by: UROLOGY

## 2025-07-12 PROCEDURE — 99222 1ST HOSP IP/OBS MODERATE 55: CPT | Performed by: UROLOGY

## 2025-07-12 PROCEDURE — 74177 CT ABD & PELVIS W/CONTRAST: CPT | Performed by: PHYSICIAN ASSISTANT

## 2025-07-12 RX ORDER — HYDROCODONE BITARTRATE AND ACETAMINOPHEN 5; 325 MG/1; MG/1
1 TABLET ORAL EVERY 4 HOURS PRN
Status: DISCONTINUED | OUTPATIENT
Start: 2025-07-12 | End: 2025-07-13

## 2025-07-12 RX ORDER — SENNOSIDES 8.6 MG
17.2 TABLET ORAL NIGHTLY PRN
Status: DISCONTINUED | OUTPATIENT
Start: 2025-07-12 | End: 2025-07-13

## 2025-07-12 RX ORDER — NICOTINE POLACRILEX 4 MG
15 LOZENGE BUCCAL
Status: DISCONTINUED | OUTPATIENT
Start: 2025-07-12 | End: 2025-07-13 | Stop reason: HOSPADM

## 2025-07-12 RX ORDER — HYDROMORPHONE HYDROCHLORIDE 1 MG/ML
0.4 INJECTION, SOLUTION INTRAMUSCULAR; INTRAVENOUS; SUBCUTANEOUS EVERY 2 HOUR PRN
Status: DISCONTINUED | OUTPATIENT
Start: 2025-07-12 | End: 2025-07-13

## 2025-07-12 RX ORDER — HYDROCODONE BITARTRATE AND ACETAMINOPHEN 5; 325 MG/1; MG/1
1 TABLET ORAL ONCE AS NEEDED
Status: ACTIVE | OUTPATIENT
Start: 2025-07-12 | End: 2025-07-12

## 2025-07-12 RX ORDER — HYDROMORPHONE HYDROCHLORIDE 1 MG/ML
0.5 INJECTION, SOLUTION INTRAMUSCULAR; INTRAVENOUS; SUBCUTANEOUS ONCE
Refills: 0 | Status: COMPLETED | OUTPATIENT
Start: 2025-07-12 | End: 2025-07-12

## 2025-07-12 RX ORDER — FINASTERIDE 5 MG/1
5 TABLET, FILM COATED ORAL DAILY
Status: DISCONTINUED | OUTPATIENT
Start: 2025-07-12 | End: 2025-07-13

## 2025-07-12 RX ORDER — PROCHLORPERAZINE MALEATE 10 MG
10 TABLET ORAL ONCE
Status: DISCONTINUED | OUTPATIENT
Start: 2025-07-12 | End: 2025-07-13

## 2025-07-12 RX ORDER — BISACODYL 10 MG
10 SUPPOSITORY, RECTAL RECTAL
Status: DISCONTINUED | OUTPATIENT
Start: 2025-07-12 | End: 2025-07-13

## 2025-07-12 RX ORDER — AMLODIPINE BESYLATE 5 MG/1
5 TABLET ORAL NIGHTLY
Status: DISCONTINUED | OUTPATIENT
Start: 2025-07-12 | End: 2025-07-13

## 2025-07-12 RX ORDER — NALOXONE HYDROCHLORIDE 0.4 MG/ML
0.08 INJECTION, SOLUTION INTRAMUSCULAR; INTRAVENOUS; SUBCUTANEOUS AS NEEDED
Status: DISCONTINUED | OUTPATIENT
Start: 2025-07-12 | End: 2025-07-13 | Stop reason: HOSPADM

## 2025-07-12 RX ORDER — CEFAZOLIN SODIUM 1 G/3ML
INJECTION, POWDER, FOR SOLUTION INTRAMUSCULAR; INTRAVENOUS AS NEEDED
Status: DISCONTINUED | OUTPATIENT
Start: 2025-07-12 | End: 2025-07-13 | Stop reason: SURG

## 2025-07-12 RX ORDER — DEXAMETHASONE SODIUM PHOSPHATE 4 MG/ML
VIAL (ML) INJECTION AS NEEDED
Status: DISCONTINUED | OUTPATIENT
Start: 2025-07-12 | End: 2025-07-13 | Stop reason: SURG

## 2025-07-12 RX ORDER — ATORVASTATIN CALCIUM 40 MG/1
40 TABLET, FILM COATED ORAL NIGHTLY
Status: DISCONTINUED | OUTPATIENT
Start: 2025-07-12 | End: 2025-07-13

## 2025-07-12 RX ORDER — ACETAMINOPHEN 500 MG
1000 TABLET ORAL ONCE AS NEEDED
Status: ACTIVE | OUTPATIENT
Start: 2025-07-12 | End: 2025-07-12

## 2025-07-12 RX ORDER — HYDROMORPHONE HYDROCHLORIDE 1 MG/ML
0.4 INJECTION, SOLUTION INTRAMUSCULAR; INTRAVENOUS; SUBCUTANEOUS EVERY 5 MIN PRN
Status: DISCONTINUED | OUTPATIENT
Start: 2025-07-12 | End: 2025-07-13 | Stop reason: HOSPADM

## 2025-07-12 RX ORDER — SODIUM PHOSPHATE, DIBASIC AND SODIUM PHOSPHATE, MONOBASIC 7; 19 G/230ML; G/230ML
1 ENEMA RECTAL ONCE AS NEEDED
Status: DISCONTINUED | OUTPATIENT
Start: 2025-07-12 | End: 2025-07-13

## 2025-07-12 RX ORDER — AMLODIPINE BESYLATE 5 MG/1
5 TABLET ORAL DAILY
Status: DISCONTINUED | OUTPATIENT
Start: 2025-07-12 | End: 2025-07-12

## 2025-07-12 RX ORDER — DEXTROSE MONOHYDRATE 25 G/50ML
50 INJECTION, SOLUTION INTRAVENOUS
Status: DISCONTINUED | OUTPATIENT
Start: 2025-07-12 | End: 2025-07-13 | Stop reason: HOSPADM

## 2025-07-12 RX ORDER — SERTRALINE HYDROCHLORIDE 100 MG/1
200 TABLET, FILM COATED ORAL NIGHTLY
Status: DISCONTINUED | OUTPATIENT
Start: 2025-07-12 | End: 2025-07-13

## 2025-07-12 RX ORDER — HEPARIN SODIUM 5000 [USP'U]/ML
5000 INJECTION, SOLUTION INTRAVENOUS; SUBCUTANEOUS EVERY 8 HOURS SCHEDULED
Status: DISCONTINUED | OUTPATIENT
Start: 2025-07-12 | End: 2025-07-13

## 2025-07-12 RX ORDER — ROCURONIUM BROMIDE 10 MG/ML
INJECTION, SOLUTION INTRAVENOUS AS NEEDED
Status: DISCONTINUED | OUTPATIENT
Start: 2025-07-12 | End: 2025-07-13 | Stop reason: SURG

## 2025-07-12 RX ORDER — SODIUM CHLORIDE, SODIUM LACTATE, POTASSIUM CHLORIDE, CALCIUM CHLORIDE 600; 310; 30; 20 MG/100ML; MG/100ML; MG/100ML; MG/100ML
INJECTION, SOLUTION INTRAVENOUS CONTINUOUS
Status: DISCONTINUED | OUTPATIENT
Start: 2025-07-12 | End: 2025-07-13 | Stop reason: HOSPADM

## 2025-07-12 RX ORDER — HYDRALAZINE HYDROCHLORIDE 20 MG/ML
10 INJECTION INTRAMUSCULAR; INTRAVENOUS EVERY 4 HOURS PRN
Status: DISCONTINUED | OUTPATIENT
Start: 2025-07-12 | End: 2025-07-13

## 2025-07-12 RX ORDER — TAMSULOSIN HYDROCHLORIDE 0.4 MG/1
0.4 CAPSULE ORAL ONCE
Status: DISCONTINUED | OUTPATIENT
Start: 2025-07-12 | End: 2025-07-13

## 2025-07-12 RX ORDER — PROCHLORPERAZINE EDISYLATE 5 MG/ML
INJECTION INTRAMUSCULAR; INTRAVENOUS
Status: COMPLETED
Start: 2025-07-12 | End: 2025-07-12

## 2025-07-12 RX ORDER — DIAZEPAM 10 MG/2ML
2.5 INJECTION, SOLUTION INTRAMUSCULAR; INTRAVENOUS ONCE
Status: COMPLETED | OUTPATIENT
Start: 2025-07-12 | End: 2025-07-12

## 2025-07-12 RX ORDER — ONDANSETRON 2 MG/ML
4 INJECTION INTRAMUSCULAR; INTRAVENOUS ONCE
Status: COMPLETED | OUTPATIENT
Start: 2025-07-12 | End: 2025-07-12

## 2025-07-12 RX ORDER — DIPHENHYDRAMINE HYDROCHLORIDE 50 MG/ML
25 INJECTION, SOLUTION INTRAMUSCULAR; INTRAVENOUS ONCE
Status: COMPLETED | OUTPATIENT
Start: 2025-07-12 | End: 2025-07-12

## 2025-07-12 RX ORDER — HYDROMORPHONE HYDROCHLORIDE 1 MG/ML
0.2 INJECTION, SOLUTION INTRAMUSCULAR; INTRAVENOUS; SUBCUTANEOUS EVERY 5 MIN PRN
Status: DISCONTINUED | OUTPATIENT
Start: 2025-07-12 | End: 2025-07-13 | Stop reason: HOSPADM

## 2025-07-12 RX ORDER — SODIUM CHLORIDE, SODIUM LACTATE, POTASSIUM CHLORIDE, CALCIUM CHLORIDE 600; 310; 30; 20 MG/100ML; MG/100ML; MG/100ML; MG/100ML
INJECTION, SOLUTION INTRAVENOUS CONTINUOUS PRN
Status: DISCONTINUED | OUTPATIENT
Start: 2025-07-12 | End: 2025-07-13 | Stop reason: SURG

## 2025-07-12 RX ORDER — TAMSULOSIN HYDROCHLORIDE 0.4 MG/1
0.8 CAPSULE ORAL DAILY
Status: DISCONTINUED | OUTPATIENT
Start: 2025-07-13 | End: 2025-07-13

## 2025-07-12 RX ORDER — HYDROCODONE BITARTRATE AND ACETAMINOPHEN 5; 325 MG/1; MG/1
2 TABLET ORAL EVERY 4 HOURS PRN
Status: DISCONTINUED | OUTPATIENT
Start: 2025-07-12 | End: 2025-07-13

## 2025-07-12 RX ORDER — LIDOCAINE HYDROCHLORIDE 20 MG/ML
5 JELLY TOPICAL ONCE
Status: COMPLETED | OUTPATIENT
Start: 2025-07-12 | End: 2025-07-12

## 2025-07-12 RX ORDER — HYDROMORPHONE HYDROCHLORIDE 1 MG/ML
0.6 INJECTION, SOLUTION INTRAMUSCULAR; INTRAVENOUS; SUBCUTANEOUS EVERY 5 MIN PRN
Status: DISCONTINUED | OUTPATIENT
Start: 2025-07-12 | End: 2025-07-13 | Stop reason: HOSPADM

## 2025-07-12 RX ORDER — HYDROMORPHONE HYDROCHLORIDE 1 MG/ML
0.8 INJECTION, SOLUTION INTRAMUSCULAR; INTRAVENOUS; SUBCUTANEOUS EVERY 2 HOUR PRN
Status: DISCONTINUED | OUTPATIENT
Start: 2025-07-12 | End: 2025-07-13

## 2025-07-12 RX ORDER — LIDOCAINE HYDROCHLORIDE 20 MG/ML
JELLY TOPICAL
Status: COMPLETED
Start: 2025-07-12 | End: 2025-07-12

## 2025-07-12 RX ORDER — METOCLOPRAMIDE HYDROCHLORIDE 5 MG/ML
10 INJECTION INTRAMUSCULAR; INTRAVENOUS EVERY 8 HOURS PRN
Status: DISCONTINUED | OUTPATIENT
Start: 2025-07-12 | End: 2025-07-13

## 2025-07-12 RX ORDER — POLYETHYLENE GLYCOL 3350 17 G/17G
17 POWDER, FOR SOLUTION ORAL DAILY PRN
Status: DISCONTINUED | OUTPATIENT
Start: 2025-07-12 | End: 2025-07-13

## 2025-07-12 RX ORDER — ACETAMINOPHEN 325 MG/1
650 TABLET ORAL EVERY 4 HOURS PRN
Status: DISCONTINUED | OUTPATIENT
Start: 2025-07-12 | End: 2025-07-13

## 2025-07-12 RX ORDER — NICOTINE POLACRILEX 4 MG
30 LOZENGE BUCCAL
Status: DISCONTINUED | OUTPATIENT
Start: 2025-07-12 | End: 2025-07-13 | Stop reason: HOSPADM

## 2025-07-12 RX ORDER — METOCLOPRAMIDE HYDROCHLORIDE 5 MG/ML
10 INJECTION INTRAMUSCULAR; INTRAVENOUS ONCE
Status: COMPLETED | OUTPATIENT
Start: 2025-07-12 | End: 2025-07-12

## 2025-07-12 RX ORDER — PANTOPRAZOLE SODIUM 40 MG/1
40 TABLET, DELAYED RELEASE ORAL
Status: DISCONTINUED | OUTPATIENT
Start: 2025-07-13 | End: 2025-07-13

## 2025-07-12 RX ORDER — HYDROCODONE BITARTRATE AND ACETAMINOPHEN 5; 325 MG/1; MG/1
2 TABLET ORAL ONCE AS NEEDED
Status: ACTIVE | OUTPATIENT
Start: 2025-07-12 | End: 2025-07-12

## 2025-07-12 RX ORDER — ONDANSETRON 2 MG/ML
4 INJECTION INTRAMUSCULAR; INTRAVENOUS EVERY 6 HOURS PRN
Status: DISCONTINUED | OUTPATIENT
Start: 2025-07-12 | End: 2025-07-13

## 2025-07-12 RX ORDER — ZOLPIDEM TARTRATE 5 MG/1
10 TABLET ORAL NIGHTLY PRN
Status: DISCONTINUED | OUTPATIENT
Start: 2025-07-12 | End: 2025-07-13

## 2025-07-12 RX ORDER — TAMSULOSIN HYDROCHLORIDE 0.4 MG/1
0.4 CAPSULE ORAL ONCE
Status: DISCONTINUED | OUTPATIENT
Start: 2025-07-12 | End: 2025-07-12

## 2025-07-12 RX ORDER — TAMSULOSIN HYDROCHLORIDE 0.4 MG/1
0.4 CAPSULE ORAL DAILY
Status: DISCONTINUED | OUTPATIENT
Start: 2025-07-13 | End: 2025-07-12

## 2025-07-12 RX ORDER — HYDROMORPHONE HYDROCHLORIDE 1 MG/ML
0.2 INJECTION, SOLUTION INTRAMUSCULAR; INTRAVENOUS; SUBCUTANEOUS EVERY 2 HOUR PRN
Status: DISCONTINUED | OUTPATIENT
Start: 2025-07-12 | End: 2025-07-13

## 2025-07-12 RX ORDER — IBUPROFEN 600 MG/1
600 TABLET, FILM COATED ORAL ONCE AS NEEDED
Status: ACTIVE | OUTPATIENT
Start: 2025-07-12 | End: 2025-07-12

## 2025-07-12 RX ADMIN — SODIUM CHLORIDE, SODIUM LACTATE, POTASSIUM CHLORIDE, CALCIUM CHLORIDE: 600; 310; 30; 20 INJECTION, SOLUTION INTRAVENOUS at 22:36:00

## 2025-07-12 RX ADMIN — ROCURONIUM BROMIDE 30 MG: 10 INJECTION, SOLUTION INTRAVENOUS at 23:05:00

## 2025-07-12 RX ADMIN — DEXAMETHASONE SODIUM PHOSPHATE 4 MG: 4 MG/ML VIAL (ML) INJECTION at 22:56:00

## 2025-07-12 RX ADMIN — CEFAZOLIN SODIUM 2 G: 1 INJECTION, POWDER, FOR SOLUTION INTRAMUSCULAR; INTRAVENOUS at 22:52:00

## 2025-07-12 RX ADMIN — METOCLOPRAMIDE HYDROCHLORIDE 10 MG: 5 INJECTION INTRAMUSCULAR; INTRAVENOUS at 22:56:00

## 2025-07-12 NOTE — ED QUICK NOTES
Attempted hunter cath 16 Greek. Unable to pass the prostate. MD Galladro informed. Unable to urinate

## 2025-07-12 NOTE — ED QUICK NOTES
Orders for admission, patient is aware of plan and ready to go upstairs. Any questions, please call ED RN rl at extension [phone].     Patient Covid vaccination status: Partially vaccinated     COVID Test Ordered in ED: None    COVID Suspicion at Admission: N/A    Running Infusions: Medication Infusions[1] None    Mental Status/LOC at time of transport: a&ox4    Other pertinent information:   CIWA score: N/A   NIH score:  N/A             [1]

## 2025-07-12 NOTE — ED PROVIDER NOTES
I reviewed the chart and discussed the case with the VERONICA.  I provided a substantive portion of care for this patient.  I personally performed the medical decision making for this encounter in conjunction with VERONICA. I have examined the patient and noted diffuse lower abdominal tenderness to palpation, quite uncomfortable with intermittent episodes of nausea, emesis and diaphoresis.  Has been on prophylactic antibiotics for presumed reticulitis for the past few days.  Patient is neurovascularly intact distally.  Urinating a few cc at a time, bladder scanning with about 350 cc.  CT redemonstrating abnormal bladder with no other acute surgical pathology.  Patient requiring numerous doses of pain medicines and nausea medicine.  Numerous attempts at bladder decompression, relieved about 150 cc of urine with pediatric size, unable to advance anything past prostate.  Does not appear consistent with infection.  Patient will be admitted for further workup and care. Consulted with urology. Endorsed to hospitalist.    I agree with the following clinical impression(s):  BPH loc w urin obs/LUTS  (primary encounter diagnosis)  Abdominal pain, acute  Intractable vomiting.    CT ABDOMEN+PELVIS(CONTRAST ONLY)(CPT=74177)  Result Date: 7/12/2025  CONCLUSION: 1.  No acute abnormality in the abdomen or pelvis. 2.  Colonic diverticulosis. 3.  Mild irregular urinary bladder wall thickening involving the right anterolateral wall. Recommend further evaluation with cystoscopy. Electronically Verified and Signed by Attending Radiologist: Yrn Fernando MD 7/12/2025 2:06 PM Workstation: [id number]

## 2025-07-12 NOTE — ED PROVIDER NOTES
Patient Seen in: [facility] In [place]        History  Chief Complaint   Patient presents with    Abdomen/Flank Pain     Stated Complaint: abd pain/diverticulitis    Subjective:   HPI            69-year-old gentleman.  Patient explains that he has had multiple previous episodes of diverticulitis.  He began to have similar symptoms last week.  He underwent a televisit and was empirically started on ciprofloxacin and Flagyl.  By taking the antibiotic these last 7 days his symptoms have progressed.  He complains of increased abdominal distention and worsened left lower quadrant abdominal pain.  He denies any systemic fever or chills.  No obvious blood or mucus in stools.  No urinary complaints      Objective:     Past Medical History:    Arthritis    Cataract    early stages    Coronary atherosclerosis    Depression    Diabetes (HCC)    Diverticulosis of large intestine    Esophageal reflux    Essential hypertension    Hearing impaired person, bilateral    bilateral hearing aids    High blood pressure    High cholesterol    Hx of motion sickness    Hyperlipidemia    Migraines    in the past    PONV (postoperative nausea and vomiting)    Sleep apnea    NO TX              Past Surgical History:   Procedure Laterality Date    Angioplasty (coronary)  2019    Arthroscopy of joint unlisted      knee x2    Cath bare metal stent (bms)      Colonoscopy  2022    Hernia surgery      Hip replacement surgery  2022    Other surgical history      elbow wound    Other surgical history      vocal cord    Tonsillectomy  1961    Total hip replacement      Vasectomy  2001                Social History     Socioeconomic History    Marital status:    Tobacco Use    Smoking status: Never    Smokeless tobacco: Never   Vaping Use    Vaping status: Never Used   Substance and Sexual Activity    Alcohol use: Yes     Alcohol/week: 1.0 standard drink of alcohol     Types: 1 Cans of beer per week     Comment: [employer] 1  beer a month    Drug use: Never   Other Topics Concern    Caffeine Concern No    Exercise No    Seat Belt No    Special Diet No    Stress Concern No    Weight Concern Yes     Social Drivers of Health     Food Insecurity: No Food Insecurity (12/19/2024)    Food Insecurity     Food Insecurity: Never true   Transportation Needs: No Transportation Needs (12/19/2024)    Transportation Needs     Lack of Transportation: No   Housing Stability: Low Risk  (12/19/2024)    Housing Stability     Housing Instability: No                                Physical Exam    ED Triage Vitals   BP 07/12/25 1103 (!) 179/108   Pulse 07/12/25 1101 77   Resp 07/12/25 1101 16   Temp 07/12/25 1101 98.8 °F (37.1 °C)   Temp src --    SpO2 07/12/25 1101 95 %   O2 Device 07/12/25 1101 None (Room air)       Current Vitals:   Vital Signs  BP: (!) 179/103  Pulse: 100  Resp: 16  Temp: 98.8 °F (37.1 °C)    Oxygen Therapy  SpO2: 96 %  O2 Device: None (Room air)            Physical Exam     Gen: Well appearing, well groomed, alert and aware x 3  Neck: Supple, full range of motion, no thyromegaly or lymphadenopathy.  Abdominal: Patient is abdomen is distended.  Moderate pain to palpation in the left lower quadrant and suprapubic region  Back: Full active range of motion.  No CVA tenderness bilaterally.  Lung: No distress, RR, no retraction, breath sounds are clear bilaterally  Cardio: Regular rate and rhythm, normal S1-S2, no murmur appreciable      ED Course  Labs Reviewed   CBC WITH DIFFERENTIAL WITH PLATELET - Abnormal; Notable for the following components:       Result Value    .0 (*)     All other components within normal limits   COMP METABOLIC PANEL (14) - Abnormal; Notable for the following components:    Glucose 189 (*)     AST 36 (*)     All other components within normal limits   URINALYSIS, ROUTINE - Abnormal; Notable for the following components:    Blood Urine Large (*)     Protein Urine Trace (*)     All other components within  normal limits   UA MICROSCOPIC ONLY, URINE - Abnormal; Notable for the following components:    RBC Urine >10 (*)     Bacteria Urine Rare (*)     All other components within normal limits   LIPASE - Normal   LACTIC ACID, PLASMA - Normal   RAINBOW DRAW LAVENDER   RAINBOW DRAW LIGHT GREEN   BLOOD CULTURE   BLOOD CULTURE                            MDM     CBC, CMP, lipase, blood cultures and lactic acid.  Fluid bolus.  IV Zosyn.  CT of the abdomen pelvis with IV contrast    Admission disposition: 7/12/2025  2:59 PM       CBC demonstrates no leukocytosis.  Platelets 140.    Glucose 189, AST of 36    Lipase within normal limits    Lactic negative    When attempting to provide us with a urinary sample patient endorsed sensation of needing to urinate but was unable to provide a sample.  He has had multiple previous episodes of urinary retention status post TURP.  Bladder scanner was performed and demonstrated 350 retained.  Nurse attempted to straight cath the patient but this was unsuccessful.  He was then sent for CT.  If no other explanation or etiology is discovered on CT we will again attemptcatheterization with coudé    CT ABDOMEN+PELVIS(CONTRAST ONLY)(CPT=74177) (Final result)  Result time 07/12/25 14:06:27  Final result by Yrn Fernando MD (07/12/25 14:06:27)                Impression:    CONCLUSION:    1.  No acute abnormality in the abdomen or pelvis.  2.  Colonic diverticulosis.  3.  Mild irregular urinary bladder wall thickening involving the right anterolateral wall. Recommend further evaluation with cystoscopy.                Eventually, catheterization was successful and roughly 150 drained.  This did offer some relief of the patient's discomfort.  This was checked for infectious etiologies.    Urinalysis is not suggestive of infectious etiologies    Patient continues to have significant discomfort and episodic vomiting    Case will be discussed with urology and we will plan on admission    Medical  Decision Making      Disposition and Plan     Clinical Impression:  1. BPH loc w urin obs/LUTS    2. Abdominal pain, acute    3. Intractable vomiting    4. Acute prostatitis         Disposition:  Admit  7/12/2025  2:59 pm    Follow-up:  No follow-up provider specified.        Medications Prescribed:  Current Discharge Medication List                Supplementary Documentation:         Hospital Problems       Present on Admission  Date Reviewed: 5/29/2025          ICD-10-CM Noted POA    * (Principal) BPH loc w urin obs/LUTS N40.1 7/12/2025 Unknown

## 2025-07-13 VITALS
TEMPERATURE: 98 F | RESPIRATION RATE: 18 BRPM | OXYGEN SATURATION: 96 % | WEIGHT: 262 LBS | HEIGHT: 73 IN | DIASTOLIC BLOOD PRESSURE: 83 MMHG | HEART RATE: 69 BPM | SYSTOLIC BLOOD PRESSURE: 148 MMHG | BODY MASS INDEX: 34.72 KG/M2

## 2025-07-13 LAB
ANION GAP SERPL CALC-SCNC: 9 MMOL/L (ref 0–18)
BASOPHILS # BLD AUTO: 0.01 X10(3) UL (ref 0–0.2)
BASOPHILS NFR BLD AUTO: 0.1 %
BUN BLD-MCNC: 11 MG/DL (ref 9–23)
CALCIUM BLD-MCNC: 8.9 MG/DL (ref 8.7–10.6)
CHLORIDE SERPL-SCNC: 100 MMOL/L (ref 98–112)
CO2 SERPL-SCNC: 27 MMOL/L (ref 21–32)
CREAT BLD-MCNC: 1.16 MG/DL (ref 0.7–1.3)
EGFRCR SERPLBLD CKD-EPI 2021: 68 ML/MIN/1.73M2 (ref 60–?)
EOSINOPHIL # BLD AUTO: 0 X10(3) UL (ref 0–0.7)
EOSINOPHIL NFR BLD AUTO: 0 %
ERYTHROCYTE [DISTWIDTH] IN BLOOD BY AUTOMATED COUNT: 13.9 %
GLUCOSE BLD-MCNC: 138 MG/DL (ref 70–99)
GLUCOSE BLD-MCNC: 174 MG/DL (ref 70–99)
GLUCOSE BLD-MCNC: 224 MG/DL (ref 70–99)
GLUCOSE BLD-MCNC: 258 MG/DL (ref 70–99)
GLUCOSE BLD-MCNC: 265 MG/DL (ref 70–99)
HCT VFR BLD AUTO: 38.6 % (ref 39–53)
HGB BLD-MCNC: 13.6 G/DL (ref 13–17.5)
IMM GRANULOCYTES # BLD AUTO: 0.02 X10(3) UL (ref 0–1)
IMM GRANULOCYTES NFR BLD: 0.3 %
LYMPHOCYTES # BLD AUTO: 0.47 X10(3) UL (ref 1–4)
LYMPHOCYTES NFR BLD AUTO: 6.5 %
MCH RBC QN AUTO: 29.3 PG (ref 26–34)
MCHC RBC AUTO-ENTMCNC: 35.2 G/DL (ref 31–37)
MCV RBC AUTO: 83.2 FL (ref 80–100)
MONOCYTES # BLD AUTO: 0.39 X10(3) UL (ref 0.1–1)
MONOCYTES NFR BLD AUTO: 5.4 %
NEUTROPHILS # BLD AUTO: 6.35 X10 (3) UL (ref 1.5–7.7)
NEUTROPHILS # BLD AUTO: 6.35 X10(3) UL (ref 1.5–7.7)
NEUTROPHILS NFR BLD AUTO: 87.7 %
OSMOLALITY SERPL CALC.SUM OF ELEC: 288 MOSM/KG (ref 275–295)
PLATELET # BLD AUTO: 152 10(3)UL (ref 150–450)
PLATELETS.RETICULATED NFR BLD AUTO: 1.3 % (ref 0–7)
POTASSIUM SERPL-SCNC: 3.8 MMOL/L (ref 3.5–5.1)
RBC # BLD AUTO: 4.64 X10(6)UL (ref 3.8–5.8)
SODIUM SERPL-SCNC: 136 MMOL/L (ref 136–145)
WBC # BLD AUTO: 7.2 X10(3) UL (ref 4–11)

## 2025-07-13 RX ORDER — HYDROMORPHONE HYDROCHLORIDE 1 MG/ML
0.4 INJECTION, SOLUTION INTRAMUSCULAR; INTRAVENOUS; SUBCUTANEOUS EVERY 5 MIN PRN
Status: DISCONTINUED | OUTPATIENT
Start: 2025-07-13 | End: 2025-07-13 | Stop reason: HOSPADM

## 2025-07-13 RX ORDER — SULFAMETHOXAZOLE AND TRIMETHOPRIM 800; 160 MG/1; MG/1
1 TABLET ORAL 2 TIMES DAILY
Qty: 10 TABLET | Refills: 0 | Status: SHIPPED | OUTPATIENT
Start: 2025-07-13 | End: 2025-07-18

## 2025-07-13 RX ORDER — INSULIN ASPART 100 [IU]/ML
INJECTION, SOLUTION INTRAVENOUS; SUBCUTANEOUS
Status: COMPLETED
Start: 2025-07-13 | End: 2025-07-13

## 2025-07-13 RX ORDER — ONDANSETRON 2 MG/ML
4 INJECTION INTRAMUSCULAR; INTRAVENOUS ONCE
Status: DISCONTINUED | OUTPATIENT
Start: 2025-07-13 | End: 2025-07-13 | Stop reason: HOSPADM

## 2025-07-13 RX ORDER — SODIUM CHLORIDE, SODIUM LACTATE, POTASSIUM CHLORIDE, CALCIUM CHLORIDE 600; 310; 30; 20 MG/100ML; MG/100ML; MG/100ML; MG/100ML
INJECTION, SOLUTION INTRAVENOUS CONTINUOUS
Status: DISCONTINUED | OUTPATIENT
Start: 2025-07-13 | End: 2025-07-13 | Stop reason: HOSPADM

## 2025-07-13 RX ORDER — LABETALOL HYDROCHLORIDE 5 MG/ML
10 INJECTION, SOLUTION INTRAVENOUS EVERY 4 HOURS PRN
Status: DISCONTINUED | OUTPATIENT
Start: 2025-07-13 | End: 2025-07-13

## 2025-07-13 RX ORDER — INSULIN ASPART 100 [IU]/ML
INJECTION, SOLUTION INTRAVENOUS; SUBCUTANEOUS ONCE
Status: COMPLETED | OUTPATIENT
Start: 2025-07-13 | End: 2025-07-13

## 2025-07-13 RX ORDER — HYDROMORPHONE HYDROCHLORIDE 1 MG/ML
0.6 INJECTION, SOLUTION INTRAMUSCULAR; INTRAVENOUS; SUBCUTANEOUS EVERY 5 MIN PRN
Status: DISCONTINUED | OUTPATIENT
Start: 2025-07-13 | End: 2025-07-13 | Stop reason: HOSPADM

## 2025-07-13 RX ORDER — HYDROMORPHONE HYDROCHLORIDE 1 MG/ML
0.2 INJECTION, SOLUTION INTRAMUSCULAR; INTRAVENOUS; SUBCUTANEOUS EVERY 5 MIN PRN
Status: DISCONTINUED | OUTPATIENT
Start: 2025-07-13 | End: 2025-07-13 | Stop reason: HOSPADM

## 2025-07-13 NOTE — OPERATIVE REPORT
[facility]   part of [facility]    Operative Note     Delfin [facility] Location: OR   CSN [id number] MRN [id number]   Admission Date 7/12/2025 Operation Date 7/12/2025   Attending Physician Delfino Strange DO Operating Physician Марина Patino MD      Preoperative Diagnosis: Urinary retention [R33.9], history of BPH, status post TURP December 2024     Postoperative Diagnosis: Urinary retention, history of BPH, status post TURP December 2024    Bladder neck contracture     Procedure Performed:   Cystoscopy, placement of suprapubic catheter     Primary Surgeon: Марина Patino MD      Assistant: None     Surgical Findings: Thick bladder neck contracture.  Opening less than 1 to 2 mm in size.  Contracture very thick and not amenable to dilation using both the Bard urologist dilator or the Bejarano metal dilators.  Moderate amount of clot seen in the prostatic urethra likely secondary to multiple Betancourt catheter attempts earlier today.     Anesthesia: General     Complications: None     Implants: * No implants in log *     Specimen: None     Drains: 12 Sami suprapubic catheter     Condition: Stable     Estimated Blood Loss: No data recorded     Summary of Case: After consent was obtained and preoperative antibiotics administered the patient was brought into the operating room.  Anesthesia was administered and he was placed in the dorsolithotomy position.  The patient had been started on intravenous Zosyn earlier today.  A 17 Sami rigid cystoscope was placed through the urethra.  A bladder neck contracture was seen in the proximal prostatic urethra/bladder neck.  A true lumen could be seen measuring no more than 1 to 2 mm at the 12 o'clock position.  A wire was placed through the opening into the bladder.  A 6 Sami open-ended catheter was advanced over the wire and I aspirated using a 20 cc Luer-Charan syringe what appears to be urine.  The wire was replaced and the 6 Sami open-ended catheter was removed.  I  attempted to dilate the stricture/contracture using both the Bard urologist tray.  These plastic catheters curled and were not getting through the structure into the bladder.  I then attempted to use the Bejarano metal dilators over the wire.  Again I was unable to pop through the stricture into the bladder.  As such, I decided to place a suprapubic catheter.  A small incision in the vertical orientation was made about 1 to 2 fingerbreadths above the symphysis pubis.  Using a large bore spinal needle I was able to ascertain the depth and the trajectory of the bladder.  I aspirated urine through the spinal needle.  Using the same trajectory and depth a Bard suprapubic catheter was placed using an introduction harpoon.  The harpoon was removed leaving the catheter in place.  About 600 cc of clear yellow urine came out through the suprapubic catheter after its placement.  The catheter was secured in place using 2-0 silk suture.  Dressing was applied.  The patient was placed in the supine position awakened extubated and taken out to the postanesthesia care unit in stable condition.  I was present to perform the entirety of his procedure.  There were no perioperative or immediate postoperative complications.     Марина Patino MD  7/12/2025  11:53 PM

## 2025-07-13 NOTE — ANESTHESIA POSTPROCEDURE EVALUATION
[facility] Patient Status:  Inpatient   Age/Gender 69 year old male MRN [id number]   Location [facility] 5NW-A Attending Delfino Strange DO   Hosp Day # 1 PCP Candelario Haddad DO       Anesthesia Post-op Note    CYSTOSCOPY , DIEGO CATHETER PLACEMENT    Procedure Summary       Date: 07/12/25 Room / Location:  MAIN OR [address] [place] [address] MAIN OR    Anesthesia Start: 2229 Anesthesia Stop: 07/13/25 0001    Procedure: CYSTOSCOPY , DIEGO CATHETER PLACEMENT Diagnosis:       Urinary retention      (Urinary retention [R33.9])    Surgeons: Марина Patino MD Anesthesiologist: Eron Villalobos DO    Anesthesia Type: general ASA Status: 2 - Emergent            Anesthesia Type: general    Vitals Value Taken Time   /70 07/13/25 00:01   Temp 97.9 07/13/25 00:01   Pulse 80 07/13/25 00:01   Resp 20 07/13/25 00:01   SpO2 80 07/13/25 00:01           Patient Location: PACU    Anesthesia Type: general    Airway Patency: patent and extubated    Postop Pain Control: adequate    Mental Status: mildly sedated but able to meaningfully participate in the post-anesthesia evaluation    Nausea/Vomiting: none    Cardiopulmonary/Hydration status: stable euvolemic    Complications: no apparent anesthesia related complications    Postop vital signs: stable    Dental Exam: Unchanged from Preop    Patient to be discharged from PACU when criteria met.

## 2025-07-13 NOTE — ANESTHESIA PROCEDURE NOTES
Airway  Date/Time: 7/12/2025 10:51 PM  Reason: elective      General Information and Staff   Patient location during procedure: OR  Anesthesiologist: Eron Villalobos DO  Performed: anesthesiologist   Performed by: Eron Villalobos DO  Authorized by: Eron Villalobos DO        Indications and Patient Condition  Indications for airway management: anesthesia  Sedation level: deep      Preoxygenated: yesPatient position: sniffing    Mask difficulty assessment: 1 - vent by mask    Final Airway Details    Final airway type: endotracheal airway    Successful airway: ETT  Cuffed: yes   Successful intubation technique: direct laryngoscopy  Facilitating devices/methods: rapid sequence intubation and cricoid pressure  Endotracheal tube insertion site: oral  Blade: Christie  Blade size: #3  ETT size (mm): 7.5    Cormack-Lehane Classification: grade IIA - partial view of glottis  Placement verified by: capnometry   Measured from: lips  ETT to lips (cm): 24  Number of attempts at approach: 1  Number of other approaches attempted: 0

## 2025-07-13 NOTE — PROGRESS NOTES
Reports he is feeling so much better this morning.  Still sleepy.  Denies  pain and nausea.  Urine output from suprapubic catheter in now clear yellow.  Latest b/p 135/86.

## 2025-07-13 NOTE — CONSULTS
[facility]   part of [facility]    Report of Consultation    Delfin De Leon Patient Status:  Inpatient    10/1/1955 MRN [id number]   Location [facility] 5NW-A Attending Delfino Strange,    Hosp Day # 0 PCP Candelario Haddad DO     Date of Admission:  2025  Date of Consult:  2025   Reason for Consultation:   Urinary retention, suprapubic and lower abdominal pain, nausea and vomiting.    History of Present Illness:   Patient is a 69 year old male who was admitted to the hospital for BPH loc w urin obs/LUTS:  69 year old male (CAD, HTN, DM) with h/o BPH, s/p TURP .  Initially did well but over the last 3-4 months has complained of intractable frequency and urgency.  PVR in ER earlier today in the 400 ml range but has gradually increased to the 800 ml range.  No fever or chills.  No gross hematuria  He received IV zosyn in ER.  Past Medical History  Past Medical History[1]    Past Surgical History  Past Surgical History[2]    Family History  Family History[3]    Social History  Pediatric History   Patient Parents    Not on file     Other Topics Concern    Caffeine Concern No    Exercise No    Seat Belt No    Special Diet No    Stress Concern No    Weight Concern Yes   Social History Narrative    Not on file           Current Medications:  Current Hospital Medications[4]  Prescriptions Prior to Admission[5]    Allergies  Allergies[6]    Review of Systems:    Pertinent items are noted in HPI.    Physical Exam:   Blood pressure (!) 173/104, pulse 102, temperature 98.6 °F (37 °C), temperature source Oral, resp. rate 18, height 6' 1\" (1.854 m), weight 262 lb (118.8 kg), SpO2 96%.    General appearance: alert, appears stated age, and cooperative  Abdominal: soft, non-tender; bowel sounds normal; no masses,  no organomegaly  Male genitalia: normal    Results:     Laboratory Data:  Lab Results   Component Value Date    WBC 5.2 2025    HGB 14.7 2025    HCT 40.3 2025    PLT  140.0 (L) 07/12/2025    CREATSERUM 1.06 07/12/2025    BUN 12 07/12/2025     07/12/2025    K 4.2 07/12/2025     07/12/2025    CO2 26.0 07/12/2025     (H) 07/12/2025    CA 9.4 07/12/2025    ALB 4.7 07/12/2025    ALKPHO 67 07/12/2025    TP 7.0 07/12/2025    AST 36 (H) 07/12/2025    ALT 45 07/12/2025         Imaging:  CT ABDOMEN+PELVIS(CONTRAST ONLY)(CPT=74177)  Result Date: 7/12/2025  CONCLUSION: 1.  No acute abnormality in the abdomen or pelvis. 2.  Colonic diverticulosis. 3.  Mild irregular urinary bladder wall thickening involving the right anterolateral wall. Recommend further evaluation with cystoscopy. Electronically Verified and Signed by Attending Radiologist: Yrn Fernando MD 7/12/2025 2:06 PM Workstation: [id number]         Impression:     BPH loc w urin obs/LUTS  To OR for cysto and hunter catheter placement, possible dilation,  Risks and possible side effects reviewed and he understands and agrees.    Recommendations:  -continue IV zosyn pending final urine culture results.  -To OR for cysto and hunter placement.  Will need to reassess pain level after hunter placement to determine if related to incomplete bladder emptying.  -Bowel regimen to relieve ongoing constipation.      Thank you for allowing me to participate in the care of your patient.    Марина Patino MD  7/12/2025         [1]   Past Medical History:   Arthritis    Cataract    early stages    Coronary atherosclerosis    Depression    Diabetes (HCC)    Diverticulosis of large intestine    Esophageal reflux    Essential hypertension    Hearing impaired person, bilateral    bilateral hearing aids    High blood pressure    High cholesterol    Hx of motion sickness    Hyperlipidemia    Migraines    in the past    PONV (postoperative nausea and vomiting)    Sleep apnea    NO TX   [2]   Past Surgical History:  Procedure Laterality Date    Angioplasty (coronary)  2019    Arthroscopy of joint unlisted      knee x2    Cath bare metal stent  (bms)      Colonoscopy  2022    Hernia surgery      Hip replacement surgery  2022    Other surgical history      elbow wound    Other surgical history      vocal cord    Tonsillectomy  1961    Total hip replacement      Vasectomy  2001   [3]   Family History  Problem Relation Age of Onset    Hypertension Father     Heart Disorder Father         Valve failure    Hypertension Brother     Cancer Brother         Multiple Myeloma    Heart Disorder Mother         Valve failure   [4]   Current Facility-Administered Medications   Medication Dose Route Frequency    atorvastatin (Lipitor) tab 40 mg  40 mg Oral Nightly    finasteride (Proscar) tab 5 mg  5 mg Oral Daily    sertraline (Zoloft) tab 200 mg  200 mg Oral Nightly    zolpidem (Ambien) tab 10 mg  10 mg Oral Nightly PRN    [START ON 7/13/2025] pantoprazole (Protonix) DR tab 40 mg  40 mg Oral QAM AC    heparin (Porcine) 5000 UNIT/ML injection 5,000 Units  5,000 Units Subcutaneous Q8H YEE    acetaminophen (Tylenol) tab 650 mg  650 mg Oral Q4H PRN    Or    HYDROcodone-acetaminophen (Norco) 5-325 MG per tab 1 tablet  1 tablet Oral Q4H PRN    Or    HYDROcodone-acetaminophen (Norco) 5-325 MG per tab 2 tablet  2 tablet Oral Q4H PRN    HYDROmorphone (Dilaudid) 1 MG/ML injection 0.2 mg  0.2 mg Intravenous Q2H PRN    Or    HYDROmorphone (Dilaudid) 1 MG/ML injection 0.4 mg  0.4 mg Intravenous Q2H PRN    Or    HYDROmorphone (Dilaudid) 1 MG/ML injection 0.8 mg  0.8 mg Intravenous Q2H PRN    melatonin tab 3 mg  3 mg Oral Nightly PRN    polyethylene glycol (PEG 3350) (Miralax) 17 g oral packet 17 g  17 g Oral Daily PRN    sennosides (Senokot) tab 17.2 mg  17.2 mg Oral Nightly PRN    bisacodyl (Dulcolax) 10 MG rectal suppository 10 mg  10 mg Rectal Daily PRN    fleet enema (Fleet) rectal enema 133 mL  1 enema Rectal Once PRN    ondansetron (Zofran) 4 MG/2ML injection 4 mg  4 mg Intravenous Q6H PRN    metoclopramide (Reglan) 5 mg/mL injection 10 mg  10 mg Intravenous Q8H PRN     ampicillin-sulbactam (Unasyn) 3 g in sodium chloride 0.9% 100mL IVPB-YAIR  3 g Intravenous Q6H    prochlorperazine (Compazine) tab 10 mg  10 mg Oral Once    insulin aspart (NovoLOG) 100 Units/mL FlexPen 2-10 Units  2-10 Units Subcutaneous TID AC and HS    amLODIPine (Norvasc) tab 5 mg  5 mg Oral Nightly    [START ON 2025] tamsulosin (Flomax) cap 0.8 mg  0.8 mg Oral Daily    tamsulosin (Flomax) cap 0.4 mg  0.4 mg Oral Once    hydrALAzine (Apresoline) 20 mg/mL injection 10 mg  10 mg Intravenous Q4H PRN   [5]   Medications Prior to Admission   Medication Sig    tamsulosin 0.4 MG Oral Cap Take 1 capsule (0.4 mg total) by mouth in the morning. (Patient taking differently: Take 2 capsules (0.8 mg total) by mouth in the morning.)    finasteride 5 MG Oral Tab Take 1 tablet (5 mg total) by mouth daily.    amLODIPine 5 MG Oral Tab Take 1 tablet (5 mg total) by mouth before bedtime.    ascorbic acid 1000 MG Oral Tab Take 1 tablet (1,000 mg total) by mouth in the morning.    atorvastatin 40 MG Oral Tab Take 1 tablet (40 mg total) by mouth nightly.    Cholecalciferol 50 MCG (2000 UT) Oral Tab Take 1 tablet (2,000 Units total) by mouth in the morning.    metFORMIN 500 MG Oral Tab Take 1 tablet (500 mg total) by mouth daily with breakfast.    Multiple Vitamin (DAILY VALUE MULTIVITAMIN) Oral Tab Take 1 tablet by mouth in the morning.    omeprazole 20 MG Oral Capsule Delayed Release Take 1 capsule (20 mg total) by mouth at bedtime.    sertraline 100 MG Oral Tab Take 2 tablets (200 mg total) by mouth at bedtime.    zolpidem 10 MG Oral Tab Take 1 tablet (10 mg total) by mouth nightly as needed.    HYDROcodone-acetaminophen 5-325 MG Oral Tab Take 1 tablet by mouth every 6 (six) hours as needed.    [] phenazopyridine 100 MG Oral Tab Take 1 tablet (100 mg total) by mouth 3 (three) times daily as needed for Pain.    [] levoFLOXacin 250 MG Oral Tab Take 1 tablet (250 mg total) by mouth daily for 10 days.    vitamin B-12  50 MCG Oral Tab Take 1 tablet (50 mcg total) by mouth in the morning.    ibuprofen 400 MG Oral Tab Take 1 tablet (400 mg total) by mouth every 6 (six) hours as needed for Pain.    pyridoxine 100 MG Oral Tab Take 1 tablet (100 mg total) by mouth daily. (Patient not taking: Reported on 6/20/2025)   [6] No Known Allergies

## 2025-07-13 NOTE — PROGRESS NOTES
Pt still with c/o nausea and urinary frequency.  PVR 895cc.  Dr Patino was notofied.  Will consent patient for cystoscopy and hunter catheter placement as ordered.

## 2025-07-13 NOTE — PLAN OF CARE
Problem: Diabetes/Glucose Control  Goal: Glucose maintained within prescribed range  Description: INTERVENTIONS:  - Monitor Blood Glucose as ordered  - Assess for signs and symptoms of hyperglycemia and hypoglycemia  - Administer ordered medications to maintain glucose within target range  - Assess barriers to adequate nutritional intake and initiate nutrition consult as needed  - Instruct patient on self management of diabetes  Outcome: Progressing     Problem: Patient/Family Goals  Goal: Patient/Family Long Term Goal  Description: Patient's Long Term Goal:   Resolution of Urinary retention    Interventions:  -  monitor post void bladder scan results and uipdate urology.  Symptomatic management  - See additional Care Plan goals for specific interventions  Outcome: Progressing  Goal: Patient/Family Short Term Goal  Description: Patient's Short Term Goal:   07/12/2025 - \"manage nausea\"    Interventions:   - Zofran and reglan as ordered  - See additional Care Plan goals for specific interventions  Outcome: Progressing

## 2025-07-13 NOTE — H&P
[facility]      Chief Complaint   Patient presents with    Abdomen/Flank Pain        PCP: Candelario Haddad DO      History of Present Illness: Patient is a 69 year old male with PMH sig for BPH s/p TURP, chronic prostatitis, CAD s/p PCI, T2DM who presents for lower abdominal/penile pain.  Patient with history of severe BPH with LUTS s/p TURP has been experiencing lower abdominal pain he was empirically on Cipro/Flagyl for presumed diverticulitis.  He then developed penile pain, urinary frequency/urgency as well as nausea and vomiting.    CT imaging significant for mildly irregular urinary bladder wall with thickening.  Multiple attempts were made in the ED for Betancourt placement, some bladder decompression was achieved with the pediatric size catheter however unable to advance past the prostate.  Urology consulted.    Past Medical History[1]   Past Surgical History[2]     ALL:  Allergies[3]     Prior to Admission Medications[4]    Social History     Tobacco Use    Smoking status: Never    Smokeless tobacco: Never   Substance Use Topics    Alcohol use: Yes     Alcohol/week: 1.0 standard drink of alcohol     Types: 1 Cans of beer per week     Comment: NAbout 1 beer a month        Fam Hx  Family History[5]    Review of Systems  Comprehensive ROS reviewed and negative except for what is stated in HPI.      OBJECTIVE:  BP (!) 187/97 (BP Location: Left arm)   Pulse 104   Temp 98.2 °F (36.8 °C) (Oral)   Resp 18   Ht 6' 1\" (1.854 m)   Wt 262 lb (118.8 kg)   SpO2 92%   BMI 34.57 kg/m²   Physical Exam:  General: Alert, awake, cooperative.  HEENT:  Normocephalic, atraumatic.  Neck:  Trachea midline.  No JVD.   Chest:  Clear to auscultation bilaterally. No wheezes, rales, or rhonchi.  CV:  Regular rate and rhythm.  Positive S1/S2. No murmur, no gallops, no rubs  GI: Bowel sounds present in all four quadrants, abdomen is soft, non-tender, non-distended.  Extremities:  No lower  extremity edema or cyanosis.  Neurological:  AAOx3. No focal deficits.  Skin:  Warm and dry.        Data Review:    LABS:   Lab Results   Component Value Date    WBC 5.2 07/12/2025    HGB 14.7 07/12/2025    HCT 40.3 07/12/2025    .0 07/12/2025    CREATSERUM 1.06 07/12/2025    BUN 12 07/12/2025     07/12/2025    K 4.2 07/12/2025     07/12/2025    CO2 26.0 07/12/2025     07/12/2025    CA 9.4 07/12/2025    ALB 4.7 07/12/2025    ALKPHO 67 07/12/2025    BILT 0.7 07/12/2025    TP 7.0 07/12/2025    AST 36 07/12/2025    ALT 45 07/12/2025    LIP 37 07/12/2025    PGLU 265 07/13/2025       CT: image personally reviewed.  Irregular bladder with wall thickening.    Radiology: CT ABDOMEN+PELVIS(CONTRAST ONLY)(CPT=74177)  Result Date: 7/12/2025  PROCEDURE: CT ABDOMEN+PELVIS(CONTRAST ONLY)(CPT=74177) INDICATIONS: abd pain/diverticulitis COMPARISON: CT abdomen pelvis 6/20/2025 TECHNIQUE: CT imaging of the abdomen and pelvis was performed with intravenous contrast material. Automated exposure control techniques for dose reduction were used, adjustment of the mA and/or kV were based on patient's size. Use of iterative reconstruction technique for dose reduction was used. Dose information is transmitted to the ACR (American College of Radiology) NRDR (National Radiology Data Registry) which includes the Dose Index Registry. CONTRAST: IOPAMIDOL 76% IV SOLN FOR POWER INJECTOR:100 mL FINDINGS: LIVER: No enlargement, atrophy, abnormal density, or significant focal lesion. BILIARY: No visible dilatation or calcification. PANCREAS: No lesion, fluid collection, ductal dilatation, or atrophy. SPLEEN: No enlargement or focal lesion. KIDNEYS: No significant mass, obstruction, or calcification. ADRENALS: No mass or enlargement. AORTA/VASCULAR: Atherosclerotic disease without aneurysm. RETROPERITONEUM: No mass or enlarged adenopathy. BOWEL/MESENTERY: No dilated bowel or wall thickening. Colonic diverticulosis, without  diverticulitis. Normal appendix. ABDOMINAL WALL: No significant findings. URINARY BLADDER: Mild irregular urinary bladder wall thickening involving the right anterolateral wall. No calculus. PELVIC NODES: No adenopathy. PELVIC ORGANS: No visible mass. Pelvic organs appropriate for patient age. BONES: Degenerative changes of the spine. Left total hip arthroplasty. LUNG BASES: No visible pulmonary or pleural disease. OTHER: Negative.     CONCLUSION: 1.  No acute abnormality in the abdomen or pelvis. 2.  Colonic diverticulosis. 3.  Mild irregular urinary bladder wall thickening involving the right anterolateral wall. Recommend further evaluation with cystoscopy. Electronically Verified and Signed by Attending Radiologist: Yrn Fernando MD 7/12/2025 2:06 PM Workstation: [id number]    CT ABDOMEN+PELVIS(CONTRAST ONLY)(CPT=74177)  Result Date: 6/20/2025  PROCEDURE:  CT ABDOMEN+PELVIS (CONTRAST ONLY) (CPT=74177)  COMPARISON:  [facility], CT, CT UROGRAM(W+WO) W/3D (CPT=74178/88151), 8/13/2024, 4:47 PM.  INDICATIONS:  uti  TECHNIQUE:  CT scanning was performed from the dome of the diaphragm to the pubic symphysis with non-ionic intravenous contrast material. Post contrast coronal MPR imaging was performed.  Dose reduction techniques were used. Dose information is transmitted to the ACR (American College of Radiology) NRDR (National Radiology Data Registry) which includes the Dose Index Registry.  PATIENT STATED HISTORY:(As transcribed by Technologist)  Patient has urinary urgency, frequency, pelvic pressure intermittently,and fatigue for a few months. Patient also reports loose stool for 1.5 months. Patient was treated for UTI in 05-25. Patient states issues have been ongoing since 12/24.   CONTRAST USED:  100cc of Isovue 370  FINDINGS:  LIVER:  No enlargement, atrophy, abnormal density, or significant focal lesion.  BILIARY:  No visible dilatation or calcification.  PANCREAS:  No lesion, fluid collection, ductal dilatation,  or atrophy.  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  KIDNEYS:  No mass, obstruction, or calcification.  ADRENALS:  No mass or enlargement.  AORTA/VASCULAR:  There is mild atherosclerotic disease of the aorta and its branches.  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 RETROPERITONEUM:  No mass or adenopathy.  BOWEL/MESENTERY:  Diverticulosis of the sigmoid colon and descending colon without evidence of diverticulitis.  The appendix is unremarkable.  The visualized small bowel is within normal limits.  There is no abscess or free air. ABDOMINAL WALL:  No mass or hernia.  URINARY BLADDER:  There is asymmetric wall thickening involving the right anterior aspect of the urinary bladder with wall thickness being 9 mm.  There is some stranding of the fat adjacent to the anterior right urinary bladder in the area of wall thickening.  Finding may be due to acute focal cystitis such as infection although neoplastic disease cannot be completely excluded.  Evaluation with cystoscopy could help evaluate. PELVIC NODES:  No adenopathy.  PELVIC ORGANS:  No visible mass.  Pelvic organs appropriate for patient age.  BONES:  No bony lesion or fracture.  Moderate multilevel degenerative changes of the lower thoracic and lumbar spine which is most pronounced at L3-4.  There is multilevel facet joint degenerative changes of the lower lumbar spine. LUNG BASES:  No visible pulmonary or pleural disease.             CONCLUSION:   1. Asymmetric wall thickening involving the right anterior lateral aspect of the urinary bladder this could represent focal infection versus neoplastic disease.  Further evaluation with cystoscopy possible tissue sampling would be recommended.  This area  is not significantly changed compared to 8/13/2024.  2. Diverticulosis of the left colon without diverticulitis.   LOCATION:  [facility]   Dictated by (CST):  Guillermo Garcia MD on 6/20/2025 at 9:43 PM     Finalized by (CST): Guillermo Garcia MD on 6/20/2025 at 9:49 PM          Assessment/Plan:   69 year old male with PMH sig for BPH s/p TURP, chronic prostatitis, CAD s/p PCI, T2DM who presents for lower abdominal/penile pain.    # BPH s/p TURP w/ LUTS  #Acute cystitis suspected  #Acute on chronic prostatitis suspected  - Continue Unasyn  - UA not clearly indicative of infection, however was empirically on antibiotics prior to this.  Follow-up cultures.  - pain control as needed  - Urology consulted    #Hypertension  -Continue amlodipine    #CAD s/p PCI  #Hyperlipidemia  - Continue statin    #TCP, chronic  - Trend CBC    #T2DM  - SSI, Accu-Cheks    Full code  Heparin subcu    Discussed with ED physician    Outpatient records or previous hospital records reviewed.   [facility] hospitalist to continue to follow patient while in house  A total of 78 minutes taken with patient and coordinating care.  Greater than 50% face to face encounter.      Advanced Care Planning    While discussing goals of care with the patient and their family, Delfin voluntarily participated in an advanced care planning discussion. The following was discussed: CODE STATUS.  He wishes to remain full code.    16 minutes were spent in discussing advanced care planning. This time was exclusive of the documented time for this visit.      Delfino Strange [facility] Hospitalist      **Certification      PHYSICIAN Certification of Need for Inpatient Hospitalization - Initial Certification    Patient will require inpatient services that will reasonably be expected to span two midnight's based on the clinical documentation in H+P.   Based on patients current state of illness, I anticipate that, after discharge, patient will require TBD.         [1]   Past Medical History:   Arthritis    Cataract    early stages    Coronary atherosclerosis    Depression    Diabetes (HCC)    Diverticulosis of large intestine     Esophageal reflux    Essential hypertension    Hearing impaired person, bilateral    bilateral hearing aids    High blood pressure    High cholesterol    Hx of motion sickness    Hyperlipidemia    Migraines    in the past    PONV (postoperative nausea and vomiting)    Sleep apnea    NO TX   [2]   Past Surgical History:  Procedure Laterality Date    Angioplasty (coronary)  2019    Arthroscopy of joint unlisted      knee x2    Cath bare metal stent (bms)      Colonoscopy  2022    Hernia surgery      Hip replacement surgery  2022    Other surgical history      elbow wound    Other surgical history      vocal cord    Tonsillectomy  1961    Total hip replacement      Vasectomy  2001   [3] No Known Allergies  [4]   No current outpatient medications on file.   [5]   Family History  Problem Relation Age of Onset    Hypertension Father     Heart Disorder Father         Valve failure    Hypertension Brother     Cancer Brother         Multiple Myeloma    Heart Disorder Mother         Valve failure

## 2025-07-13 NOTE — PROGRESS NOTES
/104, repeat 171/74.  He is due for Norvasc 5mg p.o. but he is so nauseated that he is refusing to take oral medications.  Too early for Zofran and Reglan. Page sent to Dr Ji. Will give Hydralazine 10mg IVP for SBP above 170 as ordered.

## 2025-07-13 NOTE — PROGRESS NOTES
Patient back from OR.  Suprapubic catheter site dressing dry and intact.  Reddish urine noted in the drainage bag.  He is sleepy but opens his eyes when his name is called.

## 2025-07-13 NOTE — ANESTHESIA PREPROCEDURE EVALUATION
PRE-OP EVALUATION    Patient Name: Delfin De Leon    Admit Diagnosis: Acute prostatitis [N41.0]  Intractable vomiting [R11.10]  Abdominal pain, acute [R10.9]  BPH loc w urin obs/LUTS [N40.1]    Pre-op Diagnosis: Urinary retention [R33.9]    CYSTOSCOPY , DIEGO CATHETER PLACEMENT    Anesthesia Procedure: CYSTOSCOPY , DIEGO CATHETER PLACEMENT    Surgeons and Role:     * Марина Patino MD - Primary    Pre-op vitals reviewed.  Temp: 98.6 °F (37 °C)  Pulse: 102  Resp: 18  BP: 173/104  SpO2: 96 %  Body mass index is 34.57 kg/m².    Current medications reviewed.  Hospital Medications:  Current Medications[1]    Outpatient Medications:   Prescriptions Prior to Admission[2]    Allergies: Patient has no known allergies.      Anesthesia Evaluation    Patient summary reviewed.    Anesthetic Complications  (+) history of anesthetic complications  History of: PONV       GI/Hepatic/Renal      (+) GERD                           Cardiovascular                  (+) hypertension                                     Endo/Other      (+) diabetes  type 2,                          Pulmonary                    (+) sleep apnea       Neuro/Psych      (+) depression                                Past Surgical History[3]  Social Hx on file[4]  History   Drug Use Unknown     Available pre-op labs reviewed.  Lab Results   Component Value Date    WBC 5.2 07/12/2025    RBC 4.82 07/12/2025    HGB 14.7 07/12/2025    HCT 40.3 07/12/2025    MCV 83.6 07/12/2025    MCH 30.5 07/12/2025    MCHC 36.5 07/12/2025    RDW 13.8 07/12/2025    .0 (L) 07/12/2025     Lab Results   Component Value Date     07/12/2025    K 4.2 07/12/2025     07/12/2025    CO2 26.0 07/12/2025    BUN 12 07/12/2025    CREATSERUM 1.06 07/12/2025     (H) 07/12/2025    CA 9.4 07/12/2025            Airway      Mallampati: II  Mouth opening: >3 FB  TM distance: 4 - 6 cm  Neck ROM: full Cardiovascular      Rhythm: regular  Rate: normal     Dental    Dentition  appears grossly intact         Pulmonary      Breath sounds clear to auscultation bilaterally.               Other findings              ASA: 2 and emergent  Plan: general  NPO status verified and patient meets guidelines.          Plan/risks discussed with: patient (Risks discussed including nausea, vomiting, dental damage, stroke and heart attack.  Patient understands plan and wishes to proceed.)                Present on Admission:  **None**             [1]    [COMPLETED] sodium chloride 0.9 % IV bolus 1,000 mL  1,000 mL Intravenous Once    [COMPLETED] HYDROmorphone (Dilaudid) 1 MG/ML injection 0.5 mg  0.5 mg Intravenous Once    [COMPLETED] piperacillin-tazobactam (Zosyn) 4.5 g in D5W 100mL IVPB-YAIR  4.5 g Intravenous Once    [COMPLETED] HYDROmorphone (Dilaudid) 1 MG/ML injection 0.5 mg  0.5 mg Intravenous Once    [COMPLETED] ondansetron (Zofran) 4 MG/2ML injection 4 mg  4 mg Intravenous Once    [COMPLETED] HYDROmorphone (Dilaudid) 1 MG/ML injection 0.5 mg  0.5 mg Intravenous Once    [COMPLETED] iopamidol 76% (ISOVUE-370) injection for power injector  100 mL Intravenous ONCE PRN    [COMPLETED] lidocaine (Urojet) 2 % urethral jelly 5 mL  5 mL Urethral Once    [COMPLETED] lidocaine (Urojet) 2 % urethral jelly 5 mL  5 mL Urethral Once    [COMPLETED] metoclopramide (Reglan) 5 mg/mL injection 10 mg  10 mg Intravenous Once    [COMPLETED] diphenhydrAMINE (Benadryl) 50 mg/mL  injection 25 mg  25 mg Intravenous Once    [COMPLETED] diazepam (Valium) 5 mg/mL injection 2.5 mg  2.5 mg Intravenous Once    atorvastatin (Lipitor) tab 40 mg  40 mg Oral Nightly    finasteride (Proscar) tab 5 mg  5 mg Oral Daily    sertraline (Zoloft) tab 200 mg  200 mg Oral Nightly    zolpidem (Ambien) tab 10 mg  10 mg Oral Nightly PRN    [START ON 7/13/2025] pantoprazole (Protonix) DR tab 40 mg  40 mg Oral QAM AC    heparin (Porcine) 5000 UNIT/ML injection 5,000 Units  5,000 Units Subcutaneous Q8H YEE    acetaminophen (Tylenol) tab 650 mg  650 mg  Oral Q4H PRN    Or    HYDROcodone-acetaminophen (Norco) 5-325 MG per tab 1 tablet  1 tablet Oral Q4H PRN    Or    HYDROcodone-acetaminophen (Norco) 5-325 MG per tab 2 tablet  2 tablet Oral Q4H PRN    HYDROmorphone (Dilaudid) 1 MG/ML injection 0.2 mg  0.2 mg Intravenous Q2H PRN    Or    HYDROmorphone (Dilaudid) 1 MG/ML injection 0.4 mg  0.4 mg Intravenous Q2H PRN    Or    HYDROmorphone (Dilaudid) 1 MG/ML injection 0.8 mg  0.8 mg Intravenous Q2H PRN    melatonin tab 3 mg  3 mg Oral Nightly PRN    polyethylene glycol (PEG 3350) (Miralax) 17 g oral packet 17 g  17 g Oral Daily PRN    sennosides (Senokot) tab 17.2 mg  17.2 mg Oral Nightly PRN    bisacodyl (Dulcolax) 10 MG rectal suppository 10 mg  10 mg Rectal Daily PRN    fleet enema (Fleet) rectal enema 133 mL  1 enema Rectal Once PRN    ondansetron (Zofran) 4 MG/2ML injection 4 mg  4 mg Intravenous Q6H PRN    metoclopramide (Reglan) 5 mg/mL injection 10 mg  10 mg Intravenous Q8H PRN    ampicillin-sulbactam (Unasyn) 3 g in sodium chloride 0.9% 100mL IVPB-YAIR  3 g Intravenous Q6H    prochlorperazine (Compazine) tab 10 mg  10 mg Oral Once    [COMPLETED] prochlorperazine (Compazine) 10 MG/2ML injection        insulin aspart (NovoLOG) 100 Units/mL FlexPen 2-10 Units  2-10 Units Subcutaneous TID AC and HS    amLODIPine (Norvasc) tab 5 mg  5 mg Oral Nightly    [START ON 7/13/2025] tamsulosin (Flomax) cap 0.8 mg  0.8 mg Oral Daily    tamsulosin (Flomax) cap 0.4 mg  0.4 mg Oral Once    hydrALAzine (Apresoline) 20 mg/mL injection 10 mg  10 mg Intravenous Q4H PRN   [2]   Medications Prior to Admission   Medication Sig Dispense Refill Last Dose/Taking    tamsulosin 0.4 MG Oral Cap Take 1 capsule (0.4 mg total) by mouth in the morning. (Patient taking differently: Take 2 capsules (0.8 mg total) by mouth in the morning.)   7/12/2025 Morning    finasteride 5 MG Oral Tab Take 1 tablet (5 mg total) by mouth daily. 90 tablet 6 7/11/2025 Bedtime    amLODIPine 5 MG Oral Tab Take 1 tablet  (5 mg total) by mouth before bedtime.   2025    ascorbic acid 1000 MG Oral Tab Take 1 tablet (1,000 mg total) by mouth in the morning.   Past Month    atorvastatin 40 MG Oral Tab Take 1 tablet (40 mg total) by mouth nightly.   2025 Bedtime    Cholecalciferol 50 MCG (2000 UT) Oral Tab Take 1 tablet (2,000 Units total) by mouth in the morning.   2025 Morning    metFORMIN 500 MG Oral Tab Take 1 tablet (500 mg total) by mouth daily with breakfast.   2025 Morning    Multiple Vitamin (DAILY VALUE MULTIVITAMIN) Oral Tab Take 1 tablet by mouth in the morning.   2025 Bedtime    omeprazole 20 MG Oral Capsule Delayed Release Take 1 capsule (20 mg total) by mouth at bedtime.   2025 Bedtime    sertraline 100 MG Oral Tab Take 2 tablets (200 mg total) by mouth at bedtime.   2025 Bedtime    zolpidem 10 MG Oral Tab Take 1 tablet (10 mg total) by mouth nightly as needed.   Past Month    HYDROcodone-acetaminophen 5-325 MG Oral Tab Take 1 tablet by mouth every 6 (six) hours as needed. 8 tablet 0 More than a month    [] phenazopyridine 100 MG Oral Tab Take 1 tablet (100 mg total) by mouth 3 (three) times daily as needed for Pain. 6 tablet 0     [] levoFLOXacin 250 MG Oral Tab Take 1 tablet (250 mg total) by mouth daily for 10 days. 10 tablet 0     vitamin B-12 50 MCG Oral Tab Take 1 tablet (50 mcg total) by mouth in the morning.   More than a month    ibuprofen 400 MG Oral Tab Take 1 tablet (400 mg total) by mouth every 6 (six) hours as needed for Pain.   More than a month    pyridoxine 100 MG Oral Tab Take 1 tablet (100 mg total) by mouth daily. (Patient not taking: Reported on 2025)   More than a month   [3]   Past Surgical History:  Procedure Laterality Date    Angioplasty (coronary)  2019    Arthroscopy of joint unlisted      knee x2    Cath bare metal stent (bms)      Colonoscopy      Hernia surgery      Hip replacement surgery      Other surgical history      elbow  wound    Other surgical history      vocal cord    Tonsillectomy  1961    Total hip replacement      Vasectomy  2001   [4]   Social History  Socioeconomic History    Marital status:    Tobacco Use    Smoking status: Never    Smokeless tobacco: Never   Vaping Use    Vaping status: Never Used   Substance and Sexual Activity    Alcohol use: Yes     Alcohol/week: 1.0 standard drink of alcohol     Types: 1 Cans of beer per week     Comment: NAbout 1 beer a month    Drug use: Never   Other Topics Concern    Caffeine Concern No    Exercise No    Seat Belt No    Special Diet No    Stress Concern No    Weight Concern Yes

## 2025-07-14 ENCOUNTER — HOSPITAL ENCOUNTER (EMERGENCY)
Facility: HOSPITAL | Age: 70
Discharge: HOME | End: 2025-07-14
Attending: EMERGENCY MEDICINE
Payer: COMMERCIAL

## 2025-07-14 VITALS
DIASTOLIC BLOOD PRESSURE: 63 MMHG | BODY MASS INDEX: 35.78 KG/M2 | WEIGHT: 270 LBS | HEIGHT: 73 IN | RESPIRATION RATE: 18 BRPM | HEART RATE: 65 BPM | OXYGEN SATURATION: 95 % | SYSTOLIC BLOOD PRESSURE: 105 MMHG | TEMPERATURE: 97 F

## 2025-07-14 DIAGNOSIS — T83.011A MALFUNCTION OF FOLEY CATHETER, INITIAL ENCOUNTER: Primary | ICD-10-CM

## 2025-07-14 PROCEDURE — 99283 EMERGENCY DEPT VISIT LOW MDM: CPT

## 2025-07-14 PROCEDURE — 99284 EMERGENCY DEPT VISIT MOD MDM: CPT

## 2025-07-14 NOTE — PROGRESS NOTES
NURSING DISCHARGE NOTE    Discharged Home via Wheelchair.  Accompanied by Spouse  Belongings Taken by patient/family.  IV, , and tele removed.  Discharge education, paperwork and supplies provided.  No further questions or concerns.

## 2025-07-14 NOTE — ED INITIAL ASSESSMENT (HPI)
Pt ambulated into the ED with c/o complications with his suprapubic catheter. Pt was discharged at 6pm with a suprapubic catheter and states \"urine is not coming out of the catheter anymore, its coming out of my penis.\"

## 2025-07-14 NOTE — DISCHARGE SUMMARY
[facility] Hospitalist Discharge Summary     Patient ID:  Delfin De Leon  69 year old  10/1/1955    Admission Date/Time  7/12/2025 10:55 AM  Discharge Date  07/13/25    PCP  Candelario Haddad DO     Discharging Hospitalist:  Delfino Strange DO    Disposition:  Home    Follow Up Appointments  Sam Davis MD  [address]  [facility] [address]  [phone]    Follow up        Primary Hospital Problems/Hospital Course Summary  69 year old male with PMH sig for BPH s/p TURP, chronic prostatitis, CAD s/p PCI, T2DM who presents for lower abdominal/penile pain.     # BPH s/p TURP w/ LUTS  #Acute cystitis suspected  #Chronic prostatitis  - UA not clearly indicative of infection, however was empirically on antibiotics prior to this.   - Urology consulted. S/p cystoscopy and suprapubic catheter placement due to bladder neck contracture. Pain and sx improved afterwards.   -s/p unasyn. DC on Bactrim, follow up w. urology    Medication Changes  -Bactrim 800-160 mg x 5 days through 7/18/25    Important Follow Up Items  Labs: NA  Incidental Findings: NA    Procedures/Diagnostics  Cystoscopy/ suprapubic cathter placement 7/12/25    Operative Procedures:   NA    Change in Code Status: Full      Hospital Course/Secondary Diagnoses:      #Hypertension  -Continue amlodipine     #CAD s/p PCI  #Hyperlipidemia  - Continue statin     #TCP, chronic  - stable     #T2DM  - resume hoe meds    Consults: None    Discharge Medications       Medication List        START taking these medications      sulfamethoxazole-trimethoprim -160 MG Tabs per tablet  Commonly known as: Bactrim DS  Take 1 tablet by mouth 2 (two) times daily for 5 days.            CONTINUE taking these medications      amLODIPine 5 MG Tabs  Commonly known as: Norvasc     ascorbic acid 1000 MG Tabs  Commonly known as: VITAMIN C     atorvastatin 40 MG Tabs  Commonly known as:  Lipitor     Cholecalciferol 50 MCG (2000 UT) Tabs     Daily Value Multivitamin Tabs     finasteride 5 MG Tabs  Commonly known as: Proscar  Take 1 tablet (5 mg total) by mouth daily.     HYDROcodone-acetaminophen 5-325 MG Tabs  Commonly known as: Norco  Take 1 tablet by mouth every 6 (six) hours as needed.     ibuprofen 400 MG Tabs  Commonly known as: Motrin     metFORMIN 500 MG Tabs  Commonly known as: Glucophage     omeprazole 20 MG Cpdr  Commonly known as: PriLOSEC     sertraline 100 MG Tabs  Commonly known as: Zoloft     tamsulosin 0.4 MG Caps  Commonly known as: Flomax     vitamin B-12 50 MCG Tabs  Commonly known as: CYANOCOBALAMIN     zolpidem 10 MG Tabs  Commonly known as: Ambien            STOP taking these medications      levoFLOXacin 250 MG Tabs  Commonly known as: Levaquin     phenazopyridine 100 MG Tabs  Commonly known as: Pyridium            ASK your doctor about these medications      pyridoxine 100 MG Tabs  Commonly known as: Vitamin B6               Where to Get Your Medications        These medications were sent to [id number] - [place], [place] - [address] [phone], [phone]  [address] [address]      Phone: [phone]   sulfamethoxazole-trimethoprim -160 MG Tabs per tablet       I reconciled current and discharge medications on the day of discharge.    Imaging/Diagnostic Reports  CT ABDOMEN+PELVIS(CONTRAST ONLY)(CPT=74177)  Result Date: 7/12/2025  PROCEDURE: CT ABDOMEN+PELVIS(CONTRAST ONLY)(CPT=74177) INDICATIONS: abd pain/diverticulitis COMPARISON: CT abdomen pelvis 6/20/2025 TECHNIQUE: CT imaging of the abdomen and pelvis was performed with intravenous contrast material. Automated exposure control techniques for dose reduction were used, adjustment of the mA and/or kV were based on patient's size. Use of iterative reconstruction technique for dose reduction was used. Dose information is transmitted to the ACR (American College of Radiology) NRDR  (National Radiology Data Registry) which includes the Dose Index Registry. CONTRAST: IOPAMIDOL 76% IV SOLN FOR POWER INJECTOR:100 mL FINDINGS: LIVER: No enlargement, atrophy, abnormal density, or significant focal lesion. BILIARY: No visible dilatation or calcification. PANCREAS: No lesion, fluid collection, ductal dilatation, or atrophy. SPLEEN: No enlargement or focal lesion. KIDNEYS: No significant mass, obstruction, or calcification. ADRENALS: No mass or enlargement. AORTA/VASCULAR: Atherosclerotic disease without aneurysm. RETROPERITONEUM: No mass or enlarged adenopathy. BOWEL/MESENTERY: No dilated bowel or wall thickening. Colonic diverticulosis, without diverticulitis. Normal appendix. ABDOMINAL WALL: No significant findings. URINARY BLADDER: Mild irregular urinary bladder wall thickening involving the right anterolateral wall. No calculus. PELVIC NODES: No adenopathy. PELVIC ORGANS: No visible mass. Pelvic organs appropriate for patient age. BONES: Degenerative changes of the spine. Left total hip arthroplasty. LUNG BASES: No visible pulmonary or pleural disease. OTHER: Negative.     CONCLUSION: 1.  No acute abnormality in the abdomen or pelvis. 2.  Colonic diverticulosis. 3.  Mild irregular urinary bladder wall thickening involving the right anterolateral wall. Recommend further evaluation with cystoscopy. Electronically Verified and Signed by Attending Radiologist: Yrn Fernando MD 7/12/2025 2:06 PM Workstation: [id number]        Patient instructions:      I as the attending physician reconciled the current and discharge medications on day of discharge.     Discharge Medication List as of 7/13/2025  5:03 PM        START taking these medications    Details   sulfamethoxazole-trimethoprim -160 MG Oral Tab per tablet Take 1 tablet by mouth 2 (two) times daily for 5 days., Normal, Disp-10 tablet, R-0           CONTINUE these medications which have NOT CHANGED    Details   HYDROcodone-acetaminophen 5-325  MG Oral Tab Take 1 tablet by mouth every 6 (six) hours as needed., Normal, Disp-8 tablet, R-0      tamsulosin 0.4 MG Oral Cap Take 1 capsule (0.4 mg total) by mouth in the morning., Historical      vitamin B-12 50 MCG Oral Tab Take 1 tablet (50 mcg total) by mouth in the morning., Historical      ibuprofen 400 MG Oral Tab Take 1 tablet (400 mg total) by mouth every 6 (six) hours as needed for Pain., Historical      finasteride 5 MG Oral Tab Take 1 tablet (5 mg total) by mouth daily., Normal, Disp-90 tablet, R-6      amLODIPine 5 MG Oral Tab Take 1 tablet (5 mg total) by mouth before bedtime., Historical      ascorbic acid 1000 MG Oral Tab Take 1 tablet (1,000 mg total) by mouth in the morning., Historical      atorvastatin 40 MG Oral Tab Take 1 tablet (40 mg total) by mouth nightly., Historical      Cholecalciferol 50 MCG (2000 UT) Oral Tab Take 1 tablet (2,000 Units total) by mouth in the morning., Historical      metFORMIN 500 MG Oral Tab Take 1 tablet (500 mg total) by mouth daily with breakfast., Historical      Multiple Vitamin (DAILY VALUE MULTIVITAMIN) Oral Tab Take 1 tablet by mouth in the morning., Historical      omeprazole 20 MG Oral Capsule Delayed Release Take 1 capsule (20 mg total) by mouth at bedtime., Historical      pyridoxine 100 MG Oral Tab Take 1 tablet (100 mg total) by mouth daily., Historical      sertraline 100 MG Oral Tab Take 2 tablets (200 mg total) by mouth at bedtime., Historical      zolpidem 10 MG Oral Tab Take 1 tablet (10 mg total) by mouth nightly as needed., Historical           STOP taking these medications       phenazopyridine 100 MG Oral Tab        levoFLOXacin 250 MG Oral Tab                    Exam on day of discharge:     Vitals:    07/13/25 1717   BP:    Pulse: 69   Resp:    Temp:        Physical Exam:   General: no acute distress  Heart: RRR  Lungs: CTAB  Abd: Soft, NT, ND  Neuro: no focal deficits      Total time coordinating care for discharge: 33 minutes    [facility]  Alexandr, DO

## 2025-07-15 ENCOUNTER — ANESTHESIA EVENT (OUTPATIENT)
Dept: SURGERY | Facility: HOSPITAL | Age: 70
End: 2025-07-15
Payer: COMMERCIAL

## 2025-07-15 ENCOUNTER — OFFICE VISIT (OUTPATIENT)
Dept: SURGERY | Facility: CLINIC | Age: 70
End: 2025-07-15

## 2025-07-15 ENCOUNTER — RESULTS FOLLOW-UP (OUTPATIENT)
Dept: MEDSURG UNIT | Facility: HOSPITAL | Age: 70
End: 2025-07-15

## 2025-07-15 ENCOUNTER — TELEPHONE (OUTPATIENT)
Dept: SURGERY | Facility: CLINIC | Age: 70
End: 2025-07-15

## 2025-07-15 ENCOUNTER — EKG ENCOUNTER (OUTPATIENT)
Dept: LAB | Facility: HOSPITAL | Age: 70
End: 2025-07-15
Attending: UROLOGY
Payer: COMMERCIAL

## 2025-07-15 DIAGNOSIS — E66.812 CLASS 2 SEVERE OBESITY DUE TO EXCESS CALORIES WITH SERIOUS COMORBIDITY AND BODY MASS INDEX (BMI) OF 36.0 TO 36.9 IN ADULT (HCC): ICD-10-CM

## 2025-07-15 DIAGNOSIS — M62.89 PELVIC FLOOR TENSION: ICD-10-CM

## 2025-07-15 DIAGNOSIS — E66.01 CLASS 2 SEVERE OBESITY DUE TO EXCESS CALORIES WITH SERIOUS COMORBIDITY AND BODY MASS INDEX (BMI) OF 36.0 TO 36.9 IN ADULT (HCC): ICD-10-CM

## 2025-07-15 DIAGNOSIS — M62.89 PELVIC FLOOR DYSFUNCTION: ICD-10-CM

## 2025-07-15 DIAGNOSIS — R33.9 URINARY RETENTION: ICD-10-CM

## 2025-07-15 DIAGNOSIS — N32.0 BLADDER NECK CONTRACTURE: Primary | ICD-10-CM

## 2025-07-15 DIAGNOSIS — N40.1 BPH WITH OBSTRUCTION/LOWER URINARY TRACT SYMPTOMS: ICD-10-CM

## 2025-07-15 DIAGNOSIS — Z01.818 PRE-OP TESTING: ICD-10-CM

## 2025-07-15 DIAGNOSIS — E11.65 TYPE 2 DIABETES MELLITUS WITH HYPERGLYCEMIA, WITHOUT LONG-TERM CURRENT USE OF INSULIN (HCC): ICD-10-CM

## 2025-07-15 DIAGNOSIS — N13.8 BPH WITH OBSTRUCTION/LOWER URINARY TRACT SYMPTOMS: ICD-10-CM

## 2025-07-15 DIAGNOSIS — N41.0 ACUTE PROSTATITIS: ICD-10-CM

## 2025-07-15 PROBLEM — I25.10 CAD (CORONARY ARTERY DISEASE), NATIVE CORONARY ARTERY: Status: ACTIVE | Noted: 2020-07-16

## 2025-07-15 PROBLEM — G47.10 HYPERSOMNIA WITH SLEEP APNEA: Status: ACTIVE | Noted: 2020-03-26

## 2025-07-15 PROBLEM — N48.89 PENILE PAIN: Status: ACTIVE | Noted: 2025-07-15

## 2025-07-15 PROBLEM — E11.9 DIABETES MELLITUS (HCC): Status: ACTIVE | Noted: 2025-07-15

## 2025-07-15 PROBLEM — G47.30 HYPERSOMNIA WITH SLEEP APNEA: Status: ACTIVE | Noted: 2020-03-26

## 2025-07-15 PROBLEM — K21.9 GASTROESOPHAGEAL REFLUX DISEASE WITHOUT ESOPHAGITIS: Status: ACTIVE | Noted: 2020-03-26

## 2025-07-15 PROBLEM — R35.0 INCREASED FREQUENCY OF URINATION: Status: ACTIVE | Noted: 2025-07-15

## 2025-07-15 PROBLEM — I10 ESSENTIAL HYPERTENSION: Status: ACTIVE | Noted: 2019-08-23

## 2025-07-15 PROBLEM — F32.A DEPRESSION: Status: ACTIVE | Noted: 2019-10-22

## 2025-07-15 LAB
ATRIAL RATE: 82 BPM
P AXIS: 75 DEGREES
P-R INTERVAL: 192 MS
Q-T INTERVAL: 376 MS
QRS DURATION: 88 MS
QTC CALCULATION (BEZET): 439 MS
R AXIS: 54 DEGREES
T AXIS: 15 DEGREES
VENTRICULAR RATE: 82 BPM

## 2025-07-15 PROCEDURE — 99214 OFFICE O/P EST MOD 30 MIN: CPT | Performed by: UROLOGY

## 2025-07-15 PROCEDURE — 93010 ELECTROCARDIOGRAM REPORT: CPT | Performed by: INTERNAL MEDICINE

## 2025-07-15 PROCEDURE — 93005 ELECTROCARDIOGRAM TRACING: CPT

## 2025-07-15 NOTE — TELEPHONE ENCOUNTER
Patient scheduled for 7/16/25 with Dr. Davis at [facility] per Dr. Davis's request    Resubmitted ticket for OR.

## 2025-07-15 NOTE — TELEPHONE ENCOUNTER
Please schedule this patient for surgery. OR is holding a spot for tomorrow at 7 am and will be overnight stay    AMOS Acevedo    Urology Surgery Request  Surgeon: Ryan  Location (if known): [facility]  Procedure: cysto, TUR of BNC, partial TURP   Anesthesia: General   Time Frame: tomorrow at 7 am  Time required: 30 minutes  Diagnosis: Bladder neck contracture    Antibiotics: per hospital protocol unless checked below   ___ Levaquin 500 mg IV   ___ Gemcitabine 2 g/100 mL NS bladder instillation to be given in OR

## 2025-07-15 NOTE — H&P (VIEW-ONLY)
HPI:     Delfin De Leon is a 69 year old male with a PMH of anxiety, HTN, HL, DM, GERD.    Following for:  1. BPH with severe LUTS s/p TURP Dec 2024 with BNC s/p SPT 7/12/25  - did well following TURP until July 2025. Now s/p urgent SP tube placement ([facility])  - s/p TURP 12/19/24: 8.2 g with severe inflammation/prostatitis  - flomax 7/18/24, proscar 9/4/24  2. Chronic prostatitis since ~ 2010  - typically clears with abx  3. AMH  - cysto + bladder bx 8/10/23 ([place]) showing acute on chronic cystitis    PCP - [facility]oo [place])  Prior Urologist - Liliam 8/16/24, Brenda 7/18/24, Lacie CULVER    Presents for check-up. Completed abx for UTI in Mar 2025. Stream got weaker in May and required urgent SPT with Dr Patino a couple days ago. SP tube is draining clear yellow urine.  He is taking reta/pro.    Hip/back pain: L hip pain which has improved.  Diarrhea/constipation: none  Sits ~ 8-10 h per day.  Exercise: none  Snoring: yes - has CPAP but not using and hasn't been evaluated in > 10 y. Would like to repeat.    AUA SS is 14/35 with 3 n; 2 u, I, MARCIANO. Was 25/35 prior to TURP with 5 n, s, w, f, MARCIANO - was 29/35 prior to flomax. Mostly happy with LUTS.  Incontinence: none    PVR was 51, 84, 69, 101 mL    UTI hx: none  Gross hematuria: none  Tobacco hx: none  Kidney stone hx: none  Fam h/o  malignancy: none    Poor potency and prefers observation    PSA 1.08 7/12/24    Reported no water, > 32 soda with medium yellow urine. Now ~ 100 with light yellow urine.    CTU 8/13/24: focal BWT (right lateral)  Cysto today: mod BPH with mod ADONIS. ~ 2 cm papillary erythematous lesion at the bladder dome    Discussed options for bladder neck contracture. He would like to proceed to the OR for cysto, TUR of BNC, partial TURP. We discussed the risks and benefits to the procedure including, but not limited to, bleeding, infection, possible damage to surrounding structures. The patient understands and would like to proceed.    He  will increase water intake and cut back on dietary irritants to help with OAB symptoms. Continue flomax and proscar for BPH/LUTS. PFT ordered for pelvic pain but pt declines right now as he is doing well. Repeat sleep study ordered. OR for cysto, TUR of BNC, partial TURP.    HISTORY:  Past Medical History:    Arthritis    Cataract    early stages    Coronary atherosclerosis    Depression    Diabetes (HCC)    Diverticulosis of large intestine    Esophageal reflux    Essential hypertension    Hearing impaired person, bilateral    bilateral hearing aids    High blood pressure    High cholesterol    Hx of motion sickness    Hyperlipidemia    Migraines    in the past    PONV (postoperative nausea and vomiting)    Sleep apnea    NO TX      Past Surgical History:   Procedure Laterality Date    Angioplasty (coronary)  2019    Arthroscopy of joint unlisted      knee x2    Cath bare metal stent (bms)      Colonoscopy  2022    Hernia surgery      Hip replacement surgery  2022    Other surgical history      elbow wound    Other surgical history      vocal cord    Tonsillectomy  1961    Total hip replacement      Vasectomy  2001      Family History   Problem Relation Age of Onset    Hypertension Father     Heart Disorder Father         Valve failure    Hypertension Brother     Cancer Brother         Multiple Myeloma    Heart Disorder Mother         Valve failure      Social History:   Social History     Socioeconomic History    Marital status:    Tobacco Use    Smoking status: Never    Smokeless tobacco: Never   Vaping Use    Vaping status: Never Used   Substance and Sexual Activity    Alcohol use: Yes     Alcohol/week: 1.0 standard drink of alcohol     Types: 1 Cans of beer per week     Comment: About 1 beer a month    Drug use: Never   Other Topics Concern    Caffeine Concern No    Exercise No    Seat Belt No    Special Diet No    Stress Concern No    Weight Concern Yes     Social Drivers of Health     Food Insecurity:  No Food Insecurity (7/12/2025)    NCSS - Food Insecurity     Worried About Running Out of Food in the Last Year: No     Ran Out of Food in the Last Year: No   Transportation Needs: No Transportation Needs (7/12/2025)    NCSS - Transportation     Lack of Transportation: No   Housing Stability: Not At Risk (7/12/2025)    NCSS - Housing/Utilities     Has Housing: Yes     Worried About Losing Housing: No     Unable to Get Utilities: No        Medications (Active prior to today's visit):  Current Outpatient Medications   Medication Sig Dispense Refill    sulfamethoxazole-trimethoprim -160 MG Oral Tab per tablet Take 1 tablet by mouth 2 (two) times daily for 5 days. 10 tablet 0    HYDROcodone-acetaminophen 5-325 MG Oral Tab Take 1 tablet by mouth every 6 (six) hours as needed. 8 tablet 0    tamsulosin 0.4 MG Oral Cap Take 1 capsule (0.4 mg total) by mouth in the morning. (Patient taking differently: Take 2 capsules (0.8 mg total) by mouth in the morning.)      vitamin B-12 50 MCG Oral Tab Take 1 tablet (50 mcg total) by mouth in the morning.      ibuprofen 400 MG Oral Tab Take 1 tablet (400 mg total) by mouth every 6 (six) hours as needed for Pain.      finasteride 5 MG Oral Tab Take 1 tablet (5 mg total) by mouth daily. 90 tablet 6    amLODIPine 5 MG Oral Tab Take 1 tablet (5 mg total) by mouth before bedtime.      ascorbic acid 1000 MG Oral Tab Take 1 tablet (1,000 mg total) by mouth in the morning.      atorvastatin 40 MG Oral Tab Take 1 tablet (40 mg total) by mouth nightly.      Cholecalciferol 50 MCG (2000 UT) Oral Tab Take 1 tablet (2,000 Units total) by mouth in the morning.      metFORMIN 500 MG Oral Tab Take 1 tablet (500 mg total) by mouth daily with breakfast.      Multiple Vitamin (DAILY VALUE MULTIVITAMIN) Oral Tab Take 1 tablet by mouth in the morning.      omeprazole 20 MG Oral Capsule Delayed Release Take 1 capsule (20 mg total) by mouth at bedtime.      sertraline 100 MG Oral Tab Take 2 tablets  (200 mg total) by mouth at bedtime.      zolpidem 10 MG Oral Tab Take 1 tablet (10 mg total) by mouth nightly as needed.      pyridoxine 100 MG Oral Tab Take 1 tablet (100 mg total) by mouth daily. (Patient not taking: Reported on 7/15/2025)         Allergies:  Allergies   Allergen Reactions    Dilaudid [Hydromorphone] NAUSEA AND VOMITING         ROS:     A comprehensive 10 point review of systems was completed.  Pertinent positives and negatives noted in the the HPI.    PHYSICAL EXAM:     GENERAL APPEARANCE: well, developed, well nourished, in no acute distress  NEUROLOGIC: nonfocal, alert and oriented  HEAD: normocephalic, atraumatic  EYES: sclera non-icteric  EARS: hearing intact  ORAL CAVITY: mucosa moist  NECK/THYROID: no obvious goiter or masses  LUNGS: nonlabored breathing  ABDOMEN: soft, no obvious masses or tenderness  SKIN: no obvious rashes    : as noted above    ASSESSMENT/PLAN:   Diagnoses and all orders for this visit:    Bladder neck contracture  -     Cancel: Surgical Case Request; Future  -     EKG 12 Lead to be performed at [facility]; Future    Pelvic floor dysfunction    BPH with obstruction/lower urinary tract symptoms    Pelvic floor tension    Urinary retention    Acute prostatitis      - as noted above.    Thanks again for this consult.    Sam Davis MD, FACS  Urologist  [facility]  Office: [phone]

## 2025-07-15 NOTE — PAYOR COMM NOTE
--------------  ADMISSION REVIEW     Payor: [employer] OPEN [employer]   Subscriber #:  [id number]  Authorization Number: [id number]    Admit date: 7/12/25  Admit time:  5:22 PM       REVIEW DOCUMENTATION:     ED Provider Notes        ED Provider Notes signed by Germán Montalvo PA-C at 7/12/2025  3:07 PM       Author: Germán Montalvo PA-C Service: Emergency Medicine Author Type: Physician Assistant    Filed: 7/12/2025  3:07 PM Date of Service: 7/12/2025 11:21 AM Status: Signed    : Germán Montalvo PA-C (Physician Assistant) Cosigner: Edvin Gallardo MD at 7/13/2025  6:31 AM           Patient Seen in: [facility] In [place]        History  Chief Complaint   Patient presents with    Abdomen/Flank Pain     Stated Complaint: abd pain/diverticulitis    Subjective:   HPI            69-year-old gentleman.  Patient explains that he has had multiple previous episodes of diverticulitis.  He began to have similar symptoms last week.  He underwent a televisit and was empirically started on ciprofloxacin and Flagyl.  By taking the antibiotic these last 7 days his symptoms have progressed.  He complains of increased abdominal distention and worsened left lower quadrant abdominal pain.  He denies any systemic fever or chills.  No obvious blood or mucus in stools.  No urinary complaints      Objective:     Past Medical History:    Arthritis    Cataract    early stages    Coronary atherosclerosis    Depression    Diabetes (HCC)    Diverticulosis of large intestine    Esophageal reflux    Essential hypertension    Hearing impaired person, bilateral    bilateral hearing aids    High blood pressure    High cholesterol    Hx of motion sickness    Hyperlipidemia    Migraines    in the past    PONV (postoperative nausea and vomiting)    Sleep apnea    NO TX     Physical Exam    ED Triage Vitals   BP 07/12/25 1103 (!) 179/108   Pulse 07/12/25 1101 77   Resp 07/12/25 1101 16   Temp 07/12/25 1101 98.8 °F (37.1 °C)    Temp src --    SpO2 07/12/25 1101 95 %   O2 Device 07/12/25 1101 None (Room air)       Current Vitals:   Vital Signs  BP: (!) 179/103  Pulse: 100  Resp: 16  Temp: 98.8 °F (37.1 °C)    Oxygen Therapy  SpO2: 96 %  O2 Device: None (Room air)      Physical Exam     Gen: Well appearing, well groomed, alert and aware x 3  Neck: Supple, full range of motion, no thyromegaly or lymphadenopathy.  Abdominal: Patient is abdomen is distended.  Moderate pain to palpation in the left lower quadrant and suprapubic region  Back: Full active range of motion.  No CVA tenderness bilaterally.  Lung: No distress, RR, no retraction, breath sounds are clear bilaterally  Cardio: Regular rate and rhythm, normal S1-S2, no murmur appreciable      ED Course  Labs Reviewed   CBC WITH DIFFERENTIAL WITH PLATELET - Abnormal; Notable for the following components:       Result Value    .0 (*)     All other components within normal limits   COMP METABOLIC PANEL (14) - Abnormal; Notable for the following components:    Glucose 189 (*)     AST 36 (*)     All other components within normal limits   URINALYSIS, ROUTINE - Abnormal; Notable for the following components:    Blood Urine Large (*)     Protein Urine Trace (*)     All other components within normal limits   UA MICROSCOPIC ONLY, URINE - Abnormal; Notable for the following components:    RBC Urine >10 (*)     Bacteria Urine Rare (*)     All other components within normal limits   LIPASE - Normal   LACTIC ACID, PLASMA - Normal   RAINBOW DRAW LAVENDER   RAINBOW DRAW LIGHT GREEN   BLOOD CULTURE   BLOOD CULTURE       MDM     CBC, CMP, lipase, blood cultures and lactic acid.  Fluid bolus.  IV Zosyn.  CT of the abdomen pelvis with IV contrast    Admission disposition: 7/12/2025  2:59 PM       CBC demonstrates no leukocytosis.  Platelets 140.    Glucose 189, AST of 36    Lipase within normal limits    Lactic negative    When attempting to provide us with a urinary sample patient endorsed  sensation of needing to urinate but was unable to provide a sample.  He has had multiple previous episodes of urinary retention status post TURP.  Bladder scanner was performed and demonstrated 350 retained.  Nurse attempted to straight cath the patient but this was unsuccessful.  He was then sent for CT.  If no other explanation or etiology is discovered on CT we will again attemptcatheterization with coudé    CT ABDOMEN+PELVIS(CONTRAST ONLY)(CPT=74177) (Final result)  Result time 07/12/25 14:06:27  Final result by Yrn Fernando MD (07/12/25 14:06:27)                Impression:    CONCLUSION:    1.  No acute abnormality in the abdomen or pelvis.  2.  Colonic diverticulosis.  3.  Mild irregular urinary bladder wall thickening involving the right anterolateral wall. Recommend further evaluation with cystoscopy.                Eventually, catheterization was successful and roughly 150 drained.  This did offer some relief of the patient's discomfort.  This was checked for infectious etiologies.    Urinalysis is not suggestive of infectious etiologies    Patient continues to have significant discomfort and episodic vomiting    Case will be discussed with urology and we will plan on admission    Medical Decision Making      Disposition and Plan     Clinical Impression:  1. BPH loc w urin obs/LUTS    2. Abdominal pain, acute    3. Intractable vomiting    4. Acute prostatitis         Disposition:  Admit  7/12/2025  2:59 pm    Hospital Problems       Present on Admission  Date Reviewed: 5/29/2025          ICD-10-CM Noted POA    * (Principal) BPH loc w urin obs/LUTS N40.1 7/12/2025 Unknown         ED Provider Notes signed by Edvin Gallardo MD at 7/12/2025  3:02 PM       Author: Edvin Gallardo MD Service: Emergency Medicine Author Type: Physician    Filed: 7/12/2025  3:02 PM Date of Service: 7/12/2025  2:50 PM Status: Addendum    : Edvin Gallardo MD (Physician)    Related Notes: Original Note by Edvin Gallardo MD  (Physician) filed at 7/12/2025  2:59 PM         I reviewed the chart and discussed the case with the VERONICA.  I provided a substantive portion of care for this patient.  I personally performed the medical decision making for this encounter in conjunction with VERONICA. I have examined the patient and noted diffuse lower abdominal tenderness to palpation, quite uncomfortable with intermittent episodes of nausea, emesis and diaphoresis.  Has been on prophylactic antibiotics for presumed reticulitis for the past few days.  Patient is neurovascularly intact distally.  Urinating a few cc at a time, bladder scanning with about 350 cc.  CT redemonstrating abnormal bladder with no other acute surgical pathology.  Patient requiring numerous doses of pain medicines and nausea medicine.  Numerous attempts at bladder decompression, relieved about 150 cc of urine with pediatric size, unable to advance anything past prostate.  Does not appear consistent with infection.  Patient will be admitted for further workup and care. Consulted with urology. Endorsed to hospitalist.    I agree with the following clinical impression(s):  BPH loc w urin obs/LUTS  (primary encounter diagnosis)  Abdominal pain, acute  Intractable vomiting.    CT ABDOMEN+PELVIS(CONTRAST ONLY)(CPT=74177)  Result Date: 7/12/2025  CONCLUSION: 1.  No acute abnormality in the abdomen or pelvis. 2.  Colonic diverticulosis. 3.  Mild irregular urinary bladder wall thickening involving the right anterolateral wall. Recommend further evaluation with cystoscopy. Electronically Verified and Signed by Attending Radiologist: Yrn Fernando MD 7/12/2025 2:06 PM Workstation: [id number]            7/12 History and Physical           Chief Complaint   Patient presents with    Abdomen/Flank Pain     History of Present Illness: Patient is a 69 year old male with PMH sig for BPH s/p TURP, chronic prostatitis, CAD s/p PCI, T2DM who presents for lower abdominal/penile pain.  Patient with history  of severe BPH with LUTS s/p TURP has been experiencing lower abdominal pain he was empirically on Cipro/Flagyl for presumed diverticulitis.  He then developed penile pain, urinary frequency/urgency as well as nausea and vomiting.     CT imaging significant for mildly irregular urinary bladder wall with thickening.  Multiple attempts were made in the ED for Hunter placement, some bladder decompression was achieved with the pediatric size catheter however unable to advance past the prostate.  Urology consulted.    Assessment/Plan:   69 year old male with PMH sig for BPH s/p TURP, chronic prostatitis, CAD s/p PCI, T2DM who presents for lower abdominal/penile pain.     # BPH s/p TURP w/ LUTS  #Acute cystitis suspected  #Acute on chronic prostatitis suspected  - Continue Unasyn  - UA not clearly indicative of infection, however was empirically on antibiotics prior to this.  Follow-up cultures.  - pain control as needed  - Urology consulted     #Hypertension  -Continue amlodipine     #CAD s/p PCI  #Hyperlipidemia  - Continue statin     #TCP, chronic  - Trend CBC     #T2DM  - SSI, Accu-Cheks     Full code  Heparin subcu        7/12 Urology     Reason for Consultation:   Urinary retention, suprapubic and lower abdominal pain, nausea and vomiting.     History of Present Illness:   Patient is a 69 year old male who was admitted to the hospital for BPH loc w urin obs/LUTS:  69 year old male (CAD, HTN, DM) with h/o BPH, s/p TURP 12/24.  Initially did well but over the last 3-4 months has complained of intractable frequency and urgency.  PVR in ER earlier today in the 400 ml range but has gradually increased to the 800 ml range.  No fever or chills.  No gross hematuria  He received IV zosyn in ER.        Impression:     BPH loc w urin obs/LUTS  To OR for cysto and hunter catheter placement, possible dilation,  Risks and possible side effects reviewed and he understands and agrees.     Recommendations:  -continue IV zosyn pending  final urine culture results.  -To OR for cysto and hunter placement.  Will need to reassess pain level after hunter placement to determine if related to incomplete bladder emptying.  -Bowel regimen to relieve ongoing constipation.            7/12    Preoperative Diagnosis: Urinary retention [R33.9], history of BPH, status post TURP December 2024     Postoperative Diagnosis: Urinary retention, history of BPH, status post TURP December 2024     Bladder neck contracture     Procedure Performed:   Cystoscopy, placement of suprapubic catheter      Surgical Findings: Thick bladder neck contracture.  Opening less than 1 to 2 mm in size.  Contracture very thick and not amenable to dilation using both the [employer] urologist dilator or the [employer] metal dilators.  Moderate amount of clot seen in the prostatic urethra likely secondary to multiple Hunter catheter attempts earlier today.       Drains: 12 [employer] suprapubic catheter     Vitals (last day) before discharge       Date/Time Temp Pulse Resp BP SpO2 Weight O2 Device O2 Flow Rate (L/min) [facility]    07/13/25 1717 -- 69 -- -- 96 % -- -- --     07/13/25 1516 98.4 °F (36.9 °C) 70 18 148/83 98 % -- None (Room air) 0 L/min     07/13/25 1108 98.4 °F (36.9 °C) 74 18 132/78 92 % -- None (Room air) 0 L/min     07/13/25 0933 -- -- -- -- -- -- None (Room air) -- MS    07/13/25 0620 -- 90 -- 135/86 96 % -- -- -- SD    07/13/25 0443 98.2 °F (36.8 °C) 104 18 187/97 92 % -- Nasal cannula 3 L/min LA    07/13/25 0103 97.8 °F (36.6 °C) 86 18 170/97 95 % -- Nasal cannula 3 L/min LA    07/13/25 0044 -- 89 20 183/104 96 % -- Nasal cannula 3 L/min     07/13/25 0030 97.3 °F (36.3 °C) 89 17 172/99 99 % -- Simple mask 8 L/min     07/13/25 0015 -- 84 21 175/103 99 % -- Simple mask 8 L/min     07/13/25 0010 -- 82 21 160/93 99 % -- -- --     07/13/25 0005 -- 76 21 156/88 96 % -- -- --     07/13/25 0000 -- 79 21 112/70 96 % -- -- --     07/12/25 2358 97.1 °F (36.2 °C) 83 23 109/68 96 % --  Simple mask 8 L/min BM    07/12/25 2140 -- -- -- 156/96 -- -- -- -- SD    07/12/25 2030 -- -- -- 171/74 -- -- -- -- SD    07/12/25 2017 98.6 °F (37 °C) 102 18 -- -- -- None (Room air) -- LA    07/12/25 2017 -- -- -- 173/104 -- -- -- -- SD    07/12/25 1825 -- 81 -- -- 96 % -- -- -- AF    07/12/25 1800 98.3 °F (36.8 °C) 99 20 148/68 98 % -- None (Room air) -- TD    07/12/25 1734 -- -- -- -- -- 262 lb (118.8 kg) -- -- NB    07/12/25 1625 98 °F (36.7 °C) 72 20 145/81 97 % -- None (Room air) --     07/12/25 1555 -- 102 21 -- 96 % -- None (Room air) --     07/12/25 1512 -- 99 15 160/100 99 % -- None (Room air) --     07/12/25 1450 -- 100 16 179/103 96 % -- None (Room air) --     07/12/25 1305 -- 73 16 -- 96 % -- None (Room air) --     07/12/25 1203 -- 81 16 168/103 97 % -- None (Room air) --     07/12/25 1103 -- -- -- 179/108 -- -- -- --     07/12/25 1101 98.8 °F (37.1 °C) 77 16 -- 95 % 270 lb (122.5 kg) None (Room air) -- BR           --------------  DISCHARGE REVIEW    Payor: [employer] OPEN ACCESS   Subscriber #:  [id number]  Authorization Number: [id number]    Admit date: 7/12/25  Admit time:   5:22 PM  Discharge Date: 7/13/2025  6:56 PM     Admitting Physician: Delfino Strange DO  Attending Physician:  No att. providers found  Primary Care Physician: Candelario Haddad DO          Discharge Summary Notes        Discharge Summary signed by Delfino Strange DO at 7/14/2025  1:15 AM       Author: Delfino Strange DO Specialty: HOSPITALIST Author Type: Physician    Filed: 7/14/2025  1:15 AM Date of Service: 7/13/2025  6:10 PM Status: Signed    : Delfino Strange DO (Physician)                                                          [facility] Internal Medicine Hospitalist Discharge Summary     Patient ID:  Delfin De Leon  69 year old  10/1/1955    Admission Date/Time  7/12/2025 10:55 AM  Discharge Date  07/13/25    PCP  Candelario Haddad DO     Discharging Hospitalist:  Delfino Strange  DO    Disposition:  Home    Follow Up Appointments  Sam Davis MD  [address]  [facility] [address]  [phone]    Follow up        Primary Hospital Problems/Hospital Course Summary  69 year old male with PMH sig for BPH s/p TURP, chronic prostatitis, CAD s/p PCI, T2DM who presents for lower abdominal/penile pain.     # BPH s/p TURP w/ LUTS  #Acute cystitis suspected  #Chronic prostatitis  - UA not clearly indicative of infection, however was empirically on antibiotics prior to this.   - Urology consulted. S/p cystoscopy and suprapubic catheter placement due to bladder neck contracture. Pain and sx improved afterwards.   -s/p unasyn. DC on Bactrim, follow up w. urology    Medication Changes  -Bactrim 800-160 mg x 5 days through 7/18/25    Important Follow Up Items  Labs: NA  Incidental Findings: NA    Procedures/Diagnostics  Cystoscopy/ suprapubic cathter placement 7/12/25    Operative Procedures:   NA    Change in Code Status: Full      Hospital Course/Secondary Diagnoses:      #Hypertension  -Continue amlodipine     #CAD s/p PCI  #Hyperlipidemia  - Continue statin     #TCP, chronic  - stable     #T2DM  - resume hoe meds    Consults: None    Discharge Medications       Medication List        START taking these medications      sulfamethoxazole-trimethoprim -160 MG Tabs per tablet  Commonly known as: Bactrim DS  Take 1 tablet by mouth 2 (two) times daily for 5 days.            CONTINUE taking these medications      amLODIPine 5 MG Tabs  Commonly known as: Norvasc     ascorbic acid 1000 MG Tabs  Commonly known as: VITAMIN C     atorvastatin 40 MG Tabs  Commonly known as: Lipitor     Cholecalciferol 50 MCG (2000 UT) Tabs     Daily Value Multivitamin Tabs     finasteride 5 MG Tabs  Commonly known as: Proscar  Take 1 tablet (5 mg total) by mouth daily.     HYDROcodone-acetaminophen 5-325 MG Tabs  Commonly known as: Norco  Take 1 tablet by mouth every 6 (six) hours as needed.     ibuprofen 400 MG  Tabs  Commonly known as: Motrin     metFORMIN 500 MG Tabs  Commonly known as: Glucophage     omeprazole 20 MG Cpdr  Commonly known as: PriLOSEC     sertraline 100 MG Tabs  Commonly known as: Zoloft     tamsulosin 0.4 MG Caps  Commonly known as: Flomax     vitamin B-12 50 MCG Tabs  Commonly known as: CYANOCOBALAMIN     zolpidem 10 MG Tabs  Commonly known as: Ambien            STOP taking these medications      levoFLOXacin 250 MG Tabs  Commonly known as: Levaquin     phenazopyridine 100 MG Tabs  Commonly known as: Pyridium            ASK your doctor about these medications      pyridoxine 100 MG Tabs  Commonly known as: Vitamin B6               Where to Get Your Medications        These medications were sent to [id number] - [place], [place] - [address] [phone], [phone]  [address] [address]      Phone: [phone]   sulfamethoxazole-trimethoprim -160 MG Tabs per tablet       I reconciled current and discharge medications on the day of discharge.    Imaging/Diagnostic Reports  CT ABDOMEN+PELVIS(CONTRAST ONLY)(CPT=74177)  Result Date: 7/12/2025  PROCEDURE: CT ABDOMEN+PELVIS(CONTRAST ONLY)(CPT=74177) INDICATIONS: abd pain/diverticulitis COMPARISON: CT abdomen pelvis 6/20/2025 TECHNIQUE: CT imaging of the abdomen and pelvis was performed with intravenous contrast material. Automated exposure control techniques for dose reduction were used, adjustment of the mA and/or kV were based on patient's size. Use of iterative reconstruction technique for dose reduction was used. Dose information is transmitted to the ACR (American College of Radiology) NRDR (National Radiology Data Registry) which includes the Dose Index Registry. CONTRAST: IOPAMIDOL 76% IV SOLN FOR POWER INJECTOR:100 mL FINDINGS: LIVER: No enlargement, atrophy, abnormal density, or significant focal lesion. BILIARY: No visible dilatation or calcification. PANCREAS: No lesion, fluid collection, ductal dilatation, or  atrophy. SPLEEN: No enlargement or focal lesion. KIDNEYS: No significant mass, obstruction, or calcification. ADRENALS: No mass or enlargement. AORTA/VASCULAR: Atherosclerotic disease without aneurysm. RETROPERITONEUM: No mass or enlarged adenopathy. BOWEL/MESENTERY: No dilated bowel or wall thickening. Colonic diverticulosis, without diverticulitis. Normal appendix. ABDOMINAL WALL: No significant findings. URINARY BLADDER: Mild irregular urinary bladder wall thickening involving the right anterolateral wall. No calculus. PELVIC NODES: No adenopathy. PELVIC ORGANS: No visible mass. Pelvic organs appropriate for patient age. BONES: Degenerative changes of the spine. Left total hip arthroplasty. LUNG BASES: No visible pulmonary or pleural disease. OTHER: Negative.     CONCLUSION: 1.  No acute abnormality in the abdomen or pelvis. 2.  Colonic diverticulosis. 3.  Mild irregular urinary bladder wall thickening involving the right anterolateral wall. Recommend further evaluation with cystoscopy. Electronically Verified and Signed by Attending Radiologist: Yrn Fernando MD 7/12/2025 2:06 PM Workstation: [id number]        Patient instructions:      I as the attending physician reconciled the current and discharge medications on day of discharge.     Discharge Medication List as of 7/13/2025  5:03 PM        START taking these medications    Details   sulfamethoxazole-trimethoprim -160 MG Oral Tab per tablet Take 1 tablet by mouth 2 (two) times daily for 5 days., Normal, Disp-10 tablet, R-0           CONTINUE these medications which have NOT CHANGED    Details   HYDROcodone-acetaminophen 5-325 MG Oral Tab Take 1 tablet by mouth every 6 (six) hours as needed., Normal, Disp-8 tablet, R-0      tamsulosin 0.4 MG Oral Cap Take 1 capsule (0.4 mg total) by mouth in the morning., Historical      vitamin B-12 50 MCG Oral Tab Take 1 tablet (50 mcg total) by mouth in the morning., Historical      ibuprofen 400 MG Oral Tab Take 1  tablet (400 mg total) by mouth every 6 (six) hours as needed for Pain., Historical      finasteride 5 MG Oral Tab Take 1 tablet (5 mg total) by mouth daily., Normal, Disp-90 tablet, R-6      amLODIPine 5 MG Oral Tab Take 1 tablet (5 mg total) by mouth before bedtime., Historical      ascorbic acid 1000 MG Oral Tab Take 1 tablet (1,000 mg total) by mouth in the morning., Historical      atorvastatin 40 MG Oral Tab Take 1 tablet (40 mg total) by mouth nightly., Historical      Cholecalciferol 50 MCG (2000 UT) Oral Tab Take 1 tablet (2,000 Units total) by mouth in the morning., Historical      metFORMIN 500 MG Oral Tab Take 1 tablet (500 mg total) by mouth daily with breakfast., Historical      Multiple Vitamin (DAILY VALUE MULTIVITAMIN) Oral Tab Take 1 tablet by mouth in the morning., Historical      omeprazole 20 MG Oral Capsule Delayed Release Take 1 capsule (20 mg total) by mouth at bedtime., Historical      pyridoxine 100 MG Oral Tab Take 1 tablet (100 mg total) by mouth daily., Historical      sertraline 100 MG Oral Tab Take 2 tablets (200 mg total) by mouth at bedtime., Historical      zolpidem 10 MG Oral Tab Take 1 tablet (10 mg total) by mouth nightly as needed., Historical           STOP taking these medications       phenazopyridine 100 MG Oral Tab        levoFLOXacin 250 MG Oral Tab                    Exam on day of discharge:     Vitals:    07/13/25 1717   BP:    Pulse: 69   Resp:    Temp:        Physical Exam:   General: no acute distress  Heart: RRR  Lungs: CTAB  Abd: Soft, NT, ND  Neuro: no focal deficits      Total time coordinating care for discharge: 33 minutes    Delfino Strange DO           Electronically signed by Delfino Strange DO on 7/14/2025  1:15 AM         REVIEWER COMMENTS

## 2025-07-15 NOTE — PROGRESS NOTES
HPI:     Delfin De Leon is a 69 year old male with a PMH of anxiety, HTN, HL, DM, GERD.    Following for:  1. BPH with severe LUTS s/p TURP Dec 2024 with BNC s/p SPT 7/12/25  - did well following TURP until July 2025. Now s/p urgent SP tube placement (Sukumar)  - s/p TURP 12/19/24: 8.2 g with severe inflammation/prostatitis  - flomax 7/18/24, proscar 9/4/24  2. Chronic prostatitis since ~ 2010  - typically clears with abx  3. AMH  - cysto + bladder bx 8/10/23 ([place]) showing acute on chronic cystitis    PCP - Boo [place])  Prior Urologist - Liliam 8/16/24, Brenda 7/18/24, Lacie CULVER    Presents for check-up. Completed abx for UTI in Mar 2025. Stream got weaker in May and required urgent SPT with Dr Patino a couple days ago. SP tube is draining clear yellow urine.  He is taking reta/pro.    Hip/back pain: L hip pain which has improved.  Diarrhea/constipation: none  Sits ~ 8-10 h per day.  Exercise: none  Snoring: yes - has CPAP but not using and hasn't been evaluated in > 10 y. Would like to repeat.    AUA SS is 14/35 with 3 n; 2 u, I, MARCIANO. Was 25/35 prior to TURP with 5 n, s, w, f, MARCIANO - was 29/35 prior to flomax. Mostly happy with LUTS.  Incontinence: none    PVR was 51, 84, 69, 101 mL    UTI hx: none  Gross hematuria: none  Tobacco hx: none  Kidney stone hx: none  Fam h/o  malignancy: none    Poor potency and prefers observation    PSA 1.08 7/12/24    Reported no water, > 32 soda with medium yellow urine. Now ~ 100 with light yellow urine.    CTU 8/13/24: focal BWT (right lateral)  Cysto today: mod BPH with mod ADONIS. ~ 2 cm papillary erythematous lesion at the bladder dome    Discussed options for bladder neck contracture. He would like to proceed to the OR for cysto, TUR of BNC, partial TURP. We discussed the risks and benefits to the procedure including, but not limited to, bleeding, infection, possible damage to surrounding structures. The patient understands and would like to proceed.    He  will increase water intake and cut back on dietary irritants to help with OAB symptoms. Continue flomax and proscar for BPH/LUTS. PFT ordered for pelvic pain but pt declines right now as he is doing well. Repeat sleep study ordered. OR for cysto, TUR of BNC, partial TURP.    HISTORY:  Past Medical History:    Arthritis    Cataract    early stages    Coronary atherosclerosis    Depression    Diabetes (HCC)    Diverticulosis of large intestine    Esophageal reflux    Essential hypertension    Hearing impaired person, bilateral    bilateral hearing aids    High blood pressure    High cholesterol    Hx of motion sickness    Hyperlipidemia    Migraines    in the past    PONV (postoperative nausea and vomiting)    Sleep apnea    NO TX      Past Surgical History:   Procedure Laterality Date    Angioplasty (coronary)  2019    Arthroscopy of joint unlisted      knee x2    Cath bare metal stent (bms)      Colonoscopy  2022    Hernia surgery      Hip replacement surgery  2022    Other surgical history      elbow wound    Other surgical history      vocal cord    Tonsillectomy  1961    Total hip replacement      Vasectomy  2001      Family History   Problem Relation Age of Onset    Hypertension Father     Heart Disorder Father         Valve failure    Hypertension Brother     Cancer Brother         Multiple Myeloma    Heart Disorder Mother         Valve failure      Social History:   Social History     Socioeconomic History    Marital status:    Tobacco Use    Smoking status: Never    Smokeless tobacco: Never   Vaping Use    Vaping status: Never Used   Substance and Sexual Activity    Alcohol use: Yes     Alcohol/week: 1.0 standard drink of alcohol     Types: 1 Cans of beer per week     Comment: About 1 beer a month    Drug use: Never   Other Topics Concern    Caffeine Concern No    Exercise No    Seat Belt No    Special Diet No    Stress Concern No    Weight Concern Yes     Social Drivers of Health     Food Insecurity:  No Food Insecurity (7/12/2025)    NCSS - Food Insecurity     Worried About Running Out of Food in the Last Year: No     Ran Out of Food in the Last Year: No   Transportation Needs: No Transportation Needs (7/12/2025)    NCSS - Transportation     Lack of Transportation: No   Housing Stability: Not At Risk (7/12/2025)    NCSS - Housing/Utilities     Has Housing: Yes     Worried About Losing Housing: No     Unable to Get Utilities: No        Medications (Active prior to today's visit):  Current Outpatient Medications   Medication Sig Dispense Refill    sulfamethoxazole-trimethoprim -160 MG Oral Tab per tablet Take 1 tablet by mouth 2 (two) times daily for 5 days. 10 tablet 0    HYDROcodone-acetaminophen 5-325 MG Oral Tab Take 1 tablet by mouth every 6 (six) hours as needed. 8 tablet 0    tamsulosin 0.4 MG Oral Cap Take 1 capsule (0.4 mg total) by mouth in the morning. (Patient taking differently: Take 2 capsules (0.8 mg total) by mouth in the morning.)      vitamin B-12 50 MCG Oral Tab Take 1 tablet (50 mcg total) by mouth in the morning.      ibuprofen 400 MG Oral Tab Take 1 tablet (400 mg total) by mouth every 6 (six) hours as needed for Pain.      finasteride 5 MG Oral Tab Take 1 tablet (5 mg total) by mouth daily. 90 tablet 6    amLODIPine 5 MG Oral Tab Take 1 tablet (5 mg total) by mouth before bedtime.      ascorbic acid 1000 MG Oral Tab Take 1 tablet (1,000 mg total) by mouth in the morning.      atorvastatin 40 MG Oral Tab Take 1 tablet (40 mg total) by mouth nightly.      Cholecalciferol 50 MCG (2000 UT) Oral Tab Take 1 tablet (2,000 Units total) by mouth in the morning.      metFORMIN 500 MG Oral Tab Take 1 tablet (500 mg total) by mouth daily with breakfast.      Multiple Vitamin (DAILY VALUE MULTIVITAMIN) Oral Tab Take 1 tablet by mouth in the morning.      omeprazole 20 MG Oral Capsule Delayed Release Take 1 capsule (20 mg total) by mouth at bedtime.      sertraline 100 MG Oral Tab Take 2 tablets  (200 mg total) by mouth at bedtime.      zolpidem 10 MG Oral Tab Take 1 tablet (10 mg total) by mouth nightly as needed.      pyridoxine 100 MG Oral Tab Take 1 tablet (100 mg total) by mouth daily. (Patient not taking: Reported on 7/15/2025)         Allergies:  Allergies   Allergen Reactions    Dilaudid [Hydromorphone] NAUSEA AND VOMITING         ROS:     A comprehensive 10 point review of systems was completed.  Pertinent positives and negatives noted in the the HPI.    PHYSICAL EXAM:     GENERAL APPEARANCE: well, developed, well nourished, in no acute distress  NEUROLOGIC: nonfocal, alert and oriented  HEAD: normocephalic, atraumatic  EYES: sclera non-icteric  EARS: hearing intact  ORAL CAVITY: mucosa moist  NECK/THYROID: no obvious goiter or masses  LUNGS: nonlabored breathing  ABDOMEN: soft, no obvious masses or tenderness  SKIN: no obvious rashes    : as noted above    ASSESSMENT/PLAN:   Diagnoses and all orders for this visit:    Bladder neck contracture  -     Cancel: Surgical Case Request; Future  -     EKG 12 Lead to be performed at [facility]; Future    Pelvic floor dysfunction    BPH with obstruction/lower urinary tract symptoms    Pelvic floor tension    Urinary retention    Acute prostatitis      - as noted above.    Thanks again for this consult.    Sam Davis MD, FACS  Urologist  [facility]  Office: [phone]

## 2025-07-16 ENCOUNTER — ANESTHESIA (OUTPATIENT)
Dept: SURGERY | Facility: HOSPITAL | Age: 70
End: 2025-07-16
Payer: COMMERCIAL

## 2025-07-16 ENCOUNTER — HOSPITAL ENCOUNTER (OUTPATIENT)
Facility: HOSPITAL | Age: 70
Discharge: HOME | End: 2025-07-17
Attending: UROLOGY | Admitting: UROLOGY
Payer: COMMERCIAL

## 2025-07-16 ENCOUNTER — APPOINTMENT (OUTPATIENT)
Dept: GENERAL RADIOLOGY | Facility: HOSPITAL | Age: 70
End: 2025-07-16
Attending: UROLOGY
Payer: COMMERCIAL

## 2025-07-16 ENCOUNTER — TELEPHONE (OUTPATIENT)
Dept: SURGERY | Facility: CLINIC | Age: 70
End: 2025-07-16

## 2025-07-16 DIAGNOSIS — N32.0 BLADDER NECK CONTRACTURE: ICD-10-CM

## 2025-07-16 DIAGNOSIS — Z01.818 PRE-OP TESTING: Primary | ICD-10-CM

## 2025-07-16 LAB
GLUCOSE BLD-MCNC: 167 MG/DL (ref 70–99)
GLUCOSE BLD-MCNC: 177 MG/DL (ref 70–99)
GLUCOSE BLD-MCNC: 191 MG/DL (ref 70–99)
GLUCOSE BLD-MCNC: 196 MG/DL (ref 70–99)
GLUCOSE BLD-MCNC: 227 MG/DL (ref 70–99)

## 2025-07-16 PROCEDURE — 52500 TRURL RESECTION BLADDER NECK: CPT | Performed by: UROLOGY

## 2025-07-16 RX ORDER — HYDROMORPHONE HYDROCHLORIDE 1 MG/ML
0.4 INJECTION, SOLUTION INTRAMUSCULAR; INTRAVENOUS; SUBCUTANEOUS EVERY 2 HOUR PRN
Status: DISCONTINUED | OUTPATIENT
Start: 2025-07-16 | End: 2025-07-17

## 2025-07-16 RX ORDER — NALOXONE HYDROCHLORIDE 0.4 MG/ML
80 INJECTION, SOLUTION INTRAMUSCULAR; INTRAVENOUS; SUBCUTANEOUS AS NEEDED
Status: DISCONTINUED | OUTPATIENT
Start: 2025-07-16 | End: 2025-07-16 | Stop reason: HOSPADM

## 2025-07-16 RX ORDER — SODIUM CHLORIDE, SODIUM LACTATE, POTASSIUM CHLORIDE, CALCIUM CHLORIDE 600; 310; 30; 20 MG/100ML; MG/100ML; MG/100ML; MG/100ML
INJECTION, SOLUTION INTRAVENOUS CONTINUOUS
Status: DISCONTINUED | OUTPATIENT
Start: 2025-07-16 | End: 2025-07-16

## 2025-07-16 RX ORDER — ACETAMINOPHEN 500 MG
1000 TABLET ORAL EVERY 8 HOURS SCHEDULED
Status: DISCONTINUED | OUTPATIENT
Start: 2025-07-16 | End: 2025-07-17

## 2025-07-16 RX ORDER — ENOXAPARIN SODIUM 100 MG/ML
40 INJECTION SUBCUTANEOUS DAILY
Status: DISCONTINUED | OUTPATIENT
Start: 2025-07-16 | End: 2025-07-17

## 2025-07-16 RX ORDER — HYDROCODONE BITARTRATE AND ACETAMINOPHEN 5; 325 MG/1; MG/1
1-2 TABLET ORAL EVERY 4 HOURS PRN
Qty: 30 TABLET | Refills: 0 | Status: SHIPPED | OUTPATIENT
Start: 2025-07-16

## 2025-07-16 RX ORDER — ZOLPIDEM TARTRATE 10 MG/1
10 TABLET ORAL NIGHTLY PRN
Status: DISCONTINUED | OUTPATIENT
Start: 2025-07-16 | End: 2025-07-17

## 2025-07-16 RX ORDER — DEXTROSE MONOHYDRATE 25 G/50ML
50 INJECTION, SOLUTION INTRAVENOUS
Status: DISCONTINUED | OUTPATIENT
Start: 2025-07-16 | End: 2025-07-16 | Stop reason: HOSPADM

## 2025-07-16 RX ORDER — HYDROCODONE BITARTRATE AND ACETAMINOPHEN 10; 325 MG/1; MG/1
2 TABLET ORAL ONCE AS NEEDED
Refills: 0 | Status: DISCONTINUED | OUTPATIENT
Start: 2025-07-16 | End: 2025-07-16 | Stop reason: HOSPADM

## 2025-07-16 RX ORDER — ONDANSETRON 2 MG/ML
4 INJECTION INTRAMUSCULAR; INTRAVENOUS EVERY 6 HOURS PRN
Status: DISCONTINUED | OUTPATIENT
Start: 2025-07-16 | End: 2025-07-16 | Stop reason: HOSPADM

## 2025-07-16 RX ORDER — METOCLOPRAMIDE HYDROCHLORIDE 5 MG/ML
10 INJECTION INTRAMUSCULAR; INTRAVENOUS EVERY 8 HOURS PRN
Status: DISCONTINUED | OUTPATIENT
Start: 2025-07-16 | End: 2025-07-16 | Stop reason: HOSPADM

## 2025-07-16 RX ORDER — MEPERIDINE HYDROCHLORIDE 25 MG/ML
12.5 INJECTION INTRAMUSCULAR; INTRAVENOUS; SUBCUTANEOUS AS NEEDED
Refills: 0 | Status: DISCONTINUED | OUTPATIENT
Start: 2025-07-16 | End: 2025-07-16 | Stop reason: HOSPADM

## 2025-07-16 RX ORDER — SODIUM CHLORIDE 9 MG/ML
INJECTION, SOLUTION INTRAVENOUS CONTINUOUS
Status: DISCONTINUED | OUTPATIENT
Start: 2025-07-16 | End: 2025-07-17

## 2025-07-16 RX ORDER — HYDROMORPHONE HYDROCHLORIDE 1 MG/ML
0.2 INJECTION, SOLUTION INTRAMUSCULAR; INTRAVENOUS; SUBCUTANEOUS EVERY 5 MIN PRN
Refills: 0 | Status: DISCONTINUED | OUTPATIENT
Start: 2025-07-16 | End: 2025-07-16 | Stop reason: HOSPADM

## 2025-07-16 RX ORDER — HYDROMORPHONE HYDROCHLORIDE 1 MG/ML
0.4 INJECTION, SOLUTION INTRAMUSCULAR; INTRAVENOUS; SUBCUTANEOUS EVERY 5 MIN PRN
Refills: 0 | Status: DISCONTINUED | OUTPATIENT
Start: 2025-07-16 | End: 2025-07-16 | Stop reason: HOSPADM

## 2025-07-16 RX ORDER — SENNOSIDES 8.6 MG
17.2 TABLET ORAL NIGHTLY PRN
Status: DISCONTINUED | OUTPATIENT
Start: 2025-07-16 | End: 2025-07-17

## 2025-07-16 RX ORDER — LABETALOL HYDROCHLORIDE 5 MG/ML
5 INJECTION, SOLUTION INTRAVENOUS EVERY 5 MIN PRN
Status: DISCONTINUED | OUTPATIENT
Start: 2025-07-16 | End: 2025-07-16 | Stop reason: HOSPADM

## 2025-07-16 RX ORDER — ONDANSETRON 2 MG/ML
4 INJECTION INTRAMUSCULAR; INTRAVENOUS EVERY 6 HOURS PRN
Status: DISCONTINUED | OUTPATIENT
Start: 2025-07-16 | End: 2025-07-17

## 2025-07-16 RX ORDER — CEFAZOLIN SODIUM IN 0.9 % NACL 3 G/100 ML
3 INTRAVENOUS SOLUTION, PIGGYBACK (ML) INTRAVENOUS ONCE
Status: COMPLETED | OUTPATIENT
Start: 2025-07-16 | End: 2025-07-17

## 2025-07-16 RX ORDER — CIPROFLOXACIN 500 MG/1
500 TABLET, FILM COATED ORAL 2 TIMES DAILY
Qty: 14 TABLET | Refills: 0 | Status: SHIPPED | OUTPATIENT
Start: 2025-07-16 | End: 2025-07-23

## 2025-07-16 RX ORDER — OXYCODONE HYDROCHLORIDE 5 MG/1
5 TABLET ORAL EVERY 4 HOURS PRN
Status: DISCONTINUED | OUTPATIENT
Start: 2025-07-16 | End: 2025-07-17

## 2025-07-16 RX ORDER — NICOTINE POLACRILEX 4 MG
15 LOZENGE BUCCAL
Status: DISCONTINUED | OUTPATIENT
Start: 2025-07-16 | End: 2025-07-16 | Stop reason: HOSPADM

## 2025-07-16 RX ORDER — TAMSULOSIN HYDROCHLORIDE 0.4 MG/1
0.8 CAPSULE ORAL NIGHTLY
Status: DISCONTINUED | OUTPATIENT
Start: 2025-07-16 | End: 2025-07-17

## 2025-07-16 RX ORDER — NICOTINE POLACRILEX 4 MG
30 LOZENGE BUCCAL
Status: DISCONTINUED | OUTPATIENT
Start: 2025-07-16 | End: 2025-07-16 | Stop reason: HOSPADM

## 2025-07-16 RX ORDER — AMLODIPINE BESYLATE 5 MG/1
5 TABLET ORAL DAILY
Status: DISCONTINUED | OUTPATIENT
Start: 2025-07-16 | End: 2025-07-17

## 2025-07-16 RX ORDER — BISACODYL 10 MG
10 SUPPOSITORY, RECTAL RECTAL
Status: DISCONTINUED | OUTPATIENT
Start: 2025-07-16 | End: 2025-07-17

## 2025-07-16 RX ORDER — POLYETHYLENE GLYCOL 3350 17 G/17G
17 POWDER, FOR SOLUTION ORAL DAILY PRN
Status: DISCONTINUED | OUTPATIENT
Start: 2025-07-16 | End: 2025-07-17

## 2025-07-16 RX ORDER — DOCUSATE SODIUM 100 MG/1
100 CAPSULE, LIQUID FILLED ORAL 2 TIMES DAILY
Qty: 28 CAPSULE | Refills: 0 | Status: SHIPPED | OUTPATIENT
Start: 2025-07-16 | End: 2025-07-30

## 2025-07-16 RX ORDER — HYDROMORPHONE HYDROCHLORIDE 1 MG/ML
0.8 INJECTION, SOLUTION INTRAMUSCULAR; INTRAVENOUS; SUBCUTANEOUS EVERY 2 HOUR PRN
Status: DISCONTINUED | OUTPATIENT
Start: 2025-07-16 | End: 2025-07-17

## 2025-07-16 RX ORDER — DEXAMETHASONE SODIUM PHOSPHATE 4 MG/ML
VIAL (ML) INJECTION AS NEEDED
Status: DISCONTINUED | OUTPATIENT
Start: 2025-07-16 | End: 2025-07-16 | Stop reason: SURG

## 2025-07-16 RX ORDER — KETOROLAC TROMETHAMINE 30 MG/ML
INJECTION, SOLUTION INTRAMUSCULAR; INTRAVENOUS AS NEEDED
Status: DISCONTINUED | OUTPATIENT
Start: 2025-07-16 | End: 2025-07-16 | Stop reason: SURG

## 2025-07-16 RX ORDER — PHENYLEPHRINE HCL 10 MG/ML
VIAL (ML) INJECTION AS NEEDED
Status: DISCONTINUED | OUTPATIENT
Start: 2025-07-16 | End: 2025-07-16 | Stop reason: SURG

## 2025-07-16 RX ORDER — SODIUM CHLORIDE, SODIUM LACTATE, POTASSIUM CHLORIDE, CALCIUM CHLORIDE 600; 310; 30; 20 MG/100ML; MG/100ML; MG/100ML; MG/100ML
INJECTION, SOLUTION INTRAVENOUS CONTINUOUS
Status: DISCONTINUED | OUTPATIENT
Start: 2025-07-16 | End: 2025-07-16 | Stop reason: HOSPADM

## 2025-07-16 RX ORDER — LIDOCAINE HYDROCHLORIDE 10 MG/ML
INJECTION, SOLUTION EPIDURAL; INFILTRATION; INTRACAUDAL; PERINEURAL AS NEEDED
Status: DISCONTINUED | OUTPATIENT
Start: 2025-07-16 | End: 2025-07-16 | Stop reason: SURG

## 2025-07-16 RX ORDER — HYDROMORPHONE HYDROCHLORIDE 1 MG/ML
0.6 INJECTION, SOLUTION INTRAMUSCULAR; INTRAVENOUS; SUBCUTANEOUS EVERY 5 MIN PRN
Refills: 0 | Status: DISCONTINUED | OUTPATIENT
Start: 2025-07-16 | End: 2025-07-16 | Stop reason: HOSPADM

## 2025-07-16 RX ORDER — FINASTERIDE 5 MG/1
5 TABLET, FILM COATED ORAL DAILY
Status: DISCONTINUED | OUTPATIENT
Start: 2025-07-16 | End: 2025-07-17

## 2025-07-16 RX ORDER — ONDANSETRON 4 MG/1
4 TABLET, ORALLY DISINTEGRATING ORAL EVERY 6 HOURS PRN
Status: DISCONTINUED | OUTPATIENT
Start: 2025-07-16 | End: 2025-07-17

## 2025-07-16 RX ORDER — ONDANSETRON 2 MG/ML
INJECTION INTRAMUSCULAR; INTRAVENOUS AS NEEDED
Status: DISCONTINUED | OUTPATIENT
Start: 2025-07-16 | End: 2025-07-16 | Stop reason: SURG

## 2025-07-16 RX ORDER — PANTOPRAZOLE SODIUM 20 MG/1
20 TABLET, DELAYED RELEASE ORAL
Status: DISCONTINUED | OUTPATIENT
Start: 2025-07-16 | End: 2025-07-17

## 2025-07-16 RX ORDER — EPHEDRINE SULFATE 50 MG/ML
INJECTION INTRAVENOUS AS NEEDED
Status: DISCONTINUED | OUTPATIENT
Start: 2025-07-16 | End: 2025-07-16 | Stop reason: SURG

## 2025-07-16 RX ORDER — ACETAMINOPHEN 500 MG
1000 TABLET ORAL ONCE AS NEEDED
Status: DISCONTINUED | OUTPATIENT
Start: 2025-07-16 | End: 2025-07-16 | Stop reason: HOSPADM

## 2025-07-16 RX ORDER — HYDROCODONE BITARTRATE AND ACETAMINOPHEN 10; 325 MG/1; MG/1
1 TABLET ORAL ONCE AS NEEDED
Refills: 0 | Status: DISCONTINUED | OUTPATIENT
Start: 2025-07-16 | End: 2025-07-16 | Stop reason: HOSPADM

## 2025-07-16 RX ORDER — LIDOCAINE HYDROCHLORIDE 20 MG/ML
JELLY TOPICAL AS NEEDED
Status: DISCONTINUED | OUTPATIENT
Start: 2025-07-16 | End: 2025-07-16 | Stop reason: HOSPADM

## 2025-07-16 RX ORDER — OXYCODONE HYDROCHLORIDE 10 MG/1
10 TABLET ORAL EVERY 4 HOURS PRN
Status: DISCONTINUED | OUTPATIENT
Start: 2025-07-16 | End: 2025-07-17

## 2025-07-16 RX ORDER — MIDAZOLAM HYDROCHLORIDE 1 MG/ML
1 INJECTION INTRAMUSCULAR; INTRAVENOUS EVERY 5 MIN PRN
Status: DISCONTINUED | OUTPATIENT
Start: 2025-07-16 | End: 2025-07-16 | Stop reason: HOSPADM

## 2025-07-16 RX ORDER — ACETAMINOPHEN 500 MG
1000 TABLET ORAL ONCE
Status: DISCONTINUED | OUTPATIENT
Start: 2025-07-16 | End: 2025-07-16 | Stop reason: HOSPADM

## 2025-07-16 RX ORDER — DOXEPIN HYDROCHLORIDE 50 MG/1
1 CAPSULE ORAL DAILY
Status: DISCONTINUED | OUTPATIENT
Start: 2025-07-16 | End: 2025-07-17

## 2025-07-16 RX ADMIN — CEFAZOLIN SODIUM IN 0.9 % NACL 3 G: 3 G/100 ML INTRAVENOUS SOLUTION, PIGGYBACK (ML) INTRAVENOUS at 07:08:00

## 2025-07-16 RX ADMIN — LIDOCAINE HYDROCHLORIDE 50 MG: 10 INJECTION, SOLUTION EPIDURAL; INFILTRATION; INTRACAUDAL; PERINEURAL at 07:06:00

## 2025-07-16 RX ADMIN — PHENYLEPHRINE HCL 100 MCG: 10 MG/ML VIAL (ML) INJECTION at 07:16:00

## 2025-07-16 RX ADMIN — SODIUM CHLORIDE, SODIUM LACTATE, POTASSIUM CHLORIDE, CALCIUM CHLORIDE: 600; 310; 30; 20 INJECTION, SOLUTION INTRAVENOUS at 08:14:00

## 2025-07-16 RX ADMIN — PHENYLEPHRINE HCL 100 MCG: 10 MG/ML VIAL (ML) INJECTION at 07:33:00

## 2025-07-16 RX ADMIN — EPHEDRINE SULFATE 10 MG: 50 INJECTION INTRAVENOUS at 07:19:00

## 2025-07-16 RX ADMIN — DEXAMETHASONE SODIUM PHOSPHATE 4 MG: 4 MG/ML VIAL (ML) INJECTION at 07:06:00

## 2025-07-16 RX ADMIN — ONDANSETRON 4 MG: 2 INJECTION INTRAMUSCULAR; INTRAVENOUS at 07:06:00

## 2025-07-16 RX ADMIN — KETOROLAC TROMETHAMINE 30 MG: 30 INJECTION, SOLUTION INTRAMUSCULAR; INTRAVENOUS at 07:50:00

## 2025-07-16 NOTE — ANESTHESIA POSTPROCEDURE EVALUATION
[facility] Patient Status:  Outpatient in a Bed   Age/Gender 69 year old male MRN [id number]   Location [facility] SURGERY Attending Sam Davis MD   Hosp Day # 0 PCP Candelario Haddad DO       Anesthesia Post-op Note    cystoscopy, transurethral resection of bladder neck contracture, partial transurethral resection of prostate    Procedure Summary       Date: 07/16/25 Room / Location:  MAIN OR  /  MAIN OR    Anesthesia Start: 0701 Anesthesia Stop: 0814    Procedure: cystoscopy, transurethral resection of bladder neck contracture, partial transurethral resection of prostate Diagnosis:       Bladder neck contracture      (Bladder neck contracture [N32.0])    Surgeons: Sam Davis MD Anesthesiologist: Sid Calderon MD    Anesthesia Type: general ASA Status: 3            Anesthesia Type: general    Vitals Value Taken Time   /84 07/16/25 08:14   Temp 97.3 °F (36.3 °C) 07/16/25 08:14   Pulse 78 07/16/25 08:14   Resp 16 07/16/25 08:14   SpO2 93 % 07/16/25 08:14           Patient Location: PACU    Anesthesia Type: general    Airway Patency: patent and extubated    Postop Pain Control: adequate    Mental Status: mildly sedated but able to meaningfully participate in the post-anesthesia evaluation    Nausea/Vomiting: none    Cardiopulmonary/Hydration status: stable euvolemic    Complications: no apparent anesthesia related complications    Postop vital signs: stable    Dental Exam: Unchanged from Preop    Patient to be discharged from PACU when criteria met.

## 2025-07-16 NOTE — INTERVAL H&P NOTE
Pre-op Diagnosis: Bladder neck contracture [N32.0]    The above referenced H&P was reviewed by Sam Davis MD on 7/16/2025, the patient was examined and no significant changes have occurred in the patient's condition since the H&P was performed.  I discussed with the patient and/or legal representative the potential benefits, risks and side effects of this procedure; the likelihood of the patient achieving goals; and potential problems that might occur during recuperation.  I discussed reasonable alternatives to the procedure, including risks, benefits and side effects related to the alternatives and risks related to not receiving this procedure.  We will proceed with procedure as planned.

## 2025-07-16 NOTE — OPERATIVE REPORT
Urology Operative Note    Attending Surgeon: Sam Davis MD    Patient Name: Delfin De Leon    Date of Surgery: 7/16/2025    Preoperative Diagnosis: BPH with severe LUTS    Postoperative Diagnosis: same    Procedure Performed: Cystoscopy, transurethral resection of bladder neck contracture, partial transurethral resection of the prostate    Indication for procedure:  Patient is a 69 year old male who presented with urinary retention due to bladder neck contracture. He underwent emergent SP tube placement by my partner last week. He was counseled on options and elected to undergo the aforementioned procedure. We discussed the risks and benefits to surgery. We discussed risks including, but not limited to, bleeding, infection, possible damage to surrounding structures. The patient understood these risks and wished to proceed with surgery.    Description of the procedure:  The patient was taken to the operating room and prepped and draped in lithotomy position after undergoing general anesthesia.   The cystoscope was inserted. He was again noted to have a bladder neck contracture and a small amount of residual prostate tissue. We then proceeded with transurethral resection of the bladder neck contracture and the prostate. I dissected down to the prostatic capsule on all sides and fulgurated oozing vessels. The prostatic urethra and bladder neck were wide open at the end of the case. There was no significant bleeding noted at the end of the case. The TURP chips and bladder neck contracture chips were removed. I inserted a 22-Maldivian 3 way Hunter catheter. The urine was clear on a low rate of bladder irrigation. The suprapubic hunter was removed.  The patient was awoken having tolerated the procedure well.    Specimen: bladder neck contracture and TURP chips    Complications: No known complications    Condition on Discharge from the operating room was stable    Plan: The patient will be admitted overnight and undergo  a voiding trial in a few days    Sam Davis MD  Date: 7/16/2025  Time: 8:12 AM

## 2025-07-16 NOTE — ANESTHESIA PREPROCEDURE EVALUATION
PRE-OP EVALUATION    Patient Name: Delfin De Leon    Admit Diagnosis: Bladder neck contracture [N32.0]    Pre-op Diagnosis: Bladder neck contracture [N32.0]    cystoscopy, transurethral resection of bladder neck contracture, partial transurethral resection of prostate    Anesthesia Procedure: cystoscopy, transurethral resection of bladder neck contracture, partial transurethral resection of prostate    Surgeons and Role:     * Sam Davis MD - Primary    Pre-op vitals reviewed.        Body mass index is 35.62 kg/m².    Current medications reviewed.  Hospital Medications:  Current Medications[1]    Outpatient Medications:   Prescriptions Prior to Admission[2]    Allergies: Dilaudid [hydromorphone]      Anesthesia Evaluation    Patient summary reviewed.    Anesthetic Complications  (+) history of anesthetic complications  History of: PONV       GI/Hepatic/Renal      (+) GERD                           Cardiovascular                (+) obesity  (+) hypertension     (+) CAD                                Endo/Other      (+) diabetes  type 2, not using insulin                         Pulmonary                    (+) sleep apnea       Neuro/Psych      (+) depression                                Past Surgical History[3]  Social Hx on file[4]  History   Drug Use Unknown     Available pre-op labs reviewed.  Lab Results   Component Value Date    WBC 7.2 07/13/2025    RBC 4.64 07/13/2025    HGB 13.6 07/13/2025    HCT 38.6 (L) 07/13/2025    MCV 83.2 07/13/2025    MCH 29.3 07/13/2025    MCHC 35.2 07/13/2025    RDW 13.9 07/13/2025    .0 07/13/2025     Lab Results   Component Value Date     07/13/2025    K 3.8 07/13/2025     07/13/2025    CO2 27.0 07/13/2025    BUN 11 07/13/2025    CREATSERUM 1.16 07/13/2025     (H) 07/13/2025    CA 8.9 07/13/2025            Airway      Mallampati: I  Mouth opening: >3 FB  TM distance: > 6 cm  Neck ROM: full Cardiovascular    Cardiovascular exam normal.          Dental    Dentition appears grossly intact         Pulmonary    Pulmonary exam normal.                 Other findings              ASA: 3   Plan: general  NPO status verified and           Plan/risks discussed with: patient                Present on Admission:  **None**             [1]   No current facility-administered medications on file as of .   [2]   No medications prior to admission.   [3]   Past Surgical History:  Procedure Laterality Date    Angioplasty (coronary)  2019    Arthroscopy of joint unlisted      knee x2    Cath bare metal stent (bms)      Colonoscopy  2022    Hernia surgery      Hip replacement surgery  2022    Other surgical history      elbow wound    Other surgical history      vocal cord    Tonsillectomy  1961    Total hip replacement      Vasectomy  2001   [4]   Social History  Socioeconomic History    Marital status:    Tobacco Use    Smoking status: Never    Smokeless tobacco: Never   Vaping Use    Vaping status: Never Used   Substance and Sexual Activity    Alcohol use: Yes     Alcohol/week: 1.0 standard drink of alcohol     Types: 1 Cans of beer per week     Comment: SOCIALLY-About 1 beer a month    Drug use: Never   Other Topics Concern    Caffeine Concern No    Exercise No    Seat Belt No    Special Diet No    Stress Concern No    Weight Concern Yes

## 2025-07-16 NOTE — TELEPHONE ENCOUNTER
Please schedule patient for a voiding trial next Monday morning and should have a follow-up with a PA either later that week or early the next week for checkup with PVR and discuss possible 2-3 times weekly CIC.

## 2025-07-16 NOTE — ED QUICK NOTES
Call received from microbiology-  Gram positive rods- only in one bottle  PCR- no target detected.  Discussed with Dr. Powell.

## 2025-07-16 NOTE — DISCHARGE INSTRUCTIONS
You had a procedure called a TURP today    - No heavy lifting or strenuous activity for 2-3 WEEKS.    - You may have a post-operative appointment that is already scheduled for you. If the appointment date or time does not work with your schedule please call our office to reschedule ([phone]). Alternatively our office may be reaching out to you to schedule the appointment.     - You may experience pain after the procedure for 1-2 days.  If pain becomes intolerable please contact our office or go to the nearest Emergency Room/Immediate Care. You make take over-the counter ibuprofen for mild pain (provided you do not have a medical condition such as stomach ulcers or kidney disease which prohibits you from taking). You may take this in addition to tylenol or narcotic pain medication. Hot packs may help for discomfort as well.    - If you take blood thinners (such as aspirin or plavix) please DO NOT take them when you go home. You may resume these medications in 4 WEEKS.    - If you are medically allowed to take ibuprofen then you may take 200-400 mg every 8 hours as needed for pain with plenty of fluids. You may take this in addition to tylenol or other pain medication which may be prescribed.     - You may experience burning with urination and frequency of urination over the next few days.     - You may see blood in your urine that should clear up within ~ 6 weeks.     - Try to abstain from alcohol, coffee, tea, artificial sweeteners, and spicy food for the next 48 hours as these can irritate the bladder.     - If you develop a fever, chills, difficulty urinating or abdominal pain in the next 24 hours, call the office.     - Try to drink at least 1.5 to 2 liters of fluid per day to stay hydrated, water is preferable. If you are on a fluid restriction due to other medical reasons then you need to adhere to your fluid restriction recommendations.

## 2025-07-16 NOTE — ANESTHESIA PROCEDURE NOTES
Airway  Date/Time: 7/16/2025 7:06 AM  Reason: elective      General Information and Staff   Patient location during procedure: OR  Anesthesiologist: Sid Calderon MD  Performed: anesthesiologist   Performed by: Sid Calderon MD  Authorized by: Sid Calderon MD        Indications and Patient Condition  Indications for airway management: anesthesia  Sedation level: deep      Preoxygenated: yesPatient position: sniffing    Mask difficulty assessment: 1 - vent by mask    Final Airway Details    Final airway type: supraglottic airway      Successful airway: classic  Size: 4     Number of attempts at approach: 1

## 2025-07-17 VITALS
HEART RATE: 63 BPM | TEMPERATURE: 98 F | WEIGHT: 263.44 LBS | BODY MASS INDEX: 34.91 KG/M2 | HEIGHT: 73 IN | DIASTOLIC BLOOD PRESSURE: 65 MMHG | OXYGEN SATURATION: 99 % | RESPIRATION RATE: 20 BRPM | SYSTOLIC BLOOD PRESSURE: 133 MMHG

## 2025-07-17 PROBLEM — Z01.818 PRE-OP TESTING: Status: ACTIVE | Noted: 2025-07-17

## 2025-07-17 LAB
GLUCOSE BLD-MCNC: 181 MG/DL (ref 70–99)
GLUCOSE BLD-MCNC: 202 MG/DL (ref 70–99)

## 2025-07-17 PROCEDURE — 99238 HOSP IP/OBS DSCHRG MGMT 30/<: CPT

## 2025-07-17 NOTE — CM/SW NOTE
07/17/25 1400   Discharge disposition   Expected discharge disposition Home or Self   Post Acute Care Provider Home   Discharge transportation Private car       CM reviewed pt's chart for discharge planning.     Patient is a 69 year old male admitted s/p cystoscopy, transurethral resection of bladder neck contracture, partial transurethral resection of prostate. Per documentation pt is doing well. Tolerating PO intake and ambulating. Plan to discharge with hunter in place noted.     Anticipate discharge home later today.    No discharge needs identified at this time.     SW/CM to remain available for support and/or discharge planning.    Brenda Reveles, BSN RN, CMSRN  RN Case Manager

## 2025-07-17 NOTE — TELEPHONE ENCOUNTER
Spoke with pt and appt's scheduled   Future Appointments   Date Time Provider Department Center   7/21/2025  8:00 AM [facility] UROLOGY NURSE [id number] [facility]   7/21/2025  1:00 PM [facility] UROLOGY NURSE [id number] [facility]   7/24/2025 11:00 AM Christina CatieSPRING [id number] [facility]   8/22/2025  9:15 AM IjeomaAloken, PT EH PHYS [facility]   8/29/2025  2:45 PM Ijeoma, Jazmín, PT EH PHYS [facility]   9/12/2025  2:45 PM Ijeoma, Jazmín, PT EH PHYS [facility]   9/19/2025  2:45 PM Ijeoma, Jazmín, PT EH PHYS [facility]   9/26/2025  2:45 PM Ijeoma, Jazmín, PT EH PHYS [facility]   10/3/2025  2:45 PM Ijeoma, Jazmín, PT EH PHYS [facility]   10/10/2025  2:45 PM Ijeoma, Jazmín, PT EH PHYS [facility]   10/15/2025 10:00 AM Sam Davis MD [id number] [facility]   10/17/2025  2:45 PM Ijeoma, Jazmín, PT EH PHYS [facility]   10/24/2025  2:45 PM Ijeoma Jazmín, PT EH PHYS [facility]   10/31/2025  2:45 PM IjeomaYahirJazmín, PT EH PHYS [facility]     This encounter is now closed

## 2025-07-17 NOTE — PROGRESS NOTES
NURSING ADMISSION NOTE      Patient admitted via Cart  Oriented to room.  Safety precautions initiated.  Bed in low position.  Call light in reach.  Alert and Oriented x 4.  VSS Afebrile.  O2 Sat WNL on Room air. Telemetry = NSR    Denies nausea or emesis.  Started on clear liquids per orders.  States pain controlled at this time.  IVF continues per orders.  CBI running at slow rate.  Betancourt intact and draining large amounts of pink/red clear urine output.  Patient assisted up to bathroom and tolerated it well, but no BM noted.  All questions and concerns addressed.  All safety measures in place.  Patient states he does not use CPAP at home.

## 2025-07-17 NOTE — PLAN OF CARE
Received pt in bed, aox4, follows commands. Denies pain. CBI clamped per order this morning. Urine without clots, color between pink and red. Urology notified. Denies discomfort or pressure. Tolerating diet. Up to chair. Plan to discharge this afternoon.

## 2025-07-17 NOTE — PLAN OF CARE
Received pt at 1930. Pt is A&O x 4; follows commands, POD 0 cystoscopy/TURP . Pt is on RA, VSS, tolerating low fiber/soft diet. Pt has CBI running at slow rate with pink output. Pt's pain is mild and ambulates with standby assist. IV access is patent now SL. Shift assessment performed, HS meds given. NOC safety plan in place; bed in low position, call light and personal items within reach, pt encouraged to call staff for assistance.    POC:  Clamping trial  Discharge home when cleared.

## 2025-07-17 NOTE — PROGRESS NOTES
[facility]   part of [facility]    Progress Note    Delfin De Leon Patient Status:  Outpatient in a Bed    10/1/1955 MRN [id number]   Location [facility] 3NW-A Attending Sam Davis MD   Hosp Day # 0 PCP Candelario Haddad,      Subjective:   Delfin De Leon is a(n) 69 year old male.    Patient doing well overall, no acute concerns today  Afebrile, VSS  Day 1 post-op transurethral resection of bladder neck contracture, partial transurethral resection of prostate  750mL urine output in last 24hrs  No new labs today    Objective:   Blood pressure 151/81, pulse 63, temperature 98.1 °F (36.7 °C), temperature source Oral, resp. rate 21, height 6' 1\" (1.854 m), weight 263 lb 7.2 oz (119.5 kg), SpO2 98%.    General appearance: alert, appears stated age, and cooperative  Head: Normocephalic, without obvious abnormality, atraumatic  Neck: supple, symmetrical, trachea midline  Pulmonary:  unlabored respirations  Abdominal: normal findings: soft, non-tender  : IFC in place, clear/light pink urine in tubing    Results:   Lab Results   Component Value Date    WBC 7.2 2025    HGB 13.6 2025    HCT 38.6 (L) 2025    .0 2025    CREATSERUM 1.16 2025    BUN 11 2025     2025    K 3.8 2025     2025    CO2 27.0 2025     (H) 2025    CA 8.9 2025    ALB 4.7 2025    ALKPHO 67 2025    BILT 0.7 2025    TP 7.0 2025    AST 36 (H) 2025    ALT 45 2025    TSH 2.305 2025    LIP 37 2025       No results found.  EKG 12 Lead  Result Date: 7/15/2025  Normal sinus rhythm Poor R wave progression Cannot rule out Anterior infarct , age undetermined Otherwise normal ECG When compared with ECG of 06-DEC-2024 12:47, Premature atrial complexes are no longer Present RI interval has decreased Confirmed by FELY PETERSEN, LIA ([id number]) on 7/15/2025 5:26:24 PM      Assessment & Plan:   BPH with  severe LUTS  - hunter balloon slightly deflated by this NP at 1000 this morning (removed 10cc, left 20cc remaining in balloon), pt to discharge home with hunter catheter  - voiding trial next Monday in our office as scheduled  - discharge home this afternoon, ok to drive home      SPRING Carrillo  [facility] Urology  7/17/2025

## 2025-07-17 NOTE — DISCHARGE SUMMARY
[facility]   part of [facility]    Discharge Summary    Delfin De Leon Patient Status:  Outpatient in a Bed    10/1/1955 MRN [id number]   Location [facility] 3NW-A Attending Sam Davis MD   Hosp Day # 0 PCP Candelario Haddad DO     Date of Admission: 2025 Disposition: Home or Self Care     Date of Discharge: No discharge date for patient encounter.    Admitting Diagnosis: Bladder neck contracture [N32.0]    Discharge Diagnosis: Problem List[1]    Reason for Admission: BPH with severe LUTS     Physical Exam: see progress note dated 25    History of Present Illness: Patient is a 69 year old male who presented with urinary retention due to bladder neck contracture. He underwent emergent SP tube placement by my partner last week. He was counseled on options and elected to undergo the aforementioned procedure. We discussed the risks and benefits to surgery. We discussed risks including, but not limited to, bleeding, infection, possible damage to surrounding structures. The patient understood these risks and wished to proceed with surgery.    Hospital Course: see progress note dated 25    Consultations: hospitalist for medical mgmt    Procedures:  Cystoscopy, transurethral resection of bladder neck contracture, partial transurethral resection of the prostate     Complications: no known complications    Discharge Condition: Stable         Discharge Medications:      Discharge Medications        START taking these medications        Instructions Prescription details   ciprofloxacin 500 MG Tabs  Commonly known as: Cipro      Take 1 tablet (500 mg total) by mouth 2 (two) times daily for 7 days.   Stop taking on: 2025  Quantity: 14 tablet  Refills: 0     docusate sodium 100 MG Caps  Commonly known as: Colace      Take 1 capsule (100 mg total) by mouth 2 (two) times daily for 14 days. Stop taking if loose stools/diarrhea.   Stop taking on: 2025  Quantity: 28  capsule  Refills: 0     HYDROcodone-acetaminophen 5-325 MG Tabs  Commonly known as: Norco      Take 1-2 tablets by mouth every 4 (four) hours as needed for Pain.   Quantity: 30 tablet  Refills: 0            CONTINUE taking these medications        Instructions Prescription details   amLODIPine 5 MG Tabs  Commonly known as: Norvasc      Take 1 tablet (5 mg total) by mouth before bedtime.   Refills: 0     ascorbic acid 1000 MG Tabs  Commonly known as: VITAMIN C      Take 1 tablet (1,000 mg total) by mouth in the morning.   Refills: 0     atorvastatin 40 MG Tabs  Commonly known as: Lipitor      Take 1 tablet (40 mg total) by mouth nightly.   Refills: 0     Cholecalciferol 50 MCG (2000 UT) Tabs      Take 1 tablet (2,000 Units total) by mouth in the morning.   Refills: 0     Daily Value Multivitamin Tabs      Take 1 tablet by mouth in the morning.   Refills: 0     finasteride 5 MG Tabs  Commonly known as: Proscar      Take 1 tablet (5 mg total) by mouth daily.   Quantity: 90 tablet  Refills: 6     metFORMIN 500 MG Tabs  Commonly known as: Glucophage      Take 1 tablet (500 mg total) by mouth 2 (two) times daily with meals.   Refills: 0     omeprazole 20 MG Cpdr  Commonly known as: PriLOSEC      Take 1 capsule (20 mg total) by mouth at bedtime.   Refills: 0     pyridoxine 100 MG Tabs  Commonly known as: Vitamin B6      Take 1 tablet (100 mg total) by mouth daily.   Refills: 0     sulfamethoxazole-trimethoprim -160 MG Tabs per tablet  Commonly known as: Bactrim DS      Take 1 tablet by mouth 2 (two) times daily for 5 days.   Stop taking on: July 18, 2025  Quantity: 10 tablet  Refills: 0     tamsulosin 0.4 MG Caps  Commonly known as: Flomax      Take 2 capsules (0.8 mg total) by mouth at bedtime.   Refills: 0     vitamin B-12 50 MCG Tabs  Commonly known as: CYANOCOBALAMIN      Take 1 tablet (50 mcg total) by mouth in the morning.   Refills: 0     zolpidem 10 MG Tabs  Commonly known as: Ambien      Take 1 tablet (10 mg  total) by mouth nightly as needed.   Refills: 0               Where to Get Your Medications        These medications were sent to [address], [facility], [place] [address] [phone], [phone]  [address] [facility], [place] [address], [facility] [address]      Phone: [phone]   ciprofloxacin 500 MG Tabs  docusate sodium 100 MG Caps  HYDROcodone-acetaminophen 5-325 MG Tabs       Your appointments       Date & Time Appointment Department ([place])    Jul 21, 2025 8:00 AM CDT Nurse Visit with ECNICOLA UROLOGY NURSE [facility] ([facility] 4)        Jul 21, 2025 1:00 PM CDT Nurse Visit with ECNICOLA UROLOGY NURSE [facility] ([facility] 4)        Jul 24, 2025 11:00 AM CDT Post Op Visit with Catie Vasquez APRN [facility] ([place] [address])        Aug 22, 2025 9:15 AM CDT Pelvic Health Eval with Jazmín Raphael, PT [facility] ([facility])        Aug 29, 2025 2:45 PM CDT Pelvic Health Treatment with Jazmín Raphael, PT [facility] ([facility])        Sep 12, 2025 2:45 PM CDT Pelvic Health Treatment with Jazmín Raphael, PT [facility] ([facility])        Sep 19, 2025 2:45 PM CDT Pelvic Health Treatment with Jazmín Raphael, PT [facility] ([facility])        Sep 26, 2025 2:45 PM CDT Pelvic Health Treatment with Jazmín Raphael, PT [facility] ([facility])        Oct 03, 2025 2:45 PM CDT Pelvic Health Treatment with Jazmín Raphael, PT [facility] ([facility])        Oct 10, 2025 2:45 PM CDT Pelvic Health Treatment with Jazmín Raphael, PT [facility] ([facility]  [facility])        Oct 15, 2025 10:00 AM CDT Follow Up Visit with Sam Davis MD [facility] ([facility] 4)        Oct 17, 2025 2:45 PM CDT Pelvic Health Treatment with Jazmín Raphael, PT [facility] ([facility])        Oct 24, 2025 2:45 PM CDT Pelvic Health Treatment with Jazmín Raphael PT [facility] ([facility])        Oct 31, 2025 2:45 PM CDT Pelvic Health Treatment with Jazmín Raphael, PT [facility] ([facility])              [facility]  [facility]  [address]  [facility] [address]  [phone] [facility], [facility] 4  [address]  [facility] [address]  [facility] [id number] [phone]            SPRING Carrillo  [facility]  7/17/2025         [1]   Patient Active Problem List  Diagnosis    BPH loc w urin obs/LUTS    Abdominal pain, acute    Intractable vomiting    Acute prostatitis    Class 2 severe obesity due to excess calories with serious comorbidity and body mass index (BMI) of 36.0 to 36.9 in adult ([facility])    Type 2 diabetes mellitus with hyperglycemia, without long-term current use of insulin ([facility])    CAD (coronary artery disease), native coronary artery    Depression    Diabetes mellitus ([facility])    Essential hypertension    Gastroesophageal reflux disease without esophagitis    Hypersomnia with sleep apnea    Increased frequency of urination    Penile pain

## 2025-07-18 ENCOUNTER — TELEPHONE (OUTPATIENT)
Dept: SURGERY | Facility: CLINIC | Age: 70
End: 2025-07-18

## 2025-07-18 ENCOUNTER — HOSPITAL ENCOUNTER (EMERGENCY)
Facility: HOSPITAL | Age: 70
Discharge: HOME | End: 2025-07-18
Attending: EMERGENCY MEDICINE
Payer: COMMERCIAL

## 2025-07-18 ENCOUNTER — OFFICE VISIT (OUTPATIENT)
Dept: SURGERY | Facility: CLINIC | Age: 70
End: 2025-07-18

## 2025-07-18 VITALS
RESPIRATION RATE: 20 BRPM | SYSTOLIC BLOOD PRESSURE: 135 MMHG | OXYGEN SATURATION: 100 % | DIASTOLIC BLOOD PRESSURE: 91 MMHG | TEMPERATURE: 97 F | BODY MASS INDEX: 35.78 KG/M2 | HEART RATE: 63 BPM | WEIGHT: 270 LBS | HEIGHT: 73 IN

## 2025-07-18 DIAGNOSIS — N13.8 BPH WITH OBSTRUCTION/LOWER URINARY TRACT SYMPTOMS: ICD-10-CM

## 2025-07-18 DIAGNOSIS — N32.0 BLADDER NECK CONTRACTURE: ICD-10-CM

## 2025-07-18 DIAGNOSIS — T83.9XXA FOLEY CATHETER PROBLEM, INITIAL ENCOUNTER: Primary | ICD-10-CM

## 2025-07-18 DIAGNOSIS — R31.0 GROSS HEMATURIA: Primary | ICD-10-CM

## 2025-07-18 DIAGNOSIS — N40.1 BPH WITH OBSTRUCTION/LOWER URINARY TRACT SYMPTOMS: ICD-10-CM

## 2025-07-18 PROCEDURE — 99283 EMERGENCY DEPT VISIT LOW MDM: CPT

## 2025-07-18 PROCEDURE — 99213 OFFICE O/P EST LOW 20 MIN: CPT

## 2025-07-18 PROCEDURE — 99284 EMERGENCY DEPT VISIT MOD MDM: CPT

## 2025-07-18 NOTE — TELEPHONE ENCOUNTER
Verbally discussed below with Catie LONDONO  Okay to DB and/or will send medications for bladder spasms    Spoke with pt and informed of above. Pt requesting for appt  Appt scheduled   Future Appointments   Date Time Provider Department Center   7/18/2025  1:00 PM Catie Vasquez APRN [id number] [facility]   7/21/2025  8:00 AM ECNAP4 UROLOGY NURSE [id number] [facility]   7/21/2025  1:00 PM ECNAP4 UROLOGY NURSE [id number] [facility]   7/24/2025 11:00 AM Catie Vasquez APRN [id number] [facility]   8/22/2025  9:15 AM Ijeoma, Jazmín, PT [facility]   8/29/2025  2:45 PM Ijeoma, Jazmín, PT [facility]   9/12/2025  2:45 PM Ijeoma, Jazmín, PT [facility]   9/19/2025  2:45 PM Ijeoma, Jazmín, PT [facility]   9/26/2025  2:45 PM Ijeoma, Jazmín, PT [facility]   10/3/2025  2:45 PM Ijeoma, Jazmín, PT [facility]   10/10/2025  2:45 PM Ijeoma, Jazmín, PT [facility] PH[facility]   10/15/2025 10:00 AM Sam Davis MD [id number] [facility]   10/17/2025  2:45 PM Ijeoma, Jazmín, PT [facility] PH[facility]   10/24/2025  2:45 PM Ijeoma, Jazmín, PT [facility]   10/31/2025  2:45 PM Ijeoma, Jazmín, PT [facility]     This encounter is now closed

## 2025-07-18 NOTE — TELEPHONE ENCOUNTER
Received warm transfer call from call center  Pt had cystoscopy, transurethral resection of bladder neck contracture, partial transurethral resection of prostate on 07/16/25  Pt states he was seen in ER today as he had a bad dream last night and pulled on his hunter catheter  Hunter was irrigated in ER, small blood clots noted and hunter started draining   Pt is c/o feeling like bladder is full, he will then push and urine will be noted in tubing. Pt also feels like bladder is having spasms.  Pt is staying hydrated   Denies constipation  States there are no kinks to tubing   Pt requesting to be seen in office today     Catie Vasquez, APRN: Please review

## 2025-07-18 NOTE — TELEPHONE ENCOUNTER
Per patient having trouble with his catheter and has been in and out of the emergency room. Per patient asking if he can be seen today. Please call

## 2025-07-18 NOTE — ED PROVIDER NOTES
Patient Seen in: [facility] Emergency Department        History  Chief Complaint   Patient presents with    Cath Tube Problem     Stated Complaint: patient states that he may have pulled on hunter tube during bad dream, \"not wor*    Subjective:   HPI            This is a 69-year-old gentleman, 2 days postop from bladder neck stricture resection partial TURP, Hunter catheter in place here because last night while sleeping states he was having a vivid dream, believes he attempted to yank the catheter, reported some blood at 3 through meatus some small clots in the bag.  Denies any other complaints.      Objective:     No pertinent past medical history.            No pertinent past surgical history.              No pertinent social history.                              Physical Exam    ED Triage Vitals [07/18/25 0544]   /85   Pulse 66   Resp 20   Temp 97.4 °F (36.3 °C)   Temp src Temporal   SpO2 97 %   O2 Device None (Room air)       Current Vitals:   Vital Signs  BP: (!) 135/91  Pulse: 63  Resp: 20  Temp: 97.4 °F (36.3 °C)  Temp src: Temporal  MAP (mmHg): (!) 105    Oxygen Therapy  SpO2: 100 %  O2 Device: None (Room air)            Physical Exam      Physical Exam  Vitals signs and nursing note reviewed.   General:  Patient laying supine in the bed in no acute distress  Head: Normocephalic and atraumatic.   HEENT:  Mucous membranes are moist.   Abdominal: Soft nontender nondistended, normal bowel sounds, no guarding no rebound tenderness  : Dried blood at the meatus around the catheter, pink-tinged urine in the bag.  Skin: Warm and dry  Neurological: Awake alert, speech is normal            ED Course  Labs Reviewed - No data to display                     MDM     69-year-old gentleman here for evaluation of Hunter catheter trauma Hunter in place for 2 days since partial TURP, bladder neck stricture resection.  Differential includes urethral trauma from pulling Hunter catheter, urinary retention.  Bladder  scan showed approximately 100 cc in the bladder, catheter was irrigated by nursing staff, few small clots out, patient was monitored in the emergency department for approximately 1.5 hours, ultimately draining 800 cc of pink-tinged urine feeling better.  I spoke with [place] urology NP, she agrees with plan for close follow-up in clinic for voiding trial scheduled return precautions discussed with patient he is in agreement with plan.        Medical Decision Making      Disposition and Plan     Clinical Impression:  1. Betancourt catheter problem, initial encounter         Disposition:  Discharge  7/18/2025  7:17 am    Follow-up:  Sam Davis MD  [address]  [facility] [id number] [phone]    Follow up  Follow-up with urology for your voiding trial as scheduled return to the ER immediately if symptoms change or worsen or any other new concerns          Medications Prescribed:  Current Discharge Medication List                Supplementary Documentation:

## 2025-07-18 NOTE — ED INITIAL ASSESSMENT (HPI)
Pt states he woke up with pain on hunter cath site, \"think I have a bad dream and pulled it out a little bit, stopped draining since.\" Hunter bag seen with a small amount of hematuria, states it's been \"bloody\" since yesterday

## 2025-07-19 LAB — BACTERIA BLD CULT: POSITIVE

## 2025-07-21 ENCOUNTER — NURSE ONLY (OUTPATIENT)
Dept: SURGERY | Facility: CLINIC | Age: 70
End: 2025-07-21

## 2025-07-21 ENCOUNTER — APPOINTMENT (OUTPATIENT)
Dept: SURGERY | Facility: CLINIC | Age: 70
End: 2025-07-21

## 2025-07-21 DIAGNOSIS — N32.0 BLADDER NECK CONTRACTURE: Primary | ICD-10-CM

## 2025-07-21 DIAGNOSIS — R33.9 URINARY RETENTION: Primary | ICD-10-CM

## 2025-07-21 NOTE — PROGRESS NOTES
Patient arrives for Nurse visit with voiding trial.     Verified the patient's name, date of birth and his reason for the visit. Steps of the procedure explained. Safely assisted patient to the exam table and put him in a comfortable position. While on standing position, RN asked to lower his pants and undergarments. Blue chux under his buttocks. Draped the private area.  Penile area observed to be clean and pink. No redness or swelling.     Donned gloves. Removed the straps and the leg bag. Urine was yellow and no sedimentation observed.    Chux on patient's thigh. With the empty syringe, deflated the balloon of his entire content of 25cc. Betancourt  22 Fr.removed slowly encouraging the patient to breath slowly. Leg bag was also removed, disposed it with the chux and syringe. Bleeding mild leading  of blood noted following removal of catheter.  Patient up to bath post removal and reports having some discomfort and sweating.   Patient given water to hydrate.  Tolerating well.    Assisted patient off commode and helped him pull his pants and undergarments. Washed hand and assisted Patient to exam room and reclined until Patient stated that \"I feel better\"    Provided education and instructions to monitor urination and hydrate.  ER precautions given if symptoms worsen.  Patient to return this afternoon for PVR.

## 2025-07-31 ENCOUNTER — TELEPHONE (OUTPATIENT)
Dept: SURGERY | Facility: CLINIC | Age: 70
End: 2025-07-31

## 2025-08-22 ENCOUNTER — APPOINTMENT (OUTPATIENT)
Dept: PHYSICAL THERAPY | Facility: HOSPITAL | Age: 70
End: 2025-08-22
Attending: PHYSICIAN ASSISTANT

## 2025-08-29 ENCOUNTER — APPOINTMENT (OUTPATIENT)
Dept: PHYSICAL THERAPY | Facility: HOSPITAL | Age: 70
End: 2025-08-29
Attending: PHYSICIAN ASSISTANT

## (undated) DEVICE — HF-RESECTION ELECTRODE PLASMALOOP LOOP, LARGE, 24 FR., 12°/16°, ESG TURIS: Brand: OLYMPUS

## (undated) DEVICE — SLEEVE COMPR MD KNEE LEN SGL USE KENDALL SCD

## (undated) DEVICE — TUR/ENDOSCOPIC CABLE, 10' (3.05 M): Brand: CONMED

## (undated) DEVICE — GLOVE SUR 7 SENSICARE PI PIP CRM PWD F

## (undated) DEVICE — DEVICE STATLOCK 3-WAY DISP

## (undated) DEVICE — SOLUTION IV 1000ML 0.9% NACL PRESERVATIVE

## (undated) DEVICE — NEEDLE SPNL 18GA L3.5IN PNK QNCKE STYL DISP

## (undated) DEVICE — Device

## (undated) DEVICE — SYRINGE,TOOMEY,IRRIGATION,70CC,STERILE: Brand: MEDLINE

## (undated) DEVICE — BAG,DRAINAGE,4L,A/R TOWER,METAL CLAMP: Brand: MEDLINE

## (undated) DEVICE — HF-RESECTION ELECTRODE PLASMA-OVALBUTTON BUTTON, OVAL, 24 FR., 12°-30°, ESG TURIS: Brand: OLYMPUS

## (undated) DEVICE — SOLUTION IRRIG 3000ML 0.9% NACL FLX CONT

## (undated) DEVICE — GLOVE SUR 7.5 SENSICARE PI PIP CRM PWD F

## (undated) DEVICE — GAUZE,SPONGE,USP,4"X4",12PLY,STRL,10/PK: Brand: MEDLINE INDUSTRIES, INC.

## (undated) DEVICE — PACK PBDS CYSTOSCOPY

## (undated) DEVICE — SYRINGE MED 20ML STD CLR PLAS LL TIP N CTRL

## (undated) DEVICE — SYRINGE MED 30ML STD CLR PLAS LL TIP N CTRL

## (undated) DEVICE — ADHESIVE LIQ 2/3ML VI MASTISOL

## (undated) DEVICE — SOLUTION IRRIG 1000ML ST H2O AQUALITE PLAS

## (undated) DEVICE — CATHETER URET 5FR L70CM FLX OPN TIP NONPORTED

## (undated) DEVICE — SYRINGE MED 10ML LL TIP W/O SFTY DISP

## (undated) DEVICE — SYRINGE MED IRRIG 20ML POLYPR BRL STD LL TIP

## (undated) DEVICE — SPONGE 4X4 10PK

## (undated) DEVICE — DEVICE STBL AD TRICOT POLY CLS CELL FOAM

## (undated) NOTE — LETTER
[facility], [address]  [facility] [address]  PH: [phone]  FAX: [phone]      To whom this may concern:    Due to recent exacerbation of symptoms timed with increased travel it is recommended to minimize travel for long distances, including > 30 minute drives until re-evaluated at later date.     Thank you,        Brenda Bains PA-C    3/27/2025   COLTEN Carter 10/1/1955

## (undated) NOTE — LETTER
[facility], [address]  [facility] [address]  PH: [phone]  FAX: [phone]      To Whom it may concern,      Mr. De Leon is currently under my care, I had previously suggested that his extensive hours of driving are attributing to his worsening condition and that he limit his driving time. I understand that he has concern for job security and has decided to continue his current schedule.     Due to his recent emergency room visit and the progression of his condition, I am strongly encouraging him to follow the recommendation and limit his driving time.  Thank you,        Klaudia LIMAP-C

## (undated) NOTE — LETTER
[address]  [address]  Authorization for Surgical Operation and Procedure     Date:___________                                                                                                         Time:__________  I hereby authorize Surgeon(s):  Марина Patino MD, my physician and his/her assistants (if applicable), which may include medical students, residents, and/or fellows, to perform the following surgical operation/ procedure and administer such anesthesia as may be determined necessary by my physician:  Operation/Procedure name (s) Procedure(s):  CYSTOSCOPY URETEROSCOPY on [facility]   2.   I recognize that during the surgical operation/procedure, unforeseen conditions may necessitate additional or different procedures than those listed above.  I, therefore, further authorize and request that the above-named surgeon, assistants, or designees perform such procedures as are, in their judgment, necessary and desirable.    3.   My surgeon/physician has discussed prior to my surgery the potential benefits, risks and side effects of this procedure; the likelihood of achieving goals; and potential problems that might occur during recuperation.  They also discussed reasonable alternatives to the procedure, including risks, benefits, and side effects related to the alternatives and risks related to not receiving this procedure.  I have had all my questions answered and I acknowledge that no guarantee has been made as to the result that may be obtained.    4.   Should the need arise during my operation/procedure, which includes change of level of care prior to discharge, I also consent to the administration of blood and/or blood products.  Further, I understand that despite careful testing and screening of blood or blood products by collecting agencies, I may still be subject to ill effects as a result of receiving a blood transfusion and/or blood products.  The  following are some, but not all, of the potential risks that can occur: fever and allergic reactions, hemolytic reactions, transmission of diseases such as Hepatitis, AIDS and Cytomegalovirus (CMV) and fluid overload.  In the event that I wish to have an autologous transfusion of my own blood, or a directed donor transfusion, I will discuss this with my physician.  Check only if Refusing Blood or Blood Products  I understand refusal of blood or blood products as deemed necessary by my physician may have serious consequences to my condition to include possible death. I hereby assume responsibility for my refusal and release the hospital, its personnel, and my physicians from any responsibility for the consequences of my refusal.          o  Refuse      5.   I authorize the use of any specimen, organs, tissues, body parts or foreign objects that may be removed from my body during the operation/procedure for diagnosis, research or teaching purposes and their subsequent disposal by hospital authorities.  I also authorize the release of specimen test results and/or written reports to my treating physician on the hospital medical staff or other referring or consulting physicians involved in my care, at the discretion of the Pathologist or my treating physician.    6.   I consent to the photographing or videotaping of the operations or procedures to be performed, including appropriate portions of my body for medical, scientific, or educational purposes, provided my identity is not revealed by the pictures or by descriptive texts accompanying them.  If the procedure has been photographed/videotaped, the surgeon will obtain the original picture, image, videotape or CD.  The hospital will not be responsible for storage, release or maintenance of the picture, image, tape or CD.    7.   I consent to the presence of a  or observers in the operating room as deemed necessary by my physician or their designees.     8.   I recognize that in the event my procedure results in extended X-Ray/fluoroscopy time, I may develop a skin reaction.    9. If I have a Do Not Attempt Resuscitation (DNAR) order in place, that status will be suspended while in the operating room, procedural suite, and during the recovery period unless otherwise explicitly stated by me (or a person authorized to consent on my behalf). The surgeon or my attending physician will determine when the applicable recovery period ends for purposes of reinstating the DNAR order.  10. Patients having a sterilization procedure: I understand that if the procedure is successful the results will be permanent and it will therefore be impossible for me to inseminate, conceive, or bear children.  I also understand that the procedure is intended to result in sterility, although the result has not been guaranteed.   11. I acknowledge that my physician has explained sedation/analgesia administration to me including the risk and benefits I consent to the administration of sedation/analgesia as may be necessary or desirable in the judgment of my physician.    I CERTIFY THAT I HAVE READ AND FULLY UNDERSTAND THE ABOVE CONSENT TO OPERATION and/or OTHER PROCEDURE.    _________________________________________  __________________________________  Signature of Patient     Signature of Responsible Person         ___________________________________         Printed Name of Responsible Person           _________________________________                 Relationship to Patient  _________________________________________  ______________________________  Signature of Witness          Date  Time      Patient Name: Delfin De Leon     : 10/1/1955                 Printed: 2025     Medical Record #: [id number]                     Page 1 of [address] [place] [address] [place]  [address]    Consent for Anesthesia    I, Delfin De Leon  agree to be cared for by an anesthesiologist, who is specially trained to monitor me and give me medicine to put me to sleep or keep me comfortable during my procedure    I understand that my anesthesiologist is not an employee or agent of [facility] or [employer]. He or she works for [employer].    As the patient asking for anesthesia services, I agree to:  Allow the anesthesiologist (anesthesia doctor) to give me medicine and do additional procedures as necessary. Some examples are: Starting or using an “IV” to give me medicine, fluids or blood during my procedure, and having a breathing tube placed to help me breathe when I’m asleep (intubation). In the event that my heart stops working properly, I understand that my anesthesiologist will make every effort to sustain my life, unless otherwise directed by [facility] Do Not Resuscitate documents.  Tell my anesthesia doctor before my procedure:  If I am pregnant.  The last time that I ate or drank.  All of the medicines I take (including prescriptions, herbal supplements, and pills I can buy without a prescription (including street drugs/illegal medications). Failure to inform my anesthesiologist about these medicines may increase my risk of anesthetic complications.  If I am allergic to anything or have had a reaction to anesthesia before.  I understand how the anesthesia medicine will help me (benefits).  I understand that with any type of anesthesia medicine there are risks:  The most common risks are: nausea, vomiting, sore throat, muscle soreness, damage to my eyes, mouth, or teeth (from breathing tube placement).  Rare risks include: remembering what happened during my procedure, allergic reactions to medications, injury to my airway, heart, lungs, vision, nerves, or muscles and in extremely rare instances death.  My doctor has explained to me other choices available to me for my care (alternatives).  Pregnant Patients  (“epidural”):  I understand that the risks of having an epidural (medicine given into my back to help control pain during labor), include itching, low blood pressure, difficulty urinating, headache or slowing of the baby’s heart. Very rare risks include infection, bleeding, seizure, irregular heart rhythms and nerve injury.  Regional Anesthesia (“spinal”, “epidural”, & “nerve blocks”):  I understand that rare but potential complications include headache, bleeding, infection, seizure, irregular heart rhythms, and nerve injury.    I can change my mind about having anesthesia services at any time before I get the medicine.    _____________________________________________________________________________  Patient (or Representative) Signature/Relationship to Patient  Date   Time    _____________________________________________________________________________   Name (if used)    Language/Organization   Time    _____________________________________________________________________________  Anesthesiologist Signature     Date   Time  I have discussed the procedure and information above with the patient (or patient’s representative) and answered their questions. The patient or their representative has agreed to have anesthesia services.    _____________________________________________________________________________  Witness        Date   Time  I have verified that the signature is that of the patient or patient’s representative, and that it was signed before the procedure  Patient Name: Delfin De Leon     : 10/1/1955                 Printed: 2025     Medical Record #: [id number]                     Page 2 of 2

## (undated) NOTE — LETTER
[address]  [address]  Authorization for Surgical Operation and Procedure     Date:___________                                                                                                         Time:__________  I hereby authorize Марина Erwin, my physician and his/her assistants (if applicable), which may include medical students, residents, and/or fellows, to perform the following surgical operation/ procedure and administer such anesthesia as may be determined necessary by my physician:  Operation/Procedure name (s) cystoscopy and hunter catheter placement on Delfin De Leon  2.   I recognize that during the surgical operation/procedure, unforeseen conditions may necessitate additional or different procedures than those listed above.  I, therefore, further authorize and request that the above-named surgeon, assistants, or designees perform such procedures as are, in their judgment, necessary and desirable.    3.   My surgeon/physician has discussed prior to my surgery the potential benefits, risks and side effects of this procedure; the likelihood of achieving goals; and potential problems that might occur during recuperation.  They also discussed reasonable alternatives to the procedure, including risks, benefits, and side effects related to the alternatives and risks related to not receiving this procedure.  I have had all my questions answered and I acknowledge that no guarantee has been made as to the result that may be obtained.    4.   Should the need arise during my operation/procedure, which includes change of level of care prior to discharge, I also consent to the administration of blood and/or blood products.  Further, I understand that despite careful testing and screening of blood or blood products by collecting agencies, I may still be subject to ill effects as a result of receiving a blood transfusion and/or blood products.  The following are some,  but not all, of the potential risks that can occur: fever and allergic reactions, hemolytic reactions, transmission of diseases such as Hepatitis, AIDS and Cytomegalovirus (CMV) and fluid overload.  In the event that I wish to have an autologous transfusion of my own blood, or a directed donor transfusion, I will discuss this with my physician.  Check only if Refusing Blood or Blood Products  I understand refusal of blood or blood products as deemed necessary by my physician may have serious consequences to my condition to include possible death. I hereby assume responsibility for my refusal and release the hospital, its personnel, and my physicians from any responsibility for the consequences of my refusal.          o  Refuse      5.   I authorize the use of any specimen, organs, tissues, body parts or foreign objects that may be removed from my body during the operation/procedure for diagnosis, research or teaching purposes and their subsequent disposal by hospital authorities.  I also authorize the release of specimen test results and/or written reports to my treating physician on the hospital medical staff or other referring or consulting physicians involved in my care, at the discretion of the Pathologist or my treating physician.    6.   I consent to the photographing or videotaping of the operations or procedures to be performed, including appropriate portions of my body for medical, scientific, or educational purposes, provided my identity is not revealed by the pictures or by descriptive texts accompanying them.  If the procedure has been photographed/videotaped, the surgeon will obtain the original picture, image, videotape or CD.  The hospital will not be responsible for storage, release or maintenance of the picture, image, tape or CD.    7.   I consent to the presence of a  or observers in the operating room as deemed necessary by my physician or their designees.    8.   I recognize  that in the event my procedure results in extended X-Ray/fluoroscopy time, I may develop a skin reaction.    9. If I have a Do Not Attempt Resuscitation (DNAR) order in place, that status will be suspended while in the operating room, procedural suite, and during the recovery period unless otherwise explicitly stated by me (or a person authorized to consent on my behalf). The surgeon or my attending physician will determine when the applicable recovery period ends for purposes of reinstating the DNAR order.  10. Patients having a sterilization procedure: I understand that if the procedure is successful the results will be permanent and it will therefore be impossible for me to inseminate, conceive, or bear children.  I also understand that the procedure is intended to result in sterility, although the result has not been guaranteed.   11. I acknowledge that my physician has explained sedation/analgesia administration to me including the risk and benefits I consent to the administration of sedation/analgesia as may be necessary or desirable in the judgment of my physician.    I CERTIFY THAT I HAVE READ AND FULLY UNDERSTAND THE ABOVE CONSENT TO OPERATION and/or OTHER PROCEDURE.    _________________________________________  __________________________________  Signature of Patient     Signature of Responsible Person         ___________________________________         Printed Name of Responsible Person           _________________________________                 Relationship to Patient  _________________________________________  ______________________________  Signature of Witness          Date  Time      Patient Name: Delfin De Leon     : 10/1/1955                 Printed: 2025     Medical Record #: [id number]                     Page 1 of [address] [place] [address], [place]  [address]    Consent for Anesthesia    I, Delfin De Leon agree to be cared for  by an anesthesiologist, who is specially trained to monitor me and give me medicine to put me to sleep or keep me comfortable during my procedure    I understand that my anesthesiologist is not an employee or agent of [facility] or [employer]. He or she works for [employer].    As the patient asking for anesthesia services, I agree to:  Allow the anesthesiologist (anesthesia doctor) to give me medicine and do additional procedures as necessary. Some examples are: Starting or using an “IV” to give me medicine, fluids or blood during my procedure, and having a breathing tube placed to help me breathe when I’m asleep (intubation). In the event that my heart stops working properly, I understand that my anesthesiologist will make every effort to sustain my life, unless otherwise directed by [facility] Do Not Resuscitate documents.  Tell my anesthesia doctor before my procedure:  If I am pregnant.  The last time that I ate or drank.  All of the medicines I take (including prescriptions, herbal supplements, and pills I can buy without a prescription (including street drugs/illegal medications). Failure to inform my anesthesiologist about these medicines may increase my risk of anesthetic complications.  If I am allergic to anything or have had a reaction to anesthesia before.  I understand how the anesthesia medicine will help me (benefits).  I understand that with any type of anesthesia medicine there are risks:  The most common risks are: nausea, vomiting, sore throat, muscle soreness, damage to my eyes, mouth, or teeth (from breathing tube placement).  Rare risks include: remembering what happened during my procedure, allergic reactions to medications, injury to my airway, heart, lungs, vision, nerves, or muscles and in extremely rare instances death.  My doctor has explained to me other choices available to me for my care (alternatives).  Pregnant Patients (“epidural”):  I  understand that the risks of having an epidural (medicine given into my back to help control pain during labor), include itching, low blood pressure, difficulty urinating, headache or slowing of the baby’s heart. Very rare risks include infection, bleeding, seizure, irregular heart rhythms and nerve injury.  Regional Anesthesia (“spinal”, “epidural”, & “nerve blocks”):  I understand that rare but potential complications include headache, bleeding, infection, seizure, irregular heart rhythms, and nerve injury.    I can change my mind about having anesthesia services at any time before I get the medicine.    _____________________________________________________________________________  Patient (or Representative) Signature/Relationship to Patient  Date   Time    _____________________________________________________________________________   Name (if used)    Language/Organization   Time    _____________________________________________________________________________  Anesthesiologist Signature     Date   Time  I have discussed the procedure and information above with the patient (or patient’s representative) and answered their questions. The patient or their representative has agreed to have anesthesia services.    _____________________________________________________________________________  Witness        Date   Time  I have verified that the signature is that of the patient or patient’s representative, and that it was signed before the procedure  Patient Name: Delfin De Leon     : 10/1/1955                 Printed: 2025     Medical Record #: [id number]                     Page 2 of 2

## (undated) NOTE — Clinical Note
Currently taking tamsulosin 0.4mg once daily - States was taking 2 tabs daily (=0.8mg) but when discharged from hospital was told take 0.4mg daily. Should take tamsulosin 0.4mg daily or 0.8mg daily? Message forwarded to Missy Quinones PA-C to address.

## (undated) NOTE — LETTER
[address]  [address]  Authorization for Surgical Operation and Procedure     Date:___________                                                                                                         Time:__________  I hereby authorize Surgeon(s):  Марина Patino MD, my physician and his/her assistants (if applicable), which may include medical students, residents, and/or fellows, to perform the following surgical operation/ procedure and administer such anesthesia as may be determined necessary by my physician:  Operation/Procedure name (s) Procedure(s):  CYSTOSCOPY URETEROSCOPY on [facility]   2.   I recognize that during the surgical operation/procedure, unforeseen conditions may necessitate additional or different procedures than those listed above.  I, therefore, further authorize and request that the above-named surgeon, assistants, or designees perform such procedures as are, in their judgment, necessary and desirable.    3.   My surgeon/physician has discussed prior to my surgery the potential benefits, risks and side effects of this procedure; the likelihood of achieving goals; and potential problems that might occur during recuperation.  They also discussed reasonable alternatives to the procedure, including risks, benefits, and side effects related to the alternatives and risks related to not receiving this procedure.  I have had all my questions answered and I acknowledge that no guarantee has been made as to the result that may be obtained.    4.   Should the need arise during my operation/procedure, which includes change of level of care prior to discharge, I also consent to the administration of blood and/or blood products.  Further, I understand that despite careful testing and screening of blood or blood products by collecting agencies, I may still be subject to ill effects as a result of receiving a blood transfusion and/or blood products.  The  following are some, but not all, of the potential risks that can occur: fever and allergic reactions, hemolytic reactions, transmission of diseases such as Hepatitis, AIDS and Cytomegalovirus (CMV) and fluid overload.  In the event that I wish to have an autologous transfusion of my own blood, or a directed donor transfusion, I will discuss this with my physician.  Check only if Refusing Blood or Blood Products  I understand refusal of blood or blood products as deemed necessary by my physician may have serious consequences to my condition to include possible death. I hereby assume responsibility for my refusal and release the hospital, its personnel, and my physicians from any responsibility for the consequences of my refusal.          o  Refuse      5.   I authorize the use of any specimen, organs, tissues, body parts or foreign objects that may be removed from my body during the operation/procedure for diagnosis, research or teaching purposes and their subsequent disposal by hospital authorities.  I also authorize the release of specimen test results and/or written reports to my treating physician on the hospital medical staff or other referring or consulting physicians involved in my care, at the discretion of the Pathologist or my treating physician.    6.   I consent to the photographing or videotaping of the operations or procedures to be performed, including appropriate portions of my body for medical, scientific, or educational purposes, provided my identity is not revealed by the pictures or by descriptive texts accompanying them.  If the procedure has been photographed/videotaped, the surgeon will obtain the original picture, image, videotape or CD.  The hospital will not be responsible for storage, release or maintenance of the picture, image, tape or CD.    7.   I consent to the presence of a  or observers in the operating room as deemed necessary by my physician or their designees.     8.   I recognize that in the event my procedure results in extended X-Ray/fluoroscopy time, I may develop a skin reaction.    9. If I have a Do Not Attempt Resuscitation (DNAR) order in place, that status will be suspended while in the operating room, procedural suite, and during the recovery period unless otherwise explicitly stated by me (or a person authorized to consent on my behalf). The surgeon or my attending physician will determine when the applicable recovery period ends for purposes of reinstating the DNAR order.  10. Patients having a sterilization procedure: I understand that if the procedure is successful the results will be permanent and it will therefore be impossible for me to inseminate, conceive, or bear children.  I also understand that the procedure is intended to result in sterility, although the result has not been guaranteed.   11. I acknowledge that my physician has explained sedation/analgesia administration to me including the risk and benefits I consent to the administration of sedation/analgesia as may be necessary or desirable in the judgment of my physician.    I CERTIFY THAT I HAVE READ AND FULLY UNDERSTAND THE ABOVE CONSENT TO OPERATION and/or OTHER PROCEDURE.    _________________________________________  __________________________________  Signature of Patient     Signature of Responsible Person         ___________________________________         Printed Name of Responsible Person           _________________________________                 Relationship to Patient  _________________________________________  ______________________________  Signature of Witness          Date  Time      Patient Name: Delfin De Leon     : 10/1/1955                 Printed: 2025     Medical Record #: [id number]                     Page 1 of [address] [place] [address] [place]  [address]    Consent for Anesthesia    I, Delfin De Leon  agree to be cared for by an anesthesiologist, who is specially trained to monitor me and give me medicine to put me to sleep or keep me comfortable during my procedure    I understand that my anesthesiologist is not an employee or agent of [facility] or [employer]. He or she works for [employer].    As the patient asking for anesthesia services, I agree to:  Allow the anesthesiologist (anesthesia doctor) to give me medicine and do additional procedures as necessary. Some examples are: Starting or using an “IV” to give me medicine, fluids or blood during my procedure, and having a breathing tube placed to help me breathe when I’m asleep (intubation). In the event that my heart stops working properly, I understand that my anesthesiologist will make every effort to sustain my life, unless otherwise directed by [facility] Do Not Resuscitate documents.  Tell my anesthesia doctor before my procedure:  If I am pregnant.  The last time that I ate or drank.  All of the medicines I take (including prescriptions, herbal supplements, and pills I can buy without a prescription (including street drugs/illegal medications). Failure to inform my anesthesiologist about these medicines may increase my risk of anesthetic complications.  If I am allergic to anything or have had a reaction to anesthesia before.  I understand how the anesthesia medicine will help me (benefits).  I understand that with any type of anesthesia medicine there are risks:  The most common risks are: nausea, vomiting, sore throat, muscle soreness, damage to my eyes, mouth, or teeth (from breathing tube placement).  Rare risks include: remembering what happened during my procedure, allergic reactions to medications, injury to my airway, heart, lungs, vision, nerves, or muscles and in extremely rare instances death.  My doctor has explained to me other choices available to me for my care (alternatives).  Pregnant Patients  (“epidural”):  I understand that the risks of having an epidural (medicine given into my back to help control pain during labor), include itching, low blood pressure, difficulty urinating, headache or slowing of the baby’s heart. Very rare risks include infection, bleeding, seizure, irregular heart rhythms and nerve injury.  Regional Anesthesia (“spinal”, “epidural”, & “nerve blocks”):  I understand that rare but potential complications include headache, bleeding, infection, seizure, irregular heart rhythms, and nerve injury.    I can change my mind about having anesthesia services at any time before I get the medicine.    _____________________________________________________________________________  Patient (or Representative) Signature/Relationship to Patient  Date   Time    _____________________________________________________________________________   Name (if used)    Language/Organization   Time    _____________________________________________________________________________  Anesthesiologist Signature     Date   Time  I have discussed the procedure and information above with the patient (or patient’s representative) and answered their questions. The patient or their representative has agreed to have anesthesia services.    _____________________________________________________________________________  Witness        Date   Time  I have verified that the signature is that of the patient or patient’s representative, and that it was signed before the procedure  Patient Name: Delfin De Leon     : 10/1/1955                 Printed: 2025     Medical Record #: [id number]                     Page 2 of 2

## (undated) NOTE — LETTER
[address]  [address]  Authorization for Surgical Operation and Procedure    Date:___________                                                                                                         Time:__________  1.  I hereby authorizeSurgeon(s):  Марина Patino MD, my physician and his/her assistants (if applicable), which may include medical students, residents, and/or fellows, to perform the following surgical operation/ procedure and administer such anesthesia as may be determined necessary by my physician:  Operation/Procedure name (s) Procedure(s):  CYSTOSCOPY , DIEGO CATHETER PLACEMENT on Delfin De Leon   2.   I recognize that during the surgical operation/procedure, unforeseen conditions may necessitate additional or different procedures than those listed above.  I, therefore, further authorize and request that the above-named surgeon, assistants, or designees perform such procedures as are, in their judgment, necessary and desirable.    3.   My surgeon/physician has discussed prior to my surgery the potential benefits, risks and side effects of this procedure; the likelihood of achieving goals; and potential problems that might occur during recuperation.  They also discussed reasonable alternatives to the procedure, including risks, benefits, and side effects related to the alternatives and risks related to not receiving this procedure.  I have had all my questions answered and I acknowledge that no guarantee has been made as to the result that may be obtained.    4.   Should the need arise during my operation/procedure, which includes change of level of care prior to discharge, I also consent to the administration of blood and/or blood products.  Further, I understand that despite careful testing and screening of blood or blood products by collecting agencies, I may still be subject to ill effects as a result of receiving a blood transfusion and/or blood  products.  The following are some, but not all, of the potential risks that can occur: fever and allergic reactions, hemolytic reactions, transmission of diseases such as Hepatitis, AIDS and Cytomegalovirus (CMV) and fluid overload.  In the event that I wish to have an autologous transfusion of my own blood, or a directed donor transfusion, I will discuss this with my physician.  Check only if Refusing Blood or Blood Products  I understand refusal of blood or blood products as deemed necessary by my physician may have serious consequences to my condition to include possible death. I hereby assume responsibility for my refusal and release the hospital, its personnel, and my physicians from any responsibility for the consequences of my refusal.         o  Refuse    5.   I authorize the use of any specimen, organs, tissues, body parts or foreign objects that may be removed from my body during the operation/procedure for diagnosis, research or teaching purposes and their subsequent disposal by hospital authorities.  I also authorize the release of specimen test results and/or written reports to my treating physician on the hospital medical staff or other referring or consulting physicians involved in my care, at the discretion of the Pathologist or my treating physician.    6.   I consent to the photographing or videotaping of the operations or procedures to be performed, including appropriate portions of my body for medical, scientific, or educational purposes, provided my identity is not revealed by the pictures or by descriptive texts accompanying them.  If the procedure has been photographed/videotaped, the surgeon will obtain the original picture, image, videotape or CD.  The hospital will not be responsible for storage, release or maintenance of the picture, image, tape or CD.    7.   I consent to the presence of a  or observers in the operating room as deemed necessary by my physician or their  designees.    8.   I recognize that in the event my procedure results in extended X-Ray/fluoroscopy time, I may develop a skin reaction.    9. If I have a Do Not Attempt Resuscitation (DNAR) order in place, that status will be suspended while in the operating room, procedural suite, and during the recovery period unless otherwise explicitly stated by me (or a person authorized to consent on my behalf). The surgeon or my attending physician will determine when the applicable recovery period ends for purposes of reinstating the DNAR order.  10. Patients having a sterilization procedure: I understand that if the procedure is successful the results will be permanent and it will therefore be impossible for me to inseminate, conceive, or bear children.  I also understand that the procedure is intended to result in sterility, although the result has not been guaranteed.   11. I acknowledge that my physician has explained sedation/analgesia administration to me including the risk and benefits I consent to the administration of sedation/analgesia as may be necessary or desirable in the judgment of my physician.    I CERTIFY THAT I HAVE READ AND FULLY UNDERSTAND THE ABOVE CONSENT TO OPERATION and/or OTHER PROCEDURE.     _________________________________________  __________________________________  Signature of Patient     Signature of Responsible Person         ___________________________________         Printed Name of Responsible Person             _________________________________                 Relationship to Patient  _________________________________________  ______________________________  Signature of Witness          Date  Time      Patient Name: Delfin De Leon     : 10/1/1955                 Printed: 2025     Medical Record #: [id number]                                            Page 2 of [address] [place] [address]  [address]    Consent for Anesthesia    I, Delfin Middleton  Moises agree to be cared for by an anesthesiologist, who is specially trained to monitor me and give me medicine to put me to sleep or keep me comfortable during my procedure    I understand that my anesthesiologist is not an employee or agent of [facility] or [employer]. He or she works for [employer].    As the patient asking for anesthesia services, I agree to:  Allow the anesthesiologist (anesthesia doctor) to give me medicine and do additional procedures as necessary. Some examples are: Starting or using an “IV” to give me medicine, fluids or blood during my procedure, and having a breathing tube placed to help me breathe when I’m asleep (intubation). In the event that my heart stops working properly, I understand that my anesthesiologist will make every effort to sustain my life, unless otherwise directed by [facility] Do Not Resuscitate documents.  Tell my anesthesia doctor before my procedure:  If I am pregnant.  The last time that I ate or drank.  All of the medicines I take (including prescriptions, herbal supplements, and pills I can buy without a prescription (including street drugs/illegal medications). Failure to inform my anesthesiologist about these medicines may increase my risk of anesthetic complications.  If I am allergic to anything or have had a reaction to anesthesia before.  I understand how the anesthesia medicine will help me (benefits).  I understand that with any type of anesthesia medicine there are risks:  The most common risks are: nausea, vomiting, sore throat, muscle soreness, damage to my eyes, mouth, or teeth (from breathing tube placement).  Rare risks include: remembering what happened during my procedure, allergic reactions to medications, injury to my airway, heart, lungs, vision, nerves, or muscles and in extremely rare instances death.  My doctor has explained to me other choices available to me for my care (alternatives).  Pregnant  Patients (“epidural”):  I understand that the risks of having an epidural (medicine given into my back to help control pain during labor), include itching, low blood pressure, difficulty urinating, headache or slowing of the baby’s heart. Very rare risks include infection, bleeding, seizure, irregular heart rhythms and nerve injury.  Regional Anesthesia (“spinal”, “epidural”, & “nerve blocks”):  I understand that rare but potential complications include headache, bleeding, infection, seizure, irregular heart rhythms, and nerve injury.    I can change my mind about having anesthesia services at any time before I get the medicine.    _____________________________________________________________________________  Patient (or Representative) Signature/Relationship to Patient  Date   Time    _____________________________________________________________________________   Name (if used)    Language/Organization   Time    _____________________________________________________________________________  Anesthesiologist Signature     Date   Time  I have discussed the procedure and information above with the patient (or patient’s representative) and answered their questions. The patient or their representative has agreed to have anesthesia services.    _____________________________________________________________________________  Witness        Date   Time  I have verified that the signature is that of the patient or patient’s representative, and that it was signed before the procedure  Patient Name: Delfin De Leon     : 10/1/1955                 Printed: 2025     Medical Record #: [id number]                     Page 3 of 3

## (undated) NOTE — Clinical Note
[address]  [address]  Consent for Procedure/Sedation  Date: ***         Time: ***    {[facility] ivs consent:[id number]}